# Patient Record
Sex: MALE | Race: OTHER | HISPANIC OR LATINO | Employment: UNEMPLOYED | URBAN - NONMETROPOLITAN AREA
[De-identification: names, ages, dates, MRNs, and addresses within clinical notes are randomized per-mention and may not be internally consistent; named-entity substitution may affect disease eponyms.]

---

## 2023-10-11 ENCOUNTER — HOSPITAL ENCOUNTER (EMERGENCY)
Facility: HOSPITAL | Age: 38
Discharge: HOME | End: 2023-10-11
Attending: EMERGENCY MEDICINE
Payer: MEDICARE

## 2023-10-11 VITALS
HEART RATE: 75 BPM | HEIGHT: 72 IN | WEIGHT: 185 LBS | RESPIRATION RATE: 16 BRPM | DIASTOLIC BLOOD PRESSURE: 83 MMHG | SYSTOLIC BLOOD PRESSURE: 124 MMHG | TEMPERATURE: 97 F | BODY MASS INDEX: 25.06 KG/M2 | OXYGEN SATURATION: 97 %

## 2023-10-11 DIAGNOSIS — K04.7 DENTAL INFECTION: Primary | ICD-10-CM

## 2023-10-11 PROCEDURE — 99283 EMERGENCY DEPT VISIT LOW MDM: CPT | Performed by: EMERGENCY MEDICINE

## 2023-10-11 RX ORDER — IBUPROFEN 600 MG/1
600 TABLET ORAL EVERY 8 HOURS PRN
Qty: 30 TABLET | Refills: 0 | Status: SHIPPED | OUTPATIENT
Start: 2023-10-11 | End: 2023-11-22 | Stop reason: HOSPADM

## 2023-10-11 RX ORDER — AMOXICILLIN 875 MG/1
875 TABLET, FILM COATED ORAL 2 TIMES DAILY
Qty: 20 TABLET | Refills: 0 | Status: SHIPPED | OUTPATIENT
Start: 2023-10-11 | End: 2023-10-21

## 2023-10-11 ASSESSMENT — PAIN SCALES - GENERAL: PAINLEVEL_OUTOF10: 3

## 2023-10-11 ASSESSMENT — PAIN DESCRIPTION - FREQUENCY: FREQUENCY: CONSTANT/CONTINUOUS

## 2023-10-11 ASSESSMENT — COLUMBIA-SUICIDE SEVERITY RATING SCALE - C-SSRS
2. HAVE YOU ACTUALLY HAD ANY THOUGHTS OF KILLING YOURSELF?: NO
6. HAVE YOU EVER DONE ANYTHING, STARTED TO DO ANYTHING, OR PREPARED TO DO ANYTHING TO END YOUR LIFE?: NO
1. IN THE PAST MONTH, HAVE YOU WISHED YOU WERE DEAD OR WISHED YOU COULD GO TO SLEEP AND NOT WAKE UP?: NO

## 2023-10-11 ASSESSMENT — PAIN - FUNCTIONAL ASSESSMENT: PAIN_FUNCTIONAL_ASSESSMENT: 0-10

## 2023-10-11 ASSESSMENT — PAIN DESCRIPTION - LOCATION: LOCATION: FACE

## 2023-10-11 ASSESSMENT — PAIN DESCRIPTION - PAIN TYPE: TYPE: ACUTE PAIN

## 2023-10-11 NOTE — ED TRIAGE NOTES
Pt to ED via POV c/o right sided facial swelling. Pt states he has had a bad tooth on that side and dental pain previously, but only mild pain at this time. Pt states he noticed the increased swelling upon waking this morning. Pt has large swollen area on right cheek/jaw.

## 2023-10-11 NOTE — ED PROVIDER NOTES
Department of Emergency Medicine   ED  Provider Note  Admit Date/RoomTime: 10/11/2023 11:50 AM  ED Room: [facility]/[address]              History of Present Illness:   Alexander Zavala is a 38 y.o. male presenting to the ED for right facial swelling, beginning 2 days ago.  The complaint has been persistent, mild in severity, and worsened by nothing.  Patient reports some dental pain on the upper right side with subsequent swelling starting 2 days ago on the upper right side.  No difficulty swallowing.  No acute voice change.  No fever or chills.  No sore throat.  No neck pain.  No chest pain or shortness of breath or abdominal pain.      Review of Systems:   Pertinent positives and review of systems as noted above.  Remaining 10 review of systems is negative or noncontributory to today's episode of care.        --------------------------------------------- PAST HISTORY ---------------------------------------------  Past Medical History:  has no past medical history on file.  Patient reports psychiatric disorder.  He is on medications for this.    Past Surgical History:  has no past surgical history on file.  No recent past surgical history.    Social History:  reports that he has been smoking cigarettes. He has never used smokeless tobacco.    Family History: family history is not on file. Unless otherwise noted, family history is non contributory    Patient's Medications    No medications on file      The patient’s home medications have been reviewed.    Allergies: Patient has no known allergies.    -------------------------------------------------- RESULTS -------------------------------------------------  All laboratory and radiology results have been personally reviewed by myself   LABS:  Labs Reviewed - No data to display      RADIOLOGY:  Interpreted by Radiologist.  No orders to display       No results found for this or any previous visit (from the past 4464 hour(s)).  -------------------------  NURSING NOTES AND VITALS REVIEWED ---------------------------   The nursing notes within the ED encounter and vital signs as below have been reviewed.   /83   Pulse 75   Temp 36.1 °C (97 °F) (Temporal)   Resp 16   Ht 1.829 m (6')   Wt 83.9 kg (185 lb)   SpO2 97%   BMI 25.09 kg/m²   Oxygen Saturation Interpretation: Normal      ---------------------------------------------------PHYSICAL EXAM--------------------------------------  Physical Exam   Constitutional/General: Alert and oriented x3, well appearing, non toxic in NAD  Head: Normocephalic and atraumatic  Eyes: PERRL, EOMI, conjunctiva normal, sclera non icteric  Mouth: Oropharynx clear, handling secretions, no trismus, no asymmetry of the posterior oropharynx or uvular edema.  No drooling or trismus.  No hot potato voice.  Trachea midline.  No significant cervical adenopathy.  There was some mild tenderness on palpation of the right upper gum.  Neck: Supple, full ROM, non tender to palpation in the midline, no stridor, no crepitus, no meningeal signs  Respiratory: Lungs clear to auscultation bilaterally, no wheezes, rales, or rhonchi. Not in respiratory distress  Cardiovascular:  Regular rate. Regular rhythm. No murmurs, gallops, or rubs. 2+ distal pulses  Chest: No chest wall tenderness  GI:  Abdomen Soft, Non tender, Non distended.  +BS. No organomegaly, no palpable masses,  No rebound, guarding, or rigidity.   Musculoskeletal: Moves all extremities x 4. Warm and well perfused, no clubbing, cyanosis, or edema. Capillary refill <3 seconds  Integument: skin warm and dry. No rashes.   Lymphatic: no lymphadenopathy noted  Neurologic: GCS 15, no focal deficits, symmetric strength 5/5 in the upper and lower extremities bilaterally  Psychiatric: Normal Affect    Procedures  None  ------------------------------ ED COURSE/MEDICAL DECISION MAKING----------------------    Medical Decision Making:   Patient was seen and examined by me.  History and exam is  consistent with dental infection.  Patient's airway is intact.  Patient is nontoxic.  Patient be started on amoxicillin 875 mg twice daily x10 days and ibuprofen and/or Tylenol as needed for pain.  Dental provider list given to patient for follow-up.      ED Course as of 10/11/23 1204   Wed Oct 11, 2023   1200 The patient was seen and examined by me.  Patient appears to have a dental infection on the upper right side.  Patient was started on amoxicillin 875 mg twice daily x10 days and ibuprofen 600 mg 3 times daily as needed  Patient is given referral to dental providers and encouraged to follow-up there. [EC]      ED Course User Index  [EC] Alexey Hsu DO         Diagnoses as of 10/11/23 1204   Dental infection      Counseling:   The emergency provider has spoken with the patient and discussed today’s results, in addition to providing specific details for the plan of care and counseling regarding the diagnosis and prognosis.  Questions are answered at this time and they are agreeable with the plan.      --------------------------------- IMPRESSION AND DISPOSITION ---------------------------------        IMPRESSION  No diagnosis found.    DISPOSITION  Disposition: Discharge to home  Patient condition is good      Billing Provider Critical Care Time: zero  minutes     Alexey Hsu DO  10/11/23 1204

## 2023-11-01 ENCOUNTER — HOSPITAL ENCOUNTER (EMERGENCY)
Facility: HOSPITAL | Age: 38
Discharge: OTHER INSTITUTION | End: 2023-11-02
Attending: EMERGENCY MEDICINE
Payer: MEDICARE

## 2023-11-01 DIAGNOSIS — F25.0 SCHIZOAFFECTIVE DISORDER, BIPOLAR TYPE (MULTI): Primary | ICD-10-CM

## 2023-11-01 PROBLEM — F22 PSYCHOSIS, PARANOID (MULTI): Status: ACTIVE | Noted: 2023-11-01

## 2023-11-01 LAB
ALBUMIN SERPL BCP-MCNC: 4 G/DL (ref 3.4–5)
ALP SERPL-CCNC: 75 U/L (ref 33–120)
ALT SERPL W P-5'-P-CCNC: 15 U/L (ref 10–52)
AMPHETAMINES UR QL SCN: NORMAL
ANION GAP SERPL CALC-SCNC: 11 MMOL/L (ref 10–20)
APPEARANCE UR: CLEAR
AST SERPL W P-5'-P-CCNC: 16 U/L (ref 9–39)
BARBITURATES UR QL SCN: NORMAL
BASOPHILS # BLD AUTO: 0.05 X10*3/UL (ref 0–0.1)
BASOPHILS NFR BLD AUTO: 1 %
BENZODIAZ UR QL SCN: NORMAL
BILIRUB SERPL-MCNC: 0.7 MG/DL (ref 0–1.2)
BILIRUB UR STRIP.AUTO-MCNC: NEGATIVE MG/DL
BUN SERPL-MCNC: 20 MG/DL (ref 6–23)
BZE UR QL SCN: NORMAL
CALCIUM SERPL-MCNC: 9.1 MG/DL (ref 8.6–10.3)
CANNABINOIDS UR QL SCN: NORMAL
CARDIAC TROPONIN I PNL SERPL HS: <3 NG/L (ref 0–20)
CHLORIDE SERPL-SCNC: 104 MMOL/L (ref 98–107)
CO2 SERPL-SCNC: 26 MMOL/L (ref 21–32)
COLOR UR: YELLOW
CREAT SERPL-MCNC: 0.72 MG/DL (ref 0.5–1.3)
EOSINOPHIL # BLD AUTO: 0.21 X10*3/UL (ref 0–0.7)
EOSINOPHIL NFR BLD AUTO: 4.1 %
ERYTHROCYTE [DISTWIDTH] IN BLOOD BY AUTOMATED COUNT: 12.8 % (ref 11.5–14.5)
ETHANOL SERPL-MCNC: <10 MG/DL
FENTANYL+NORFENTANYL UR QL SCN: NORMAL
FLUAV RNA RESP QL NAA+PROBE: NOT DETECTED
FLUBV RNA RESP QL NAA+PROBE: NOT DETECTED
GFR SERPL CREATININE-BSD FRML MDRD: >90 ML/MIN/1.73M*2
GLUCOSE SERPL-MCNC: 87 MG/DL (ref 74–99)
GLUCOSE UR STRIP.AUTO-MCNC: NEGATIVE MG/DL
HCT VFR BLD AUTO: 46.5 % (ref 41–52)
HGB BLD-MCNC: 15.6 G/DL (ref 13.5–17.5)
IMM GRANULOCYTES # BLD AUTO: 0.01 X10*3/UL (ref 0–0.7)
IMM GRANULOCYTES NFR BLD AUTO: 0.2 % (ref 0–0.9)
INR PPP: 1.2 (ref 0.9–1.1)
KETONES UR STRIP.AUTO-MCNC: NEGATIVE MG/DL
LACTATE SERPL-SCNC: 0.7 MMOL/L (ref 0.4–2)
LEUKOCYTE ESTERASE UR QL STRIP.AUTO: NEGATIVE
LYMPHOCYTES # BLD AUTO: 1.27 X10*3/UL (ref 1.2–4.8)
LYMPHOCYTES NFR BLD AUTO: 24.9 %
MCH RBC QN AUTO: 30.2 PG (ref 26–34)
MCHC RBC AUTO-ENTMCNC: 33.5 G/DL (ref 32–36)
MCV RBC AUTO: 90 FL (ref 80–100)
MONOCYTES # BLD AUTO: 0.31 X10*3/UL (ref 0.1–1)
MONOCYTES NFR BLD AUTO: 6.1 %
NEUTROPHILS # BLD AUTO: 3.26 X10*3/UL (ref 1.2–7.7)
NEUTROPHILS NFR BLD AUTO: 63.7 %
NITRITE UR QL STRIP.AUTO: NEGATIVE
NRBC BLD-RTO: 0 /100 WBCS (ref 0–0)
OPIATES UR QL SCN: NORMAL
OXYCODONE+OXYMORPHONE UR QL SCN: NORMAL
PCP UR QL SCN: NORMAL
PH UR STRIP.AUTO: 5 [PH]
PLATELET # BLD AUTO: 236 X10*3/UL (ref 150–450)
PMV BLD AUTO: 10.3 FL (ref 7.5–11.5)
POTASSIUM SERPL-SCNC: 3.8 MMOL/L (ref 3.5–5.3)
PROT SERPL-MCNC: 6.6 G/DL (ref 6.4–8.2)
PROT UR STRIP.AUTO-MCNC: NEGATIVE MG/DL
PROTHROMBIN TIME: 13.3 SECONDS (ref 9.8–12.8)
RBC # BLD AUTO: 5.16 X10*6/UL (ref 4.5–5.9)
RBC # UR STRIP.AUTO: NEGATIVE /UL
SARS-COV-2 RNA RESP QL NAA+PROBE: NOT DETECTED
SODIUM SERPL-SCNC: 137 MMOL/L (ref 136–145)
SP GR UR STRIP.AUTO: 1.02
UROBILINOGEN UR STRIP.AUTO-MCNC: 2 MG/DL
WBC # BLD AUTO: 5.1 X10*3/UL (ref 4.4–11.3)

## 2023-11-01 PROCEDURE — 85025 COMPLETE CBC W/AUTO DIFF WBC: CPT | Performed by: EMERGENCY MEDICINE

## 2023-11-01 PROCEDURE — 99285 EMERGENCY DEPT VISIT HI MDM: CPT | Mod: 25

## 2023-11-01 PROCEDURE — 2500000004 HC RX 250 GENERAL PHARMACY W/ HCPCS (ALT 636 FOR OP/ED): Mod: SE

## 2023-11-01 PROCEDURE — 87636 SARSCOV2 & INF A&B AMP PRB: CPT | Performed by: EMERGENCY MEDICINE

## 2023-11-01 PROCEDURE — 99284 EMERGENCY DEPT VISIT MOD MDM: CPT | Performed by: EMERGENCY MEDICINE

## 2023-11-01 PROCEDURE — 80053 COMPREHEN METABOLIC PANEL: CPT | Performed by: EMERGENCY MEDICINE

## 2023-11-01 PROCEDURE — 96372 THER/PROPH/DIAG INJ SC/IM: CPT

## 2023-11-01 PROCEDURE — 83605 ASSAY OF LACTIC ACID: CPT | Performed by: EMERGENCY MEDICINE

## 2023-11-01 PROCEDURE — 82077 ASSAY SPEC XCP UR&BREATH IA: CPT | Performed by: EMERGENCY MEDICINE

## 2023-11-01 PROCEDURE — 85610 PROTHROMBIN TIME: CPT | Performed by: EMERGENCY MEDICINE

## 2023-11-01 PROCEDURE — 36415 COLL VENOUS BLD VENIPUNCTURE: CPT | Performed by: EMERGENCY MEDICINE

## 2023-11-01 PROCEDURE — 80307 DRUG TEST PRSMV CHEM ANLYZR: CPT | Performed by: EMERGENCY MEDICINE

## 2023-11-01 PROCEDURE — 84484 ASSAY OF TROPONIN QUANT: CPT | Performed by: EMERGENCY MEDICINE

## 2023-11-01 PROCEDURE — 81003 URINALYSIS AUTO W/O SCOPE: CPT | Performed by: EMERGENCY MEDICINE

## 2023-11-01 RX ORDER — ZIPRASIDONE MESYLATE 20 MG/ML
20 INJECTION, POWDER, LYOPHILIZED, FOR SOLUTION INTRAMUSCULAR ONCE
Status: COMPLETED | OUTPATIENT
Start: 2023-11-01 | End: 2023-11-01

## 2023-11-01 RX ORDER — DIPHENHYDRAMINE HYDROCHLORIDE 50 MG/ML
25 INJECTION INTRAMUSCULAR; INTRAVENOUS ONCE
Status: COMPLETED | OUTPATIENT
Start: 2023-11-01 | End: 2023-11-01

## 2023-11-01 RX ORDER — ZIPRASIDONE MESYLATE 20 MG/ML
INJECTION, POWDER, LYOPHILIZED, FOR SOLUTION INTRAMUSCULAR
Status: COMPLETED
Start: 2023-11-01 | End: 2023-11-01

## 2023-11-01 RX ORDER — DIPHENHYDRAMINE HYDROCHLORIDE 50 MG/ML
INJECTION INTRAMUSCULAR; INTRAVENOUS
Status: COMPLETED
Start: 2023-11-01 | End: 2023-11-01

## 2023-11-01 RX ADMIN — ZIPRASIDONE MESYLATE 20 MG: 20 INJECTION, POWDER, LYOPHILIZED, FOR SOLUTION INTRAMUSCULAR at 08:34

## 2023-11-01 RX ADMIN — DIPHENHYDRAMINE HYDROCHLORIDE 25 MG: 50 INJECTION INTRAMUSCULAR; INTRAVENOUS at 08:33

## 2023-11-01 SDOH — HEALTH STABILITY: MENTAL HEALTH: IN THE PAST WEEK, HAVE YOU BEEN HAVING THOUGHTS ABOUT KILLING YOURSELF?: YES

## 2023-11-01 SDOH — ECONOMIC STABILITY: GENERAL: FINANCIAL CONCERNS: OTHER (COMMENT)

## 2023-11-01 SDOH — HEALTH STABILITY: MENTAL HEALTH: NON-SPECIFIC ACTIVE SUICIDAL THOUGHTS (PAST 1 MONTH): YES

## 2023-11-01 SDOH — HEALTH STABILITY: MENTAL HEALTH: ARE YOU HAVING THOUGHTS OF KILLING YOURSELF RIGHT NOW?: YES

## 2023-11-01 SDOH — HEALTH STABILITY: MENTAL HEALTH: WISH TO BE DEAD (PAST 1 MONTH): YES

## 2023-11-01 SDOH — HEALTH STABILITY: MENTAL HEALTH: DESCRIBE YOUR THOUGHTS OF KILLING YOURSELF RIGHT NOW:: PATIENT DECLINED TO PROVIDE DETAILS.

## 2023-11-01 SDOH — HEALTH STABILITY: MENTAL HEALTH: WHEN DID YOU TRY TO KILL YOURSELF?: UNREPORTED

## 2023-11-01 SDOH — HEALTH STABILITY: MENTAL HEALTH: IN THE PAST FEW WEEKS, HAVE YOU FELT THAT YOU OR YOUR FAMILY WOULD BE BETTER OFF IF YOU WERE DEAD?: YES

## 2023-11-01 SDOH — HEALTH STABILITY: MENTAL HEALTH: HOW DID YOU TRY TO KILL YOURSELF?: JUMP OFF BRIDGE, LAYING ON RAILROAD TRACKS, AND PUTTING GUN IN MOUTH.

## 2023-11-01 SDOH — HEALTH STABILITY: MENTAL HEALTH: HAVE YOU EVER TRIED TO KILL YOURSELF?: YES

## 2023-11-01 SDOH — HEALTH STABILITY: MENTAL HEALTH: ACTIVE SUICIDAL IDEATION WITH SPECIFIC PLAN AND INTENT (PAST 1 MONTH): NO

## 2023-11-01 SDOH — HEALTH STABILITY: MENTAL HEALTH: DEPRESSION SYMPTOMS: IMPAIRED CONCENTRATION

## 2023-11-01 SDOH — HEALTH STABILITY: MENTAL HEALTH: ACTIVE SUICIDAL IDEATION WITH SOME INTENT TO ACT, WITHOUT SPECIFIC PLAN (PAST 1 MONTH): NO

## 2023-11-01 SDOH — HEALTH STABILITY: MENTAL HEALTH: SUICIDAL BEHAVIOR (3 MONTHS): NO

## 2023-11-01 SDOH — HEALTH STABILITY: MENTAL HEALTH: SUICIDAL BEHAVIOR (LIFETIME): YES

## 2023-11-01 SDOH — HEALTH STABILITY: MENTAL HEALTH: IN THE PAST FEW WEEKS, HAVE YOU WISHED YOU WERE DEAD?: YES

## 2023-11-01 SDOH — HEALTH STABILITY: MENTAL HEALTH: ANXIETY SYMPTOMS: GENERALIZED

## 2023-11-01 SDOH — HEALTH STABILITY: MENTAL HEALTH
SUICIDAL BEHAVIOR (DESCRIPTION): PATIENT REPORTEDLY HAS HISTORY OF JUMPING OFF BRIDGE, LAYING ON RAILROAD TRACKS, AND PUTTING A GUN INTO PATIENT'S MOUTH. UNCLEAR TIMELINE OF PAST SUICIDE ATTEMPTS.

## 2023-11-01 SDOH — ECONOMIC STABILITY: HOUSING INSECURITY: FEELS SAFE LIVING IN HOME: NO

## 2023-11-01 ASSESSMENT — COLUMBIA-SUICIDE SEVERITY RATING SCALE - C-SSRS
6. HAVE YOU EVER DONE ANYTHING, STARTED TO DO ANYTHING, OR PREPARED TO DO ANYTHING TO END YOUR LIFE?: NO
1. SINCE LAST CONTACT, HAVE YOU WISHED YOU WERE DEAD OR WISHED YOU COULD GO TO SLEEP AND NOT WAKE UP?: NO
1. IN THE PAST MONTH, HAVE YOU WISHED YOU WERE DEAD OR WISHED YOU COULD GO TO SLEEP AND NOT WAKE UP?: NO
6. HAVE YOU EVER DONE ANYTHING, STARTED TO DO ANYTHING, OR PREPARED TO DO ANYTHING TO END YOUR LIFE?: NO
2. HAVE YOU ACTUALLY HAD ANY THOUGHTS OF KILLING YOURSELF?: NO
2. HAVE YOU ACTUALLY HAD ANY THOUGHTS OF KILLING YOURSELF?: NO

## 2023-11-01 ASSESSMENT — PAIN SCALES - GENERAL
PAINLEVEL_OUTOF10: 0 - NO PAIN
PAINLEVEL_OUTOF10: 0 - NO PAIN

## 2023-11-01 ASSESSMENT — PAIN - FUNCTIONAL ASSESSMENT
PAIN_FUNCTIONAL_ASSESSMENT: 0-10
PAIN_FUNCTIONAL_ASSESSMENT: 0-10

## 2023-11-01 ASSESSMENT — PAIN DESCRIPTION - PROGRESSION: CLINICAL_PROGRESSION: NOT CHANGED

## 2023-11-01 NOTE — ED PROVIDER NOTES
Department of Emergency Medicine   ED  Provider Note  Admit Date/RoomTime: No admission date for patient encounter.  ED Room: Room/bed info not found                                                         EMERGENCY DEPARTMENT                                                     [facility]                History of Present Illness:   Alexander Zavala is a 38 y.o. male presenting to the ED for psychiatric evaluation, beginning today.  The patient is a difficult historian and only occasionally answering questions.  It appears he was dropped off by the police for evaluation.  He reports he is hearing voices and people are talking about him.  He is unclear as to his answers questions and repetitive.      Review of Systems:   Pertinent positives and review of systems as noted above.  Remaining 10 review of systems is negative or noncontributory to today's episode of care.  Review of Systems       --------------------------------------------- PAST HISTORY ---------------------------------------------  Past Medical History: Schizoaffective disorder, bipolar disorder, methamphetamine use, generalized anxiety    Past Surgical History:  has no past surgical history on file.    Social History:  reports that he has been smoking cigarettes. He has never used smokeless tobacco.  Patient has history of methamphetamine use    Family History: family history is not on file. Unless otherwise noted, family history is non contributory    Patient's Medications   New Prescriptions    No medications on file   Previous Medications    IBUPROFEN 600 MG TABLET    Take 1 tablet (600 mg) by mouth every 8 hours if needed for mild pain (1 - 3) or fever (temp greater than 38.0 C).   Modified Medications    No medications on file   Discontinued Medications    No medications on file      The patient’s home medications have been reviewed.    Allergies: Patient has no known allergies.    --------------------------------------------------  RESULTS -------------------------------------------------  All laboratory and radiology results have been personally reviewed by myself   LABS:  Labs Reviewed   PROTIME-INR - Abnormal       Result Value    Protime 13.3 (*)     INR 1.2 (*)    ALCOHOL - Normal    Alcohol <10     LACTATE - Normal    Lactate 0.7      Narrative:     Venipuncture immediately after or during the administration of Metamizole may lead to falsely low results. Testing should be performed immediately  prior to Metamizole dosing.   COMPREHENSIVE METABOLIC PANEL - Normal    Glucose 87      Sodium 137      Potassium 3.8      Chloride 104      Bicarbonate 26      Anion Gap 11      Urea Nitrogen 20      Creatinine 0.72      eGFR >90      Calcium 9.1      Albumin 4.0      Alkaline Phosphatase 75      Total Protein 6.6      AST 16      Bilirubin, Total 0.7      ALT 15     SARS-COV-2 AND INFLUENZA A/B PCR - Normal    Flu A Result Not Detected      Flu B Result Not Detected      Coronavirus 2019, PCR Not Detected      Narrative:     This assay has received FDA Emergency Use Authorization (EUA) and  is only authorized for the duration of time that circumstances exist to justify the authorization of the emergency use of in vitro diagnostic tests for the detection of SARS-CoV-2 virus and/or diagnosis of COVID-19 infection under section 564(b)(1) of the Act, 21 U.S.C. 360bbb-3(b)(1). Testing for SARS-CoV-2 is only recommended for patients who meet current clinical and/or epidemiological criteria as defined by federal, state, or local public health directives. This assay is an in vitro diagnostic nucleic acid amplification test for the qualitative detection of SARS-CoV-2, Influenza A, and Influenza B from nasopharyngeal specimens and has been validated for use at [facility]. Negative results do not preclude COVID-19 infections or Influenza A/B infections, and should not be used as the sole basis for diagnosis, treatment, or other  management decisions. If Influenza A/B and RSV PCR results are negative, testing for Parainfluenza virus, Adenovirus and Metapneumovirus is routinely performed for [facility] pediatric oncology and intensive care inpatients, and is available on other patients by placing an add-on request.    TROPONIN I, HIGH SENSITIVITY - Normal    Troponin I, High Sensitivity <3      Narrative:     Less than 99th percentile of normal range cutoff-  Female and children under 18 years old <14 ng/L; Male <21 ng/L: Negative  Repeat testing should be performed if clinically indicated.     Female and children under 18 years old 14-50 ng/L; Male 21-50 ng/L:  Consistent with possible cardiac damage and possible increased clinical   risk. Serial measurements may help to assess extent of myocardial damage.     >50 ng/L: Consistent with cardiac damage, increased clinical risk and  myocardial infarction. Serial measurements may help assess extent of   myocardial damage.      NOTE: Children less than 1 year old may have higher baseline troponin   levels and results should be interpreted in conjunction with the overall   clinical context.     NOTE: Troponin I testing is performed using a different   testing methodology at [facility] than at other   [facility]. Direct result comparisons should only   be made within the same method.   CBC WITH AUTO DIFFERENTIAL    WBC 5.1      nRBC 0.0      RBC 5.16      Hemoglobin 15.6      Hematocrit 46.5      MCV 90      MCH 30.2      MCHC 33.5      RDW 12.8      Platelets 236      MPV 10.3      Neutrophils % 63.7      Immature Granulocytes %, Automated 0.2      Lymphocytes % 24.9      Monocytes % 6.1      Eosinophils % 4.1      Basophils % 1.0      Neutrophils Absolute 3.26      Immature Granulocytes Absolute, Automated 0.01      Lymphocytes Absolute 1.27      Monocytes Absolute 0.31      Eosinophils Absolute 0.21      Basophils Absolute 0.05     DRUG SCREEN,URINE   URINALYSIS WITH REFLEX  MICROSCOPIC     EKG reveals a sinus rhythm at 60 bpm, there is a minor sinus arrhythmia, no acute ST elevations.  Interpreted by NICO Harris MD    RADIOLOGY:  Interpreted by Radiologist.  No orders to display       No results found for this or any previous visit (from the past 4464 hour(s)).  ------------------------- NURSING NOTES AND VITALS REVIEWED ---------------------------   The nursing notes within the ED encounter and vital signs as below have been reviewed.   There were no vitals taken for this visit.  Oxygen Saturation Interpretation: Normal      ---------------------------------------------------PHYSICAL EXAM--------------------------------------  Physical Exam       Vitals: Reviewed    Constitutional: Alert patient in no acute distress at this time, confused and withdrawn  HEENT; Atraumatic, no ocular injection, normal hearing for patient, no rhinitis, no pharyngeal exudate or mass.  Pulmonary: Lung sounds are clear and equal, no cyanosis  Cardiovascular: heart tones are normal regular, normal capillary refill  Abdomen is soft positive bowel sounds nontender, without guarding or rebound  Integumentary: Skin without rash or jaundice  Neurological:  No focal deficit  Endocrine:  No thyromegaly or tremor  Lymphatic: No abnormal adenopathy  Musculoskeletal:  atraumatic  Psychiatric: Patient is confused and withdrawn.  He is unclear his answer to questions.  He states he is hearing noises and seeing things.  He was dropped off by the police for psychiatric evaluation.    Procedures    ------------------------------ ED COURSE/MEDICAL DECISION MAKING----------------------    Medical Decision Making:   This patient presents to the emergency department with the above history and physical.  Patient has no evidence of acute life-threatening medical illness or injury that prohibit appropriate psychiatric evaluation.  Patient is medically clear for psychiatric evaluation and treatment as of 1123 and EPAT consultation  order aye Harris M.D.    5:22 PM  Patient has been evaluated by the EPAT team.  They feel the patient will benefit from inpatient psychiatric care.  Remains withdrawn and does not answer questions well.  [facility]    Diagnoses as of 11/01/23 1610   Schizoaffective disorder, bipolar type (CMS/HCC)      Counseling:   The emergency provider has spoken with the patient and discussed today’s results, in addition to providing specific details for the plan of care and counseling regarding the diagnosis and prognosis.  Questions are answered at this time and they are agreeable with the plan.      --------------------------------- IMPRESSION AND DISPOSITION ---------------------------------        IMPRESSION  1. Schizoaffective disorder, bipolar type (CMS/HCC)        DISPOSITION  Disposition: [facility]  Patient condition is poor      Billing Provider Critical Care Time: 0 minutes     Filiberto Harris MD  11/01/23 2000       Filiberto Harris MD  11/01/23 2007
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
## 2023-11-02 ENCOUNTER — HOSPITAL ENCOUNTER (INPATIENT)
Facility: HOSPITAL | Age: 38
LOS: 20 days | Discharge: HOME | DRG: 885 | End: 2023-11-22
Attending: PSYCHIATRY & NEUROLOGY | Admitting: PSYCHIATRY & NEUROLOGY
Payer: MEDICARE

## 2023-11-02 VITALS
HEART RATE: 54 BPM | BODY MASS INDEX: 23.77 KG/M2 | HEIGHT: 71 IN | WEIGHT: 169.75 LBS | TEMPERATURE: 97.5 F | SYSTOLIC BLOOD PRESSURE: 92 MMHG | OXYGEN SATURATION: 100 % | RESPIRATION RATE: 16 BRPM | DIASTOLIC BLOOD PRESSURE: 60 MMHG

## 2023-11-02 DIAGNOSIS — F43.10 PTSD (POST-TRAUMATIC STRESS DISORDER): ICD-10-CM

## 2023-11-02 DIAGNOSIS — F25.0 SCHIZOAFFECTIVE DISORDER, BIPOLAR TYPE (MULTI): ICD-10-CM

## 2023-11-02 DIAGNOSIS — G25.71 AKATHISIA: ICD-10-CM

## 2023-11-02 DIAGNOSIS — F17.210 CIGARETTE NICOTINE DEPENDENCE WITHOUT COMPLICATION: ICD-10-CM

## 2023-11-02 DIAGNOSIS — G47.00 INSOMNIA, UNSPECIFIED TYPE: ICD-10-CM

## 2023-11-02 DIAGNOSIS — F22 PSYCHOSIS, PARANOID (MULTI): Primary | ICD-10-CM

## 2023-11-02 DIAGNOSIS — F41.9 ANXIETY: ICD-10-CM

## 2023-11-02 DIAGNOSIS — E55.9 VITAMIN D DEFICIENCY: ICD-10-CM

## 2023-11-02 LAB
25(OH)D3 SERPL-MCNC: 23 NG/ML (ref 30–100)
EST. AVERAGE GLUCOSE BLD GHB EST-MCNC: 100 MG/DL
HBA1C MFR BLD: 5.1 %
HIV 1+2 AB+HIV1 P24 AG SERPL QL IA: NONREACTIVE
TSH SERPL-ACNC: 0.18 MIU/L (ref 0.44–3.98)

## 2023-11-02 PROCEDURE — 99223 1ST HOSP IP/OBS HIGH 75: CPT | Performed by: NURSE PRACTITIONER

## 2023-11-02 PROCEDURE — 80061 LIPID PANEL: CPT | Mod: GEALAB | Performed by: NURSE PRACTITIONER

## 2023-11-02 PROCEDURE — 2500000004 HC RX 250 GENERAL PHARMACY W/ HCPCS (ALT 636 FOR OP/ED): Performed by: NURSE PRACTITIONER

## 2023-11-02 PROCEDURE — 2500000001 HC RX 250 WO HCPCS SELF ADMINISTERED DRUGS (ALT 637 FOR MEDICARE OP): Performed by: PSYCHIATRY & NEUROLOGY

## 2023-11-02 PROCEDURE — 84436 ASSAY OF TOTAL THYROXINE: CPT | Mod: GEALAB | Performed by: NURSE PRACTITIONER

## 2023-11-02 PROCEDURE — 1240000001 HC SEMI-PRIVATE BH ROOM DAILY

## 2023-11-02 PROCEDURE — 82306 VITAMIN D 25 HYDROXY: CPT | Mod: GEALAB | Performed by: PSYCHIATRY & NEUROLOGY

## 2023-11-02 PROCEDURE — 84443 ASSAY THYROID STIM HORMONE: CPT | Mod: GEALAB | Performed by: NURSE PRACTITIONER

## 2023-11-02 PROCEDURE — 84439 ASSAY OF FREE THYROXINE: CPT | Mod: GEALAB | Performed by: NURSE PRACTITIONER

## 2023-11-02 PROCEDURE — 2500000001 HC RX 250 WO HCPCS SELF ADMINISTERED DRUGS (ALT 637 FOR MEDICARE OP): Performed by: NURSE PRACTITIONER

## 2023-11-02 PROCEDURE — 36415 COLL VENOUS BLD VENIPUNCTURE: CPT | Performed by: PSYCHIATRY & NEUROLOGY

## 2023-11-02 PROCEDURE — 36415 COLL VENOUS BLD VENIPUNCTURE: CPT | Mod: GEALAB | Performed by: PSYCHIATRY & NEUROLOGY

## 2023-11-02 PROCEDURE — 87389 HIV-1 AG W/HIV-1&-2 AB AG IA: CPT | Mod: GEALAB | Performed by: NURSE PRACTITIONER

## 2023-11-02 PROCEDURE — 83718 ASSAY OF LIPOPROTEIN: CPT | Mod: GEALAB | Performed by: NURSE PRACTITIONER

## 2023-11-02 PROCEDURE — 83036 HEMOGLOBIN GLYCOSYLATED A1C: CPT | Mod: GEALAB | Performed by: PSYCHIATRY & NEUROLOGY

## 2023-11-02 RX ORDER — OLANZAPINE 10 MG/1
10 TABLET ORAL NIGHTLY
Status: DISCONTINUED | OUTPATIENT
Start: 2023-11-02 | End: 2023-11-03

## 2023-11-02 RX ORDER — TALC
3 POWDER (GRAM) TOPICAL
Status: DISCONTINUED | OUTPATIENT
Start: 2023-11-02 | End: 2023-11-02

## 2023-11-02 RX ORDER — LORAZEPAM 1 MG/1
2 TABLET ORAL EVERY 6 HOURS PRN
Status: DISCONTINUED | OUTPATIENT
Start: 2023-11-02 | End: 2023-11-13

## 2023-11-02 RX ORDER — OLANZAPINE 15 MG/1
15 TABLET ORAL NIGHTLY
COMMUNITY
End: 2023-11-22 | Stop reason: HOSPADM

## 2023-11-02 RX ORDER — LORAZEPAM 1 MG/1
1 TABLET ORAL 3 TIMES DAILY
Status: DISCONTINUED | OUTPATIENT
Start: 2023-11-02 | End: 2023-11-08

## 2023-11-02 RX ORDER — HALOPERIDOL 5 MG/1
5 TABLET ORAL EVERY 6 HOURS PRN
Status: DISCONTINUED | OUTPATIENT
Start: 2023-11-02 | End: 2023-11-22 | Stop reason: HOSPADM

## 2023-11-02 RX ORDER — DIPHENHYDRAMINE HCL 50 MG
50 CAPSULE ORAL EVERY 6 HOURS PRN
Status: DISCONTINUED | OUTPATIENT
Start: 2023-11-02 | End: 2023-11-22 | Stop reason: HOSPADM

## 2023-11-02 RX ORDER — HYDROXYZINE PAMOATE 50 MG/1
50 CAPSULE ORAL EVERY 4 HOURS PRN
Status: DISCONTINUED | OUTPATIENT
Start: 2023-11-02 | End: 2023-11-22 | Stop reason: HOSPADM

## 2023-11-02 RX ORDER — HALOPERIDOL 5 MG/ML
5 INJECTION INTRAMUSCULAR EVERY 6 HOURS PRN
Status: DISCONTINUED | OUTPATIENT
Start: 2023-11-02 | End: 2023-11-22 | Stop reason: HOSPADM

## 2023-11-02 RX ORDER — OLANZAPINE 2.5 MG/1
TABLET ORAL NIGHTLY
COMMUNITY
End: 2023-11-22 | Stop reason: HOSPADM

## 2023-11-02 RX ORDER — ACETAMINOPHEN 500 MG
10 TABLET ORAL NIGHTLY
Status: DISCONTINUED | OUTPATIENT
Start: 2023-11-02 | End: 2023-11-22 | Stop reason: HOSPADM

## 2023-11-02 RX ORDER — LORAZEPAM 2 MG/ML
2 INJECTION INTRAMUSCULAR EVERY 6 HOURS PRN
Status: DISCONTINUED | OUTPATIENT
Start: 2023-11-02 | End: 2023-11-13

## 2023-11-02 RX ORDER — ALUMINUM HYDROXIDE, MAGNESIUM HYDROXIDE, AND SIMETHICONE 1200; 120; 1200 MG/30ML; MG/30ML; MG/30ML
30 SUSPENSION ORAL EVERY 6 HOURS PRN
Status: DISCONTINUED | OUTPATIENT
Start: 2023-11-02 | End: 2023-11-22 | Stop reason: HOSPADM

## 2023-11-02 RX ORDER — DIPHENHYDRAMINE HYDROCHLORIDE 50 MG/ML
50 INJECTION INTRAMUSCULAR; INTRAVENOUS ONCE AS NEEDED
Status: DISCONTINUED | OUTPATIENT
Start: 2023-11-02 | End: 2023-11-22 | Stop reason: HOSPADM

## 2023-11-02 RX ORDER — ACETAMINOPHEN 325 MG/1
650 TABLET ORAL EVERY 4 HOURS PRN
Status: DISCONTINUED | OUTPATIENT
Start: 2023-11-02 | End: 2023-11-22 | Stop reason: HOSPADM

## 2023-11-02 RX ORDER — MICONAZOLE NITRATE 2 %
2 CREAM (GRAM) TOPICAL EVERY 2 HOUR PRN
Status: DISCONTINUED | OUTPATIENT
Start: 2023-11-02 | End: 2023-11-12

## 2023-11-02 RX ADMIN — DIPHENHYDRAMINE HYDROCHLORIDE 50 MG: 50 CAPSULE ORAL at 02:07

## 2023-11-02 RX ADMIN — OLANZAPINE 10 MG: 10 TABLET, FILM COATED ORAL at 21:09

## 2023-11-02 RX ADMIN — LORAZEPAM 1 MG: 1 TABLET ORAL at 16:30

## 2023-11-02 RX ADMIN — Medication 10 MG: at 21:09

## 2023-11-02 RX ADMIN — LORAZEPAM 2 MG: 1 TABLET ORAL at 02:07

## 2023-11-02 RX ADMIN — LORAZEPAM 1 MG: 1 TABLET ORAL at 21:09

## 2023-11-02 RX ADMIN — HALOPERIDOL 5 MG: 5 TABLET ORAL at 02:07

## 2023-11-02 SDOH — HEALTH STABILITY: MENTAL HEALTH: HOW OFTEN DO YOU HAVE A DRINK CONTAINING ALCOHOL?: MONTHLY OR LESS

## 2023-11-02 SDOH — SOCIAL STABILITY: SOCIAL NETWORK: HOW OFTEN DO YOU ATTENT MEETINGS OF THE CLUB OR ORGANIZATION YOU BELONG TO?: NEVER

## 2023-11-02 SDOH — ECONOMIC STABILITY: FOOD INSECURITY: WITHIN THE PAST 12 MONTHS, THE FOOD YOU BOUGHT JUST DIDN'T LAST AND YOU DIDN'T HAVE MONEY TO GET MORE.: PATIENT DECLINED

## 2023-11-02 SDOH — HEALTH STABILITY: MENTAL HEALTH: HOW OFTEN DO YOU HAVE 6 OR MORE DRINKS ON ONE OCCASION?: NEVER

## 2023-11-02 SDOH — ECONOMIC STABILITY: HOUSING INSECURITY
IN THE LAST 12 MONTHS, WAS THERE A TIME WHEN YOU DID NOT HAVE A STEADY PLACE TO SLEEP OR SLEPT IN A SHELTER (INCLUDING NOW)?: YES

## 2023-11-02 SDOH — ECONOMIC STABILITY: INCOME INSECURITY: IN THE LAST 12 MONTHS, WAS THERE A TIME WHEN YOU WERE NOT ABLE TO PAY THE MORTGAGE OR RENT ON TIME?: NO

## 2023-11-02 SDOH — ECONOMIC STABILITY: INCOME INSECURITY: HOW HARD IS IT FOR YOU TO PAY FOR THE VERY BASICS LIKE FOOD, HOUSING, MEDICAL CARE, AND HEATING?: NOT VERY HARD

## 2023-11-02 SDOH — HEALTH STABILITY: MENTAL HEALTH

## 2023-11-02 SDOH — HEALTH STABILITY: MENTAL HEALTH: HAVE YOU USED ANY SUBSTANCES (CANABIS, COCAINE, HEROIN, HALLUCINOGENS, INHALANTS, ETC.) IN THE PAST 12 MONTHS?: YES

## 2023-11-02 SDOH — ECONOMIC STABILITY: HOUSING INSECURITY: IN THE LAST 12 MONTHS, HOW MANY PLACES HAVE YOU LIVED?: 2

## 2023-11-02 SDOH — SOCIAL STABILITY: SOCIAL NETWORK
DO YOU BELONG TO ANY CLUBS OR ORGANIZATIONS SUCH AS CHURCH GROUPS UNIONS, FRATERNAL OR ATHLETIC GROUPS, OR SCHOOL GROUPS?: NO

## 2023-11-02 SDOH — HEALTH STABILITY: PHYSICAL HEALTH: ON AVERAGE, HOW MANY DAYS PER WEEK DO YOU ENGAGE IN MODERATE TO STRENUOUS EXERCISE (LIKE A BRISK WALK)?: 0 DAYS

## 2023-11-02 SDOH — HEALTH STABILITY: MENTAL HEALTH: HOW MANY STANDARD DRINKS CONTAINING ALCOHOL DO YOU HAVE ON A TYPICAL DAY?: PATIENT DOES NOT DRINK

## 2023-11-02 SDOH — SOCIAL STABILITY: SOCIAL NETWORK: IN A TYPICAL WEEK, HOW MANY TIMES DO YOU TALK ON THE PHONE WITH FAMILY, FRIENDS, OR NEIGHBORS?: PATIENT DECLINED

## 2023-11-02 SDOH — SOCIAL STABILITY: SOCIAL NETWORK: HOW OFTEN DO YOU ATTEND CHURCH OR RELIGIOUS SERVICES?: PATIENT DECLINED

## 2023-11-02 SDOH — HEALTH STABILITY: PHYSICAL HEALTH: ON AVERAGE, HOW MANY MINUTES DO YOU ENGAGE IN EXERCISE AT THIS LEVEL?: 0 MIN

## 2023-11-02 SDOH — HEALTH STABILITY: MENTAL HEALTH: HAVE YOU USED ANY PRESCRIPTION DRUGS OTHER THAN PRESCRIBED IN THE PAST 12 MONTHS?: NO

## 2023-11-02 SDOH — ECONOMIC STABILITY: FOOD INSECURITY: WITHIN THE PAST 12 MONTHS, YOU WORRIED THAT YOUR FOOD WOULD RUN OUT BEFORE YOU GOT MONEY TO BUY MORE.: PATIENT DECLINED

## 2023-11-02 SDOH — HEALTH STABILITY: MENTAL HEALTH
STRESS IS WHEN SOMEONE FEELS TENSE, NERVOUS, ANXIOUS, OR CAN'T SLEEP AT NIGHT BECAUSE THEIR MIND IS TROUBLED. HOW STRESSED ARE YOU?: VERY MUCH

## 2023-11-02 SDOH — SOCIAL STABILITY: SOCIAL NETWORK: ARE YOU MARRIED, WIDOWED, DIVORCED, SEPARATED, NEVER MARRIED, OR LIVING WITH A PARTNER?: PATIENT DECLINED

## 2023-11-02 SDOH — ECONOMIC STABILITY: TRANSPORTATION INSECURITY
IN THE PAST 12 MONTHS, HAS LACK OF TRANSPORTATION KEPT YOU FROM MEETINGS, WORK, OR FROM GETTING THINGS NEEDED FOR DAILY LIVING?: YES

## 2023-11-02 SDOH — ECONOMIC STABILITY: TRANSPORTATION INSECURITY
IN THE PAST 12 MONTHS, HAS THE LACK OF TRANSPORTATION KEPT YOU FROM MEDICAL APPOINTMENTS OR FROM GETTING MEDICATIONS?: YES

## 2023-11-02 SDOH — SOCIAL STABILITY: SOCIAL NETWORK: HOW OFTEN DO YOU GET TOGETHER WITH FRIENDS OR RELATIVES?: PATIENT DECLINED

## 2023-11-02 ASSESSMENT — COLUMBIA-SUICIDE SEVERITY RATING SCALE - C-SSRS
1. SINCE LAST CONTACT, HAVE YOU WISHED YOU WERE DEAD OR WISHED YOU COULD GO TO SLEEP AND NOT WAKE UP?: YES
6. HAVE YOU EVER DONE ANYTHING, STARTED TO DO ANYTHING, OR PREPARED TO DO ANYTHING TO END YOUR LIFE?: NO
5. HAVE YOU STARTED TO WORK OUT OR WORKED OUT THE DETAILS OF HOW TO KILL YOURSELF? DO YOU INTEND TO CARRY OUT THIS PLAN?: NO
5. HAVE YOU STARTED TO WORK OUT OR WORKED OUT THE DETAILS OF HOW TO KILL YOURSELF? DO YOU INTEND TO CARRY OUT THIS PLAN?: NO
4. HAVE YOU HAD THESE THOUGHTS AND HAD SOME INTENTION OF ACTING ON THEM?: NO
6. HAVE YOU EVER DONE ANYTHING, STARTED TO DO ANYTHING, OR PREPARED TO DO ANYTHING TO END YOUR LIFE?: NO
2. HAVE YOU ACTUALLY HAD ANY THOUGHTS OF KILLING YOURSELF?: NO
2. HAVE YOU ACTUALLY HAD ANY THOUGHTS OF KILLING YOURSELF?: YES
2. HAVE YOU ACTUALLY HAD ANY THOUGHTS OF KILLING YOURSELF?: YES
5. HAVE YOU STARTED TO WORK OUT OR WORKED OUT THE DETAILS OF HOW TO KILL YOURSELF? DO YOU INTEND TO CARRY OUT THIS PLAN?: NO
6. HAVE YOU EVER DONE ANYTHING, STARTED TO DO ANYTHING, OR PREPARED TO DO ANYTHING TO END YOUR LIFE?: NO
6. HAVE YOU EVER DONE ANYTHING, STARTED TO DO ANYTHING, OR PREPARED TO DO ANYTHING TO END YOUR LIFE?: NO
1. SINCE LAST CONTACT, HAVE YOU WISHED YOU WERE DEAD OR WISHED YOU COULD GO TO SLEEP AND NOT WAKE UP?: NO
1. IN THE PAST MONTH, HAVE YOU WISHED YOU WERE DEAD OR WISHED YOU COULD GO TO SLEEP AND NOT WAKE UP?: YES

## 2023-11-02 ASSESSMENT — ACTIVITIES OF DAILY LIVING (ADL)
HEARING - LEFT EAR: FUNCTIONAL
ADEQUATE_TO_COMPLETE_ADL: YES
BATHING: INDEPENDENT
WALKS IN HOME: INDEPENDENT
LACK_OF_TRANSPORTATION: YES
HEARING - RIGHT EAR: FUNCTIONAL
FEEDING YOURSELF: INDEPENDENT
JUDGMENT_ADEQUATE_SAFELY_COMPLETE_DAILY_ACTIVITIES: NO
TOILETING: INDEPENDENT
DRESSING YOURSELF: INDEPENDENT
PATIENT'S MEMORY ADEQUATE TO SAFELY COMPLETE DAILY ACTIVITIES?: UNABLE TO ASSESS
GROOMING: INDEPENDENT

## 2023-11-02 ASSESSMENT — PAIN SCALES - GENERAL
PAINLEVEL_OUTOF10: 0 - NO PAIN

## 2023-11-02 ASSESSMENT — LIFESTYLE VARIABLES
SKIP TO QUESTIONS 9-10: 1
AUDIT-C TOTAL SCORE: 1

## 2023-11-02 ASSESSMENT — PAIN - FUNCTIONAL ASSESSMENT
PAIN_FUNCTIONAL_ASSESSMENT: 0-10

## 2023-11-02 ASSESSMENT — ENCOUNTER SYMPTOMS
HALLUCINATIONS: 1
AGITATION: 1
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
## 2023-11-02 NOTE — NURSING NOTE
Patient admitted from [facility] with psychosis.  Patient states he is having a difficult time getting his words out correctly and exhibits a delay in his responses.  He states his anxiety and depression are both 10/10.  States he is at times suicidal due to the voices that he is always hearing.  States he can CFS while on the unit.  Denies any alcohol use but uses Meth regularly.  Patient is cooperative but it is difficult for him to remain on task.  Due to the severity of the voices, he was given an oral B52 which was effective.  No further PRN medications were administered at this time.  Will continue to monitor.

## 2023-11-02 NOTE — CARE PLAN
(Provisional history as patient was somnolent and unable to be engaged to complete the assessment): This is a 38 year old single white male, who was admitted for auditory hallucinations and suicidal statements; he has a history of anxiety, schizoaffective disorder (bipolar type) and stimulant use disorder (methamphetamine);  He has been inpatient psychiatrically 15 times since 2017, most recently at [facility] for 4-5 months ([facility]);  he has attempted suicide 3 x's (by jumping from a bridge, laying on train tracks, and threatening self with a gun);  He has immediate family support from his biological brother, with whom he was living prior to admit;  he has community support from his outpatient community mental health provider ([facility]);  Plan A: to stabilize, recommend resuming follow up outpatient care at current community mental health provider, [facility] and recommend either: inpatient chemical dependency  treatment or dual IOP, whichever is more appropriate, depending on this current progress of treatment;  Plan B: to stabilize, and add to Plan A, possible group home placement, if outpatient [facility] mental health can assist with funding and group home facility.  Social Work to follow.

## 2023-11-02 NOTE — PROGRESS NOTES
CTRS attempted to complete recreation therapy assessment.  Patient was laying in bed with head over covers and did not respond to verbal cues or prompts when attempted to wake.  Patient sleeping soundly.  CTRS will re-attempt at later time.

## 2023-11-02 NOTE — CARE PLAN
"Patient sleeping in bed most of the day, when awake pt is withdrawn and seclusive to self on the unit.  Pt is quiet and slow to respond with minimal eye contact. Rated anxiety 8/10 and depression \"pretty high, I think I'm going to die soon\". Endorses auditory hallucinations of voices saying pt is \"worthless\". Pt expressed feeling like body is \"melting\" when laying in bed. No complaints of pain or discomfort. Medication compliant. Pt unable to state coping skills, strengths and a goal. Q15 minute checks to be maintained throughout shift for safety.   "

## 2023-11-02 NOTE — CARE PLAN
The patient's goals for the shift include None    The clinical goals for the shift include Get sleep and reduce hallucinations    Patient was able to sleep well after receiving an oral B52.  No further PRN medications were administered.  No changes from earlier assessment.

## 2023-11-02 NOTE — CARE PLAN
Attempted to see patient this afternoon to do medicine consult. Was told by staff that he is still sleeping and to wait till more awake and alert.
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
## 2023-11-02 NOTE — SIGNIFICANT EVENT
Application for Emergency Admission      Ready for Transfer?  Is the patient medically cleared for transfer to inpatient psychiatry: Yes  Has the patient been accepted to an inpatient psychiatric hospital: Yes    Application for Emergency Admission  IN ACCORDANCE WITH [place]  The Chief Clinical Officer of: ETHEL Valladares 11/1/2023 .8:23 PM    Reason for Hospitalization  The undersigned has reason to believe that: Alexander Zavala Is a mentally ill person subject to hospitalization by court order under division B Section 5122.01 of the Revised Code, i.e., this person:    1.No  Represents a substantial risk of physical harm to self as manifested by evidence of threats of, or attempts at, suicide or serious self-inflicted bodily harm    2.No Represents a substantial risk of physical harm to others as manifested by evidence of recent homicidal or other violent behavior, evidence of recent threats that place another in reasonable fear of violent behavior and serious physical harm, or other evidence of present dangerousness    3.Yes Represents a substantial and immediate risk of serious physical impairment or injury to self as manifested by  evidence that the person is unable to provide for and is not providing for the person's basic physical needs because of the person's mental illness and that appropriate provision for those needs cannot be made  immediately available in the community    4.Yes Would benefit from treatment in a hospital for his mental illness and is in need of such treatment as manifested by evidence of behavior that creates a grave and imminent risk to substantial rights of others or  himself.    5.Yes Would benefit from treatment as manifested by evidence of behavior that indicates all of the following:       (a) The person is unlikely to survive safely in the community without supervision, based on a clinical determination.       (b) The person has a history of lack of compliance with  treatment for mental illness and one of the following applies:      (i) At least twice within the thirty-six months prior to the filing of an affidavit seeking court-ordered treatment of the person under section 5122.111 of the Revised Code, the lack of compliance has been a significant factor in necessitating hospitalization in a hospital or receipt of services in a forensic or other mental health unit of a correctional facility, provided that the thirty-six-month period shall be extended by the length of any hospitalization or incarceration of the person that occurred within the thirty-six-month period.      (ii) Within the forty-eight months prior to the filing of an affidavit seeking court-ordered treatment of the person under section 5122.111 of the Revised Code, the lack of compliance resulted in one or more acts of serious violent behavior toward self or others or threats of, or attempts at, serious physical harm to self or others, provided that the forty-eight-month period shall be extended by the length of any hospitalization or incarceration of the person that occurred within the forty-eight-month period.      (c) The person, as a result of mental illness, is unlikely to voluntarily participate in necessary treatment.       (d) In view of the person's treatment history and current behavior, the person is in need of treatment in order to prevent a relapse or deterioration that would be likely to result in substantial risk of serious harm to the person or others.    (e) Represents a substantial risk of physical harm to self or others if allowed to remain at liberty pending examination.    Therefore, it is requested that said person be admitted to the above named facility.    STATEMENT OF BELIEF    Must be filled out by one of the following: a psychiatrist, licensed physician, licensed clinical psychologist, health or ,  or .  (Statement shall include the circumstances under  which the individual was taken into custody and the reason for the person's belief that hospitalization is necessary. The statement shall also include a reference to efforts made to secure the individual's property at his residence if he was taken into custody there. Every reasonable and appropriate effort should be made to take this person into custody in the least conspicuous manner possible.)    Patient is withdrawn and hallucinating.  He is unable to answer questions or communicate effectively.  He was dropped off by the police for wandering about the city.    Filiberto Harris MD 11/1/2023     _____________________________________________________________   Place of Employment: [employer] [facility] [employer]    STATEMENT OF OBSERVATION BY PSYCHIATRIST, LICENSED PHYSICIAN, OR LICENSED CLINICAL PSYCHOLOGIST, IF APPLICABLE    Place of Observation (e.g., [facility] center, general hospital, office, emergency facility)  (If applicable, please complete)    Filiberto Harris MD 11/1/2023    _____________________________________________________________

## 2023-11-03 LAB
CHOLEST SERPL-MCNC: 229 MG/DL (ref 0–199)
CHOLESTEROL/HDL RATIO: 5.7
HDLC SERPL-MCNC: 40.3 MG/DL
LDLC SERPL CALC-MCNC: 164 MG/DL
NON HDL CHOLESTEROL: 189 MG/DL (ref 0–149)
T4 FREE SERPL-MCNC: 0.98 NG/DL (ref 0.78–1.48)
T4 SERPL-MCNC: 7.6 UG/DL (ref 4.5–11.1)
TRIGL SERPL-MCNC: 122 MG/DL (ref 0–149)
VLDL: 24 MG/DL (ref 0–40)

## 2023-11-03 PROCEDURE — 1240000001 HC SEMI-PRIVATE BH ROOM DAILY

## 2023-11-03 PROCEDURE — 99233 SBSQ HOSP IP/OBS HIGH 50: CPT | Performed by: NURSE PRACTITIONER

## 2023-11-03 PROCEDURE — 99221 1ST HOSP IP/OBS SF/LOW 40: CPT | Performed by: NURSE PRACTITIONER

## 2023-11-03 PROCEDURE — 2500000001 HC RX 250 WO HCPCS SELF ADMINISTERED DRUGS (ALT 637 FOR MEDICARE OP): Performed by: NURSE PRACTITIONER

## 2023-11-03 PROCEDURE — 2500000004 HC RX 250 GENERAL PHARMACY W/ HCPCS (ALT 636 FOR OP/ED): Performed by: NURSE PRACTITIONER

## 2023-11-03 RX ORDER — ERGOCALCIFEROL 1.25 MG/1
1250 CAPSULE ORAL
Status: DISCONTINUED | OUTPATIENT
Start: 2023-11-03 | End: 2023-11-22 | Stop reason: HOSPADM

## 2023-11-03 RX ORDER — OLANZAPINE 10 MG/1
20 TABLET ORAL NIGHTLY
Status: DISCONTINUED | OUTPATIENT
Start: 2023-11-04 | End: 2023-11-06

## 2023-11-03 RX ORDER — CHOLECALCIFEROL (VITAMIN D3) 25 MCG
1000 TABLET ORAL DAILY
Status: DISCONTINUED | OUTPATIENT
Start: 2023-11-03 | End: 2023-11-03

## 2023-11-03 RX ADMIN — LORAZEPAM 1 MG: 1 TABLET ORAL at 20:42

## 2023-11-03 RX ADMIN — Medication 10 MG: at 20:42

## 2023-11-03 RX ADMIN — LORAZEPAM 1 MG: 1 TABLET ORAL at 16:14

## 2023-11-03 RX ADMIN — OLANZAPINE 15 MG: 10 TABLET, FILM COATED ORAL at 20:43

## 2023-11-03 RX ADMIN — ERGOCALCIFEROL 1250 MCG: 1.25 CAPSULE ORAL at 20:43

## 2023-11-03 ASSESSMENT — PAIN SCALES - GENERAL
PAINLEVEL_OUTOF10: 0 - NO PAIN
PAINLEVEL_OUTOF10: 0 - NO PAIN

## 2023-11-03 ASSESSMENT — COLUMBIA-SUICIDE SEVERITY RATING SCALE - C-SSRS
6. HAVE YOU EVER DONE ANYTHING, STARTED TO DO ANYTHING, OR PREPARED TO DO ANYTHING TO END YOUR LIFE?: NO
1. SINCE LAST CONTACT, HAVE YOU WISHED YOU WERE DEAD OR WISHED YOU COULD GO TO SLEEP AND NOT WAKE UP?: NO

## 2023-11-03 ASSESSMENT — PAIN - FUNCTIONAL ASSESSMENT
PAIN_FUNCTIONAL_ASSESSMENT: 0-10
PAIN_FUNCTIONAL_ASSESSMENT: 0-10
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
## 2023-11-03 NOTE — PROGRESS NOTES
Occupational Therapy                 Therapy Communication Note    Patient Name: Alexander Zavala  MRN: [id number]  Today's Date: 11/3/2023     Discipline: Occupational Therapy    Missed Visit Reason: Missed Visit Reason: Other (Comment) (Per RN, pt is currently not appropriate for OT eval. Pt has been in bed and nursing has not been able to do an assessment on him yet.)    Missed Time: Attempt

## 2023-11-03 NOTE — CARE PLAN
Attempted to assess and interview and pt is mumbling nonsense and would not participate with interview and assessment. Will need to attempt again tomorrow.

## 2023-11-03 NOTE — CARE PLAN
"The patient's goals for the shift include \"I don't know\"    The clinical goals for the shift include medication compliance    Over the shift, the patient did not make progress toward the following goals. Barriers to progression include psychosis. Recommendations to address these barriers include medication compliance. Pt rested quietly through the night. No PRNs needed, no changes.    "

## 2023-11-03 NOTE — PROGRESS NOTES
Physical Therapy                 Therapy Communication Note    Patient Name: Alexander Zavala  MRN: [id number]  Today's Date: 11/3/2023     Discipline: Physical Therapy    Missed Visit Reason: Missed Visit Reason:  (No Skilled Needs). REYNOLDS reports pt is ambulating independently on unit. No acute PT needs at this time; will discontinue order. Notified Dr. Talbert.     Missed Time: Attempt

## 2023-11-03 NOTE — CARE PLAN
Still very difficult to engage; consistently falling asleep mid question;  He did, however, agree to sign the Application for Voluntary Admission and the [facility] Medicare Form.  Sw to continue to follow.

## 2023-11-03 NOTE — CONSULTS
Inpatient consult to Medicine  Consult performed by: JAMIE Briones-CNP  Consult ordered by: Clinton Talbert MD  Reason for consult: Medical Management          Reason For Consult  Medical Management    History Of Present Illness  Alexander Zavala is a 38 y.o. male with a PMH of Schizoaffective Disorder, Bipolar Type, anxiety, and methamphetamine use who was brought by police to local ED for evaluation. Reports auditory hallucinations. In the ED, VSS. Labs, including chemistry, troponin, lactate and UA were all WNL. Urine tox negative. He was cleared by EPAT for psychiatric admission. Consulted for medical management. Multiple attempts by myself and another provider on our service to evaluate patient but was found to be sedated or not cooperative. This afternoon was lying in bed and minimal effort to participate in evaluation. Speech is very garbled and soft and difficult to interpret. Staff report slow verbal response and minimal eye contact. Has been compliant with medications. Continues to have auditory hallucinations. History obtained via EMR and staff.      Past Medical History  Schizoaffective Disorder  Bipolar Disorder  Anxiety    Surgical History  He has no past surgical history on file.     Social History  He reports that he has been smoking cigarettes. He has never used smokeless tobacco. He reports current drug use. No history on file for alcohol use.    Family History  No family history on file.     Allergies  Patient has no known allergies.    Review of Systems  Patient unable to provide     Physical Exam  Constitutional: Responds to name; minimal effort to participate or engage in conversation; appears withdrawn.   Eyes: EOM's intact  HEENT: Normocephalic, Atraumatic. Oral mucosa moist.   Neck: Unable to assess.   Lungs: Unable to assess. Respirations even and unlabored on room air.   Heart: Unable to assess.  Abdomen: Unable to assess.  MS/Extremities: BARNES x 4.   Neuro: No focal deficits; gross  motor and sensation intact.   Skin: Unable to assess.  Psych: Flat.        Last Recorded Vitals  /69 (BP Location: Right arm, Patient Position: Sitting)   Pulse 77   Temp 36.7 °C (98.1 °F) (Temporal)   Resp 18   Wt 76 kg (167 lb 8.8 oz)   SpO2 100%     Relevant Results  Scheduled medications  ergocalciferol, 1,250 mcg, oral, Weekly  LORazepam, 1 mg, oral, TID  melatonin, 10 mg, oral, Nightly  OLANZapine, 15 mg, oral, Once  [START ON 11/4/2023] OLANZapine, 20 mg, oral, Nightly      Continuous medications     PRN medications  PRN medications: acetaminophen, alum-mag hydroxide-simeth, diphenhydrAMINE **OR** diphenhydrAMINE, haloperidol **OR** haloperidol lactate, hydrOXYzine pamoate, LORazepam **OR** LORazepam, nicotine polacrilex       Assessment/Plan   38 y.o. male with a PMH of Schizoaffective Disorder, Bipolar Type, anxiety, and methamphetamine use who was brought by police to local ED for evaluation. Reports auditory hallucinations. In the ED, VSS. Labs, including chemistry, troponin, lactate and UA were all WNL. Urine tox negative. He was cleared by [facility] for psychiatric admission. Consulted for medical management.     Low TSH  -TSH 0.18, Thyroxine 7.6  -Will add Free T4 to assess for possible hyperthyroidism  -Unfortunately difficult to make diagnosis at this time as not able to do exam or obtain subjective data  -Check lipids  -If Free T4 abnormal, would consider endocrine consult    Auditory Hallucinations  -H/O Schizoaffective disorder, bipolar type, anxiety  -Under the care of psychiatry for management.     Vitamin D Insufficiency  -Level 23  -Add daily supplementation    Disposition  -Plan of care discussed with attending.     Thank you for the consult. Please call/message via secure chat with medical questions.     Kindra uPckett, JAMIE-CNP

## 2023-11-03 NOTE — CARE PLAN
Patient in bed this shift, only came out for meals. Pt unwilling to participate in assessment this shift. No complaints of pain or discomfort. Medication compliant. Q15 minute checks to be maintained throughout shift for safety.

## 2023-11-03 NOTE — NURSING NOTE
"Upon assessment pt is lying in bed, withdrawn but engaged with assessment. Pt denied anxiety, reports \"pretty high\" depression. Pt denied pain. When asked about SI and HI pt stated \"I think someone is after me, I think people can hear my thoughts.\". Pt reported audio and visual hallucinations of \"ghosts, really weird ghosts.\" Pt was incoherent at times, disorganized, paranoid. Mumbling to self. Pt reported that he used to have strengths such as music and art but he doesn't anymore. Pt could not give me any goals or coping skills. Pt took all PM medications.  "

## 2023-11-04 PROCEDURE — 2500000004 HC RX 250 GENERAL PHARMACY W/ HCPCS (ALT 636 FOR OP/ED): Performed by: NURSE PRACTITIONER

## 2023-11-04 PROCEDURE — 2500000001 HC RX 250 WO HCPCS SELF ADMINISTERED DRUGS (ALT 637 FOR MEDICARE OP): Performed by: PSYCHIATRY & NEUROLOGY

## 2023-11-04 PROCEDURE — 2500000001 HC RX 250 WO HCPCS SELF ADMINISTERED DRUGS (ALT 637 FOR MEDICARE OP): Performed by: NURSE PRACTITIONER

## 2023-11-04 PROCEDURE — 1240000001 HC SEMI-PRIVATE BH ROOM DAILY

## 2023-11-04 PROCEDURE — 99232 SBSQ HOSP IP/OBS MODERATE 35: CPT | Performed by: PSYCHIATRY & NEUROLOGY

## 2023-11-04 RX ADMIN — LORAZEPAM 1 MG: 1 TABLET ORAL at 14:58

## 2023-11-04 RX ADMIN — Medication 10 MG: at 20:21

## 2023-11-04 RX ADMIN — HALOPERIDOL 5 MG: 5 TABLET ORAL at 18:13

## 2023-11-04 RX ADMIN — OLANZAPINE 20 MG: 10 TABLET, FILM COATED ORAL at 20:21

## 2023-11-04 RX ADMIN — LORAZEPAM 1 MG: 1 TABLET ORAL at 20:21

## 2023-11-04 RX ADMIN — LORAZEPAM 1 MG: 1 TABLET ORAL at 08:45

## 2023-11-04 ASSESSMENT — PAIN SCALES - GENERAL
PAINLEVEL_OUTOF10: 0 - NO PAIN
PAINLEVEL_OUTOF10: 0 - NO PAIN

## 2023-11-04 ASSESSMENT — PAIN - FUNCTIONAL ASSESSMENT
PAIN_FUNCTIONAL_ASSESSMENT: 0-10
PAIN_FUNCTIONAL_ASSESSMENT: 0-10

## 2023-11-04 NOTE — NURSING NOTE
"Pt was interviewed in his bed after he was out on the unit having a snack. Pt laid in bed during assessment and did not make any eye contact or lift his head up. Pt is very withdrawn and seclusive, only coming out for snack and a shower after prompted several times by staff. Pt rated anxiety 8/10 and depression 9/10, he described it is \"feels like I'm trapped inside something.\"  When asked about SI/HI, pt stated not right now, and stated he would make staff aware if these thoughts start to arise throughout the shift. Pt did endorse both auditory and visual hallucinations. Auditory, pt described as \"negative taunts, like people booing me,\" and visual pt stated \"objects move and people's faces change.\" Pt explained when describing hallucinations, \"I'm skipping through time.\" Pt stated his goal is \"to kill myself,\" pt stated he was unable to do anything here and did not have any active thoughts but stated \"I can't believe I'm still alive after what I've put my body through.\" Pt stated \"I can't do anything\" when asked about coping skills, pt asked if he likes to read, pt stated \"I can't read,\" when asked if he likes to watch TV, pt stated \"I interact with the TV and I can't deal with that.\" When asked about strengths, pt responded with \"zero.\" Pt took all scheduled nighttime medications without a problem, no PRNs needed. Pt is currently sleeping in bed without any obvious signs or symptoms. No new orders to carry out at this time.  "

## 2023-11-04 NOTE — NURSING NOTE
"Pt rates anxiety 6/10 and depression 10/10 with no SI/HI reported. He does endorse + auditory hallucinations of \"voices \" telling him \" bad things\", and +visual hallucinations of \" shadows\".  Pt presents as quiet, withdrawn, soft spoken with poor eye contact. Pt been seclusive  to his room today, he has been medication compliant.  An exception was made today for patient to eat meals in his room to reduce stimuli. Will continue to monitor for safety, and encourage PO fluids and meals.   "

## 2023-11-04 NOTE — NURSING NOTE
"Pt came out to the dining area to eat dinner and the quickly went back to his room . This nurse asked pt is he felt like he was ok. Pt states \" I don't know\". This nurse asked pt if the voices were bothering him, his reply was \" yes\". This nurse offered pt Haldol and he accepted without incident and then states \" I'm going to bed\".   "

## 2023-11-04 NOTE — PROGRESS NOTES
"Alexander Zavala is a 38 y.o. year old male patient who is on [facility] admission day 2.      Subjective   Alexander Zavala is a 38 y.o. year old male patient who was personally seen and interviewed, and discussed in morning team rounds. The patient was interviewed alone in his room (interviewed laying in bed, awake and alert), and was easily engaged and relatively cooperative. This late morning, Alexander reports feeling \"not good\" and currently rates his depression at a 10 out of 10. No suicidal ideation or plans were elicited. He also rates her anxiety at a 6 out of 10. Alexander reports experiencing ongoing auditory hallucinations (saying \"bad things\") and visual hallucinations of \"shadows.\" No tactile, olfactory, or gustatory hallucinations were endorsed. He also endorsed experiencing ideas of reference from the [employer]. Alexander continues with paranoia.    Alexander slept 9.25 hours last night (broken).           Objective   Mental Status Exam:   General: Mildly disheveled and dressed in hospital attire, laying in bed, awake and alert.   Appearance: Appears stated age.   Attitude: Calm, and relatively cooperative.   Behavior: No eye contact.   Motor Activity: No agitation or overt psychomotor retardation. No EPS/TD.  Normal gait and station. Normal muscle tone and bulk.   Speech: Regular rate, rhythm, volume and tone, minimally spontaneous,  fluent. Non-pressured. +Paucity of Speech. Mild response lag.   Mood: \"Not good\"   Affect: Withdrawn.   Thought Process: Mild disorganization but is goal directed.   Thought Content: Does not endorse suicidal ideation or any suicide plans.   Does not endorse homicidal ideation.  + paranoia (see HPI).    Thought Perception: Does endorse auditory and visual hallucinations (see HPI). No tactile, olfactory, or gustatory hallucinations elicited.    Cognition: Alert, oriented x 3. Adequate fund of knowledge. No deficit in recent and remote memory. Fair attention and  concentration. Intact language. "   Insight: Fair, as patient recognizes symptoms of  illness and need for recommended treatments.    Judgment: Poor as patient can make some reasonable decisions about ordinary activities of daily living and necessary medical care recommendations.       LABS:  No results found for this or any previous visit (from the past 24 hour(s)).     Last Recorded Vitals  Visit Vitals  /78 (Patient Position: Standing)   Pulse 94   Temp 37.5 °C (99.5 °F)   Resp 18        Intake/Output last 3 Shifts:  No intake/output data recorded.    Relevant Results  Scheduled medications  ergocalciferol, 1,250 mcg, oral, Weekly  LORazepam, 1 mg, oral, TID  melatonin, 10 mg, oral, Nightly  OLANZapine, 20 mg, oral, Nightly      Continuous medications     PRN medications  PRN medications: acetaminophen, alum-mag hydroxide-simeth, diphenhydrAMINE **OR** diphenhydrAMINE, haloperidol **OR** haloperidol lactate, hydrOXYzine pamoate, LORazepam **OR** LORazepam, nicotine polacrilex               Assessment/Plan   1) Schizoaffective Disorder, bipolar type, currently depressed with psychosis (AH, VH, paranoia, IoR)       Plan: 1) trial Zyprexa 15 mg QHS    2) Generalized Anxiety Disorder       Plan: 1) see above    3) Methamphetamine Use Diosrder, severe, dependence       Plan: 1) Refer to outpatient treatment program.    4) Nicotine Dependence       Plan: 1) Nicotine Gum - PRN          Clinton Talbert MD

## 2023-11-04 NOTE — CARE PLAN
Problem: Fall/Injury  Goal: Be free from injury by end of the shift  Outcome: Progressing     Problem: Risk for Suicide  Goal: Identifies supports this shift  Outcome: Progressing     Problem: Sensory Perceptual Alteration as Evidenced by  Goal: Able to discuss content of hallucinations/delusions  Outcome: Progressing       Over the shift, the patient did  make progress toward the following goals.     Pt had an uneventful night, pt staying in bed and slept throughout. No PRNs needed. Pt is currently sleeping in bed without any signs or symptoms of distress. No new orders to carry out at this time.

## 2023-11-05 PROCEDURE — 2500000004 HC RX 250 GENERAL PHARMACY W/ HCPCS (ALT 636 FOR OP/ED): Performed by: NURSE PRACTITIONER

## 2023-11-05 PROCEDURE — 1240000001 HC SEMI-PRIVATE BH ROOM DAILY

## 2023-11-05 PROCEDURE — 2500000001 HC RX 250 WO HCPCS SELF ADMINISTERED DRUGS (ALT 637 FOR MEDICARE OP): Performed by: NURSE PRACTITIONER

## 2023-11-05 PROCEDURE — 99232 SBSQ HOSP IP/OBS MODERATE 35: CPT | Performed by: PSYCHIATRY & NEUROLOGY

## 2023-11-05 RX ADMIN — Medication 10 MG: at 21:08

## 2023-11-05 RX ADMIN — LORAZEPAM 1 MG: 1 TABLET ORAL at 08:54

## 2023-11-05 RX ADMIN — OLANZAPINE 20 MG: 10 TABLET, FILM COATED ORAL at 21:08

## 2023-11-05 RX ADMIN — LORAZEPAM 1 MG: 1 TABLET ORAL at 21:08

## 2023-11-05 RX ADMIN — LORAZEPAM 1 MG: 1 TABLET ORAL at 16:13

## 2023-11-05 ASSESSMENT — COLUMBIA-SUICIDE SEVERITY RATING SCALE - C-SSRS
2. HAVE YOU ACTUALLY HAD ANY THOUGHTS OF KILLING YOURSELF?: NO
6. HAVE YOU EVER DONE ANYTHING, STARTED TO DO ANYTHING, OR PREPARED TO DO ANYTHING TO END YOUR LIFE?: NO
1. SINCE LAST CONTACT, HAVE YOU WISHED YOU WERE DEAD OR WISHED YOU COULD GO TO SLEEP AND NOT WAKE UP?: NO
5. HAVE YOU STARTED TO WORK OUT OR WORKED OUT THE DETAILS OF HOW TO KILL YOURSELF? DO YOU INTEND TO CARRY OUT THIS PLAN?: NO

## 2023-11-05 ASSESSMENT — PAIN - FUNCTIONAL ASSESSMENT
PAIN_FUNCTIONAL_ASSESSMENT: 0-10
PAIN_FUNCTIONAL_ASSESSMENT: 0-10

## 2023-11-05 ASSESSMENT — PAIN SCALES - GENERAL
PAINLEVEL_OUTOF10: 0 - NO PAIN
PAINLEVEL_OUTOF10: 0 - NO PAIN

## 2023-11-05 NOTE — CARE PLAN
"Patient is compliant with medication. He has spent shift in room, he is seclusive to self. He does not come out for meals, he is soft/ spoken, mumbles words, with short responses. He denied anxiety, Depression \"13\", denied SI/HI, and endorses AH/VH. During weekly weights earlier approx. 1613 Pt. Told PCNA \"I want it to end\" On license of [facility] then notified this nurse. This nurse went in with afternoon medication scheduled and sat with patient to decide if a 1:1 sitter is necessary. Patient states, \"I can't change the future, I just want it to end, people are doing this to me.\" Patient educated in [place] for safety. Patient is unwilling/unable to contract for safety at this time. This nurse called Dr. Talbert with the change of condition and an order was placed for a 1:1 sitter. Patient made aware by this nurse that if he can't CFS he will need to have a staff member sit with him. Currently RONNIE Preciado is sitting with patient.   "

## 2023-11-05 NOTE — CARE PLAN
"The patient's goals for the shift include \"to have snack and go to sleep\"    The clinical goals for the shift include medication compliance    Over the shift, the patient did not make progress toward the following goals. Barriers to progression include agitation. Recommendations to address these barriers include encouraging medication compliance.    Earlier in the shift pt was complaining of pain in her left leg, 1+ edema present. Pt seen by NP, given motrin and tylenol, and encouraged elevation. CARLOS A gordon will be encouraged this morning. Pt rested quietly through the night after taking the tylenol and motrin.  "

## 2023-11-05 NOTE — NURSING NOTE
"Upon assessment pt is in his room lying down. Flat affect, withdrawn. Pt reported feeling nervous, and depression 13/10. Denied pain, SI/HI. Pt endorsed auditory hallucinations of voices, but would not elaborate further. Pt endorsed visual hallucinations of \"traces of people\". Pt reported goal as to have snack and go to sleep. Pt came out for snack, but is not social with peers. Pt took all PM medications.  "

## 2023-11-05 NOTE — PROGRESS NOTES
Alexander Zavala is a 38 y.o. year old male patient who is on U admission day 3.      Subjective   Alexander Zavala is a 38 y.o. year old male patient who was personally seen and interviewed, and discussed in morning team rounds. The patient was interviewed alone in his room (interviewed laying in bed, mostly awake and alert), and was able to be engaged and somewhat cooperative. This morning, Alexander did deny having any  suicidal ideation or suicide plans. Alexander does report experiencing ongoing auditory hallucinations and visual hallucinations, but would not elaborate further. He has also endorsed experiencing ideas of reference from the [employer]. Alexander continues with paranoia.    Alexander slept 10 hours last night (unbroken).  Alexander did take his Zyprexa last evening.          Objective   Mental Status Exam:   General: Mildly disheveled and dressed in hospital attire, laying in bed, awake and alert.   Appearance: Appears stated age.   Attitude: Calm, and relatively cooperative.   Behavior: Almost no eye contact.   Motor Activity: No agitation or overt psychomotor retardation. No EPS/TD.  Normal gait and station. Normal muscle tone and bulk.   Speech: Regular rate, rhythm, volume and tone, minimally spontaneous,  fluent. Non-pressured. +Paucity of Speech. Mild response lag.   Mood: (would not report).   Affect: Withdrawn.   Thought Process: Mild disorganization but can be goal directed.   Thought Content: Does not endorse suicidal ideation or any suicide plans.   Does not endorse homicidal ideation.  + paranoia (see HPI).    Thought Perception: Does endorse auditory and visual hallucinations (see HPI).   Cognition: Alert, oriented x 3. Adequate fund of knowledge. No deficit in recent and remote memory. Fair attention and  concentration. Intact language.   Insight: Fair, as patient recognizes symptoms of  illness and need for recommended treatments.    Judgment: Poor as patient can make some reasonable decisions about ordinary  activities of daily living and necessary medical care recommendations.       LABS:  No results found for this or any previous visit (from the past 24 hour(s)).     Last Recorded Vitals  Visit Vitals  /75 (Patient Position: Sitting)   Pulse 104   Temp 36.6 °C (97.9 °F)   Resp 18        Intake/Output last 3 Shifts:  No intake/output data recorded.    Relevant Results  Scheduled medications  ergocalciferol, 1,250 mcg, oral, Weekly  LORazepam, 1 mg, oral, TID  melatonin, 10 mg, oral, Nightly  OLANZapine, 20 mg, oral, Nightly      Continuous medications     PRN medications  PRN medications: acetaminophen, alum-mag hydroxide-simeth, diphenhydrAMINE **OR** diphenhydrAMINE, haloperidol **OR** haloperidol lactate, hydrOXYzine pamoate, LORazepam **OR** LORazepam, nicotine polacrilex               Assessment/Plan   1) Schizoaffective Disorder, bipolar type, currently depressed with psychosis (AH, VH, paranoia, IoR)       Plan: 1) trial Zyprexa 15 -> 20 mg QHS    2) Generalized Anxiety Disorder       Plan: 1) see above    3) Methamphetamine Use Diosrder, severe, dependence       Plan: 1) Refer to outpatient treatment program.    4) Nicotine Dependence       Plan: 1) Nicotine Gum - PRN          Clinton Talbert MD

## 2023-11-06 PROCEDURE — 2500000001 HC RX 250 WO HCPCS SELF ADMINISTERED DRUGS (ALT 637 FOR MEDICARE OP): Performed by: PSYCHIATRY & NEUROLOGY

## 2023-11-06 PROCEDURE — 1240000001 HC SEMI-PRIVATE BH ROOM DAILY

## 2023-11-06 PROCEDURE — 2500000004 HC RX 250 GENERAL PHARMACY W/ HCPCS (ALT 636 FOR OP/ED): Performed by: PSYCHIATRY & NEUROLOGY

## 2023-11-06 PROCEDURE — 2500000001 HC RX 250 WO HCPCS SELF ADMINISTERED DRUGS (ALT 637 FOR MEDICARE OP): Performed by: NURSE PRACTITIONER

## 2023-11-06 PROCEDURE — 2500000004 HC RX 250 GENERAL PHARMACY W/ HCPCS (ALT 636 FOR OP/ED): Performed by: NURSE PRACTITIONER

## 2023-11-06 PROCEDURE — 99233 SBSQ HOSP IP/OBS HIGH 50: CPT | Performed by: NURSE PRACTITIONER

## 2023-11-06 RX ORDER — OLANZAPINE 5 MG/1
30 TABLET ORAL NIGHTLY
Status: DISCONTINUED | OUTPATIENT
Start: 2023-11-06 | End: 2023-11-22 | Stop reason: HOSPADM

## 2023-11-06 RX ORDER — FLUOXETINE 10 MG/1
10 CAPSULE ORAL ONCE
Status: COMPLETED | OUTPATIENT
Start: 2023-11-06 | End: 2023-11-06

## 2023-11-06 RX ORDER — FLUOXETINE HYDROCHLORIDE 20 MG/1
20 CAPSULE ORAL DAILY
Status: DISCONTINUED | OUTPATIENT
Start: 2023-11-07 | End: 2023-11-09

## 2023-11-06 RX ADMIN — Medication 10 MG: at 20:18

## 2023-11-06 RX ADMIN — LORAZEPAM 1 MG: 1 TABLET ORAL at 20:17

## 2023-11-06 RX ADMIN — NICOTINE POLACRILEX 2 MG: 2 GUM, CHEWING ORAL at 18:22

## 2023-11-06 RX ADMIN — FLUOXETINE 10 MG: 10 CAPSULE ORAL at 13:02

## 2023-11-06 RX ADMIN — NICOTINE POLACRILEX 2 MG: 2 GUM, CHEWING ORAL at 13:04

## 2023-11-06 RX ADMIN — HALOPERIDOL 5 MG: 5 TABLET ORAL at 13:30

## 2023-11-06 RX ADMIN — LORAZEPAM 1 MG: 1 TABLET ORAL at 08:45

## 2023-11-06 RX ADMIN — DIPHENHYDRAMINE HYDROCHLORIDE 50 MG: 50 CAPSULE ORAL at 13:30

## 2023-11-06 RX ADMIN — LORAZEPAM 2 MG: 1 TABLET ORAL at 13:30

## 2023-11-06 RX ADMIN — NICOTINE POLACRILEX 2 MG: 2 GUM, CHEWING ORAL at 20:18

## 2023-11-06 RX ADMIN — ACETAMINOPHEN 650 MG: 325 TABLET ORAL at 20:18

## 2023-11-06 RX ADMIN — OLANZAPINE 30 MG: 10 TABLET, FILM COATED ORAL at 20:17

## 2023-11-06 RX ADMIN — HYDROXYZINE PAMOATE 50 MG: 50 CAPSULE ORAL at 13:03

## 2023-11-06 ASSESSMENT — PAIN SCALES - GENERAL
PAINLEVEL_OUTOF10: 3
PAINLEVEL_OUTOF10: 0 - NO PAIN
PAINLEVEL_OUTOF10: 0 - NO PAIN

## 2023-11-06 ASSESSMENT — PAIN DESCRIPTION - LOCATION: LOCATION: BACK

## 2023-11-06 ASSESSMENT — COLUMBIA-SUICIDE SEVERITY RATING SCALE - C-SSRS
1. SINCE LAST CONTACT, HAVE YOU WISHED YOU WERE DEAD OR WISHED YOU COULD GO TO SLEEP AND NOT WAKE UP?: NO
2. HAVE YOU ACTUALLY HAD ANY THOUGHTS OF KILLING YOURSELF?: NO
5. HAVE YOU STARTED TO WORK OUT OR WORKED OUT THE DETAILS OF HOW TO KILL YOURSELF? DO YOU INTEND TO CARRY OUT THIS PLAN?: NO
6. HAVE YOU EVER DONE ANYTHING, STARTED TO DO ANYTHING, OR PREPARED TO DO ANYTHING TO END YOUR LIFE?: NO

## 2023-11-06 ASSESSMENT — PAIN - FUNCTIONAL ASSESSMENT
PAIN_FUNCTIONAL_ASSESSMENT: 0-10

## 2023-11-06 ASSESSMENT — PAIN DESCRIPTION - ORIENTATION: ORIENTATION: LOWER

## 2023-11-06 NOTE — CARE PLAN
Problem: Fall/Injury  Goal: Be free from injury by end of the shift  Outcome: Progressing     Problem: Risk for Suicide  Goal: No self harm this shift  Outcome: Progressing     Problem: Sensory Perceptual Alteration as Evidenced by  Goal: Verbalizes reduction in hallucinations/delusions  Outcome: Progressing       Over the shift, the patient did make progress toward the following goals.      Pt had an uneventful night. Pt slept throughout. No PRNs needed. Pt is currently sleeping in bed without any obvious signs or symptoms of distress. No new orders to carry out at this time. Sitter at bedside maintained.

## 2023-11-06 NOTE — NURSING NOTE
"Pt was interviewed in his room while he was lying in bed. Pt is withdrawn and very soft spoken. Pt did not come out of his room for snack. Pt rated his anxiety 4/10,depression 11/10, pt denies SI/HI. Pt did endorse both auditory and visual hallucinations but was unable to described, just stated \"they are imaginary flashbacks.\" Pt's goal is \"to have a better day tomorrow.\" Pt was unable to provide  a coping skill or any strengths. Pt is currently sleeping in bed without any obvious signs or symptoms of distress. No new orders to carry out at this time. 1:1 observer maintained at bedside (Lo TRUJILLO).  "

## 2023-11-06 NOTE — PROGRESS NOTES
Occupational Therapy     REHAB Therapy Assessment - attempt    Patient Name: Alexander Zavala  MRN: [id number]  Today's Date: 11/6/2023  Time: 09:09      Activity Assessment:  Initial Assessment  Additional Comments: OT evaluation attempted. Pt sleeping with sitter present, does not wake after multiple attempts. No evaluation completed at this time.
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
## 2023-11-06 NOTE — CARE PLAN
Patient still being stabilized; encouraged to shower today;  taking his medications willingly;  his antipsychotic is being increased as he is still endorsing auditory and visual hallucinations;  sw to follow.

## 2023-11-07 PROCEDURE — 1240000001 HC SEMI-PRIVATE BH ROOM DAILY

## 2023-11-07 PROCEDURE — 2500000001 HC RX 250 WO HCPCS SELF ADMINISTERED DRUGS (ALT 637 FOR MEDICARE OP): Performed by: NURSE PRACTITIONER

## 2023-11-07 PROCEDURE — 2500000004 HC RX 250 GENERAL PHARMACY W/ HCPCS (ALT 636 FOR OP/ED): Performed by: NURSE PRACTITIONER

## 2023-11-07 PROCEDURE — 99233 SBSQ HOSP IP/OBS HIGH 50: CPT | Performed by: NURSE PRACTITIONER

## 2023-11-07 RX ORDER — LITHIUM CARBONATE 150 MG/1
150 CAPSULE ORAL 2 TIMES DAILY
Status: DISCONTINUED | OUTPATIENT
Start: 2023-11-07 | End: 2023-11-08

## 2023-11-07 RX ORDER — HALOPERIDOL 5 MG/1
5 TABLET ORAL 2 TIMES DAILY
Status: DISCONTINUED | OUTPATIENT
Start: 2023-11-07 | End: 2023-11-22 | Stop reason: HOSPADM

## 2023-11-07 RX ADMIN — LORAZEPAM 1 MG: 1 TABLET ORAL at 20:17

## 2023-11-07 RX ADMIN — OLANZAPINE 30 MG: 10 TABLET, FILM COATED ORAL at 20:18

## 2023-11-07 RX ADMIN — FLUOXETINE 20 MG: 20 CAPSULE ORAL at 08:56

## 2023-11-07 RX ADMIN — HALOPERIDOL 5 MG: 5 TABLET ORAL at 20:18

## 2023-11-07 RX ADMIN — LORAZEPAM 1 MG: 1 TABLET ORAL at 15:28

## 2023-11-07 RX ADMIN — LITHIUM CARBONATE 150 MG: 150 CAPSULE, GELATIN COATED ORAL at 20:18

## 2023-11-07 RX ADMIN — Medication 10 MG: at 20:18

## 2023-11-07 RX ADMIN — HALOPERIDOL 5 MG: 5 TABLET ORAL at 13:16

## 2023-11-07 RX ADMIN — LORAZEPAM 1 MG: 1 TABLET ORAL at 08:56

## 2023-11-07 ASSESSMENT — PAIN - FUNCTIONAL ASSESSMENT
PAIN_FUNCTIONAL_ASSESSMENT: 0-10
PAIN_FUNCTIONAL_ASSESSMENT: 0-10

## 2023-11-07 ASSESSMENT — PAIN SCALES - GENERAL
PAINLEVEL_OUTOF10: 0 - NO PAIN
PAINLEVEL_OUTOF10: 0 - NO PAIN

## 2023-11-07 NOTE — PROGRESS NOTES
Occupational Therapy     REHAB Therapy Assessment & Treatment    Patient Name: Alexander Zavala  MRN: [id number]  Today's Date: 11/7/2023  Time: 08:58    Activity Assessment:  Initial Assessment  Additional Comments: OT evaluation attempted, and pt continues to decline.

## 2023-11-07 NOTE — GROUP NOTE
Group Topic: Other   Group Date: 11/7/2023  Start Time: 1500  End Time: 1550  Facilitators: MANDA Robins   Department: [facility] REHAB THERAPY VIRTUAL    Number of Participants: 3   Group Focus: leisure skills and social skills  Treatment Modality: Other: Recreation Therapy  Interventions utilized were mental fitness and other    Purpose: other: increase socialization, increase stimulation, increase mental fitness    Name: Alexander Zavala YOB: 1985   MR: [id number]      Facilitator: Recreational Therapist  Level of Participation: did not attend  Quality of Participation:  did not attend  Interactions with others:  did not attend  Mood/Affect:  did not attend  Triggers (if applicable): n/a  Cognition:  did not attend  Progress: None  Comments: pt sleeping in room at this time  Plan: continue with services

## 2023-11-07 NOTE — NURSING NOTE
"Pt has remained in bed earlier this shift with the exception of meals.  Pt has a good appetite and finishes all meals.  Pt does not come out for group therapy and does not socialize with peers.  Pt has been med compliant today and time was spent trying to help the pt open up and talk.  Pt did shower today and was continued on 1:1 with nurse assist while in shower.  Pt \"grunted\" loudly while showering, did wash up thoroughly, and had two outbursts yelling, \"Fuck...........\".  Pt's outburst sounded aggressive however his presentation was not aggressive as he continued to wash up.  Pt appeared to be internally stimulated and/or having an episode of PTSD where he became extremely distraught.  Pt was given PRN medications (Ativan 2mg, Haldol 5mg and Benadryl 50mg) orally and pt was receptive to this.  Pt slept until dinner time and after dinner pt requested nicotine gum and talked briefly about a previous hospitalization which is the most that he has talked to this nurse this shift.  Will continue 1:1 observation and will continue to monitor closely.  "

## 2023-11-07 NOTE — CARE PLAN
"The patient's goals for the shift include \"Have a better day tomorrow\"    The clinical goals for the shift include medicaton compliance    Over the shift, the patient did not make progress toward the following goals. Barriers to progression include lack of motivation. Recommendations to address these barriers include pushing self to get out of bed during the day.    "

## 2023-11-07 NOTE — CARE PLAN
Problem: Fall/Injury  Goal: Be free from injury by end of the shift  Outcome: Progressing     Problem: Risk for Suicide  Goal: Accepts medications as prescribed/needed this shift  Outcome: Progressing  Goal: Identifies supports this shift  Outcome: Progressing  Goal: No self harm this shift  Outcome: Progressing     Problem: Sensory Perceptual Alteration as Evidenced by  Goal: Verbalizes reduction in hallucinations/delusions  Outcome: Progressing    Over the shift, the patient did make progress toward the following goals.     Pt had an uneventful night, pt slept quite well. No PRNs needed. Pt is currently sleeping in bed without any signs or symptoms of distress. No new orders to carry out at this time. Sitter at bedside maintained.
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
## 2023-11-07 NOTE — NURSING NOTE
Pt was in bed almost all day this writer tried to talk to him and ask him questions numerous times Pt would just lye in bed or when he was up in lounge he would just sit there eating and would not answer that question This writer was Unable to assess patient today.

## 2023-11-07 NOTE — NURSING NOTE
"Pt was interviewed in his bed. Pt did not open eyes during assessment. Pt is hard to understand as he mumbles and seemed very tired. Pt rated anxiety 7/10, depression 8/10, denies SI/HI. Pt did endorse both auditory and visual hallucinations. Pt stated his auditory hallucinations were \"little kids voices,\" and stated visual were \"nothing too major.\" Pt was unable to elaborate on visual hallucinations. Pt stated he had pain 3/10 in his lower back, pt educated about getting out of bed more and moving around, PRN tylenol 650mg given at 2020 with relief. PRN angie gum given as well. Pt took all scheduled medications without a problem. Pt was unable to give a goal, coping skill or strengths. Pt is currently sleeping in bed without any obvious signs or symptoms of distress. No new orders to carry out at this time. Sitter maintained at bedside.   "

## 2023-11-08 PROCEDURE — 1240000001 HC SEMI-PRIVATE BH ROOM DAILY

## 2023-11-08 PROCEDURE — 2500000004 HC RX 250 GENERAL PHARMACY W/ HCPCS (ALT 636 FOR OP/ED): Performed by: NURSE PRACTITIONER

## 2023-11-08 PROCEDURE — 2500000001 HC RX 250 WO HCPCS SELF ADMINISTERED DRUGS (ALT 637 FOR MEDICARE OP): Performed by: NURSE PRACTITIONER

## 2023-11-08 PROCEDURE — 99233 SBSQ HOSP IP/OBS HIGH 50: CPT | Performed by: NURSE PRACTITIONER

## 2023-11-08 RX ORDER — LORAZEPAM 0.5 MG/1
0.75 TABLET ORAL
Status: DISCONTINUED | OUTPATIENT
Start: 2023-11-08 | End: 2023-11-09

## 2023-11-08 RX ORDER — LORAZEPAM 1 MG/1
1 TABLET ORAL NIGHTLY
Status: DISCONTINUED | OUTPATIENT
Start: 2023-11-08 | End: 2023-11-14

## 2023-11-08 RX ORDER — LITHIUM CARBONATE 300 MG/1
300 TABLET, FILM COATED, EXTENDED RELEASE ORAL NIGHTLY
Status: DISCONTINUED | OUTPATIENT
Start: 2023-11-08 | End: 2023-11-09

## 2023-11-08 RX ORDER — PRAZOSIN HYDROCHLORIDE 1 MG/1
1 CAPSULE ORAL NIGHTLY
Status: DISCONTINUED | OUTPATIENT
Start: 2023-11-08 | End: 2023-11-09

## 2023-11-08 RX ADMIN — LITHIUM CARBONATE 150 MG: 150 CAPSULE, GELATIN COATED ORAL at 09:00

## 2023-11-08 RX ADMIN — PRAZOSIN HYDROCHLORIDE 1 MG: 1 CAPSULE ORAL at 20:54

## 2023-11-08 RX ADMIN — OLANZAPINE 30 MG: 10 TABLET, FILM COATED ORAL at 20:53

## 2023-11-08 RX ADMIN — HALOPERIDOL 5 MG: 5 TABLET ORAL at 08:37

## 2023-11-08 RX ADMIN — LITHIUM CARBONATE 300 MG: 300 TABLET, EXTENDED RELEASE ORAL at 20:53

## 2023-11-08 RX ADMIN — HALOPERIDOL 5 MG: 5 TABLET ORAL at 20:53

## 2023-11-08 RX ADMIN — FLUOXETINE 20 MG: 20 CAPSULE ORAL at 08:37

## 2023-11-08 RX ADMIN — Medication 10 MG: at 20:53

## 2023-11-08 RX ADMIN — LORAZEPAM 1 MG: 1 TABLET ORAL at 08:37

## 2023-11-08 RX ADMIN — LORAZEPAM 0.75 MG: 0.5 TABLET ORAL at 15:42

## 2023-11-08 RX ADMIN — LORAZEPAM 1 MG: 1 TABLET ORAL at 20:53

## 2023-11-08 ASSESSMENT — PAIN - FUNCTIONAL ASSESSMENT
PAIN_FUNCTIONAL_ASSESSMENT: 0-10
PAIN_FUNCTIONAL_ASSESSMENT: 0-10

## 2023-11-08 ASSESSMENT — PAIN SCALES - GENERAL
PAINLEVEL_OUTOF10: 0 - NO PAIN
PAINLEVEL_OUTOF10: 0 - NO PAIN

## 2023-11-08 NOTE — NURSING NOTE
Pt has sitter at bedside  Pt took his night time medications and watched a little tv  Pt went to bed and slept all night  Pt had an eventful night

## 2023-11-08 NOTE — NURSING NOTE
"Pt interview in the front unge  Pt is eating his snack and watching tv  Pt stated his goal \" get sleep\" his strength \" I like art\" and his coping skill \" I dont Know\"  Pt rated his anxiety 5/10  depression 7/10  no thought of harming self or others  no audio hallucinations but is seeing visual things like shapes  Pt is appropriate with his answers to the questions at this time  "

## 2023-11-08 NOTE — CARE PLAN
"Met with patient today; more verbal, stated he wants \"to get the f* out of here. How do I get out of here? Can my guardian sign me out?\"  Asked him if he had a guardian and he said no.  Said he wants to go to his friend's in [place], named Daren; he does not know his phone number  or address; told him we need to verify this in order to send him there so he needs to find this out;  he denied knowing how to do this; also denied knowing who else to call;  stated he has not used Meth in \"a while so I don't have a problem\" denied want or need to go to inpatient rehab;  asked him if he has a CPST/ at [facility] who could help; stated he only sees \"Dr. Perez\";  emailed Nadja Ellis at [facility] who verified that only provider giving services to patient is Dr. Perez; emailed Caleb with request for him to be assigned a CPST to help him.  Patient seemed somnolent as he was lying in the bed with his eyes closed but was verbal and responsive when addressed; Encouraged him to go to groups: \"I don't need to do that, those are so stupid, man.\" Told him to get out of bed, sit in the milieu and watch TV, so as to help him show the Treatment team he is improving and moving toward stability for discharge.  Patient did not respond.  Sw to engage.   "

## 2023-11-08 NOTE — CARE PLAN
"The patient's goals for the shift include \"get some sleep\"    The clinical goals for the shift include take medications    Over the shift, the patient did not make progress toward the following goals. Barriers to progression include understanding medications . Recommendations to address these barriers include more education on medications .    "

## 2023-11-08 NOTE — CARE PLAN
"Patient in bed this shift, out for meals. Pt made no eye contact, response lag and soft spoken. Rated anxiety 3/10 and depression 10/10. Denied SI/HI. Pt endorsed auditory hallucinations of \"flashes from the past\" and endorses visual hallucinations, pt would not describe visual hallucinations. No complaints of pain or discomfort. Medication compliant. Pt would not state coping skill, strength or goal. 1:1 observation maintained, sitter within eyesight of pt.   "
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
## 2023-11-08 NOTE — PROGRESS NOTES
REHAB Therapy Assessment & Treatment    Patient Name: Alexander Zavala  MRN: [id number]  Today's Date: 11/8/2023    Recreation Therapy assessment attempted on this date: Assessment not completed  as pt is uncooperative and does not engage.   Activity Assessment:  Initial Assessment  Attention Span: 5 Minutes or less  Cognitive Behavior Status/Orientation: Other (Comment) (unable to assess)  Crisis Triggers: Other (Comment) (unable to assess)  Emotional Concerns/Mood/Affect:  (unable to assess)  Hearing:  (unable to assess)  Memory:  (unable to assess)  Motivation Level:  (unable to assess)  Negative Coping Skills:  (unable to assess)  Speech/Communication/Socialization:  (unable to assess)  Vision:  (unable to assess)    Leisure Survey:  [facility]  Activity Preference:  (unable to assess)  Activity Tolerance:  (unable to assess)  At Home ADL Deficits:  (unable to assess)  Barriers to Leisure Participation:  (unable to assess)  Community Resources:  (unable to assess)  Creative Activities:  (unable to assess)  Education/School: unable to assess  Following Directions:  (unable to assess)  Leisure Interests:  (unable to assess)  Living Arrangement:  (unable to assess)  Motivators for Recreation/Leisure Involvement:  (unable to assess)  Outdoor Activities:  (unable to assess)  Passive Games:  (unable to assess)  Patient/Family Education Needs: unable to assess  Patient Strengths: unable to assess  Patient Weakness: unable to assess  Physial Activity:  (unable to assess)  Social/Group Activities:  (unable to assess)  Solitary Activities:  (unable to assess)  Special Hobbies: unable to assess  Spectator Events:  (unable to assess)  Transportation:  (unable to assess)  Work/Volunteer: unable to assess  Additional Comments: This is third attempt to interview pt. Pt uncooperative and does not engage with CTRS, keeping blanket over head or turning to face opposite directioin. Assessment unable to be  completed.

## 2023-11-08 NOTE — PROGRESS NOTES
Occupational Therapy                 Therapy Communication Note    Patient Name: Alexander Zavala  MRN: [id number]  Today's Date: 11/8/2023     Discipline: Occupational Therapy    Missed Visit Reason: Missed Visit Reason: Cancel (Pt has been attempted three times for OT evalulation and continues to refuse. OT to cancel consult at this time. Re-consult when pt becomes more appropriate.)    Missed Time: Cancel

## 2023-11-09 PROCEDURE — 2500000001 HC RX 250 WO HCPCS SELF ADMINISTERED DRUGS (ALT 637 FOR MEDICARE OP): Performed by: PSYCHIATRY & NEUROLOGY

## 2023-11-09 PROCEDURE — 99233 SBSQ HOSP IP/OBS HIGH 50: CPT | Performed by: NURSE PRACTITIONER

## 2023-11-09 PROCEDURE — 1240000001 HC SEMI-PRIVATE BH ROOM DAILY

## 2023-11-09 PROCEDURE — 2500000004 HC RX 250 GENERAL PHARMACY W/ HCPCS (ALT 636 FOR OP/ED): Performed by: NURSE PRACTITIONER

## 2023-11-09 PROCEDURE — 2500000001 HC RX 250 WO HCPCS SELF ADMINISTERED DRUGS (ALT 637 FOR MEDICARE OP): Performed by: NURSE PRACTITIONER

## 2023-11-09 PROCEDURE — 2500000004 HC RX 250 GENERAL PHARMACY W/ HCPCS (ALT 636 FOR OP/ED): Performed by: PSYCHIATRY & NEUROLOGY

## 2023-11-09 RX ORDER — PRAZOSIN HYDROCHLORIDE 1 MG/1
1 CAPSULE ORAL 2 TIMES DAILY
Status: DISCONTINUED | OUTPATIENT
Start: 2023-11-09 | End: 2023-11-22 | Stop reason: HOSPADM

## 2023-11-09 RX ORDER — LITHIUM CARBONATE 450 MG/1
450 TABLET ORAL NIGHTLY
Status: DISCONTINUED | OUTPATIENT
Start: 2023-11-09 | End: 2023-11-22 | Stop reason: HOSPADM

## 2023-11-09 RX ORDER — LORAZEPAM 1 MG/1
1 TABLET ORAL
Status: DISCONTINUED | OUTPATIENT
Start: 2023-11-09 | End: 2023-11-13

## 2023-11-09 RX ORDER — FLUOXETINE HYDROCHLORIDE 20 MG/1
40 CAPSULE ORAL DAILY
Status: DISCONTINUED | OUTPATIENT
Start: 2023-11-10 | End: 2023-11-17

## 2023-11-09 RX ORDER — FLUOXETINE 10 MG/1
10 CAPSULE ORAL ONCE
Status: COMPLETED | OUTPATIENT
Start: 2023-11-09 | End: 2023-11-09

## 2023-11-09 RX ADMIN — FLUOXETINE 20 MG: 20 CAPSULE ORAL at 09:06

## 2023-11-09 RX ADMIN — HYDROXYZINE PAMOATE 50 MG: 50 CAPSULE ORAL at 09:17

## 2023-11-09 RX ADMIN — HALOPERIDOL 5 MG: 5 TABLET ORAL at 09:09

## 2023-11-09 RX ADMIN — OLANZAPINE 30 MG: 10 TABLET, FILM COATED ORAL at 20:17

## 2023-11-09 RX ADMIN — HALOPERIDOL 5 MG: 5 TABLET ORAL at 20:17

## 2023-11-09 RX ADMIN — LORAZEPAM 1 MG: 1 TABLET ORAL at 20:17

## 2023-11-09 RX ADMIN — LORAZEPAM 1 MG: 1 TABLET ORAL at 15:58

## 2023-11-09 RX ADMIN — NICOTINE POLACRILEX 2 MG: 2 GUM, CHEWING ORAL at 15:58

## 2023-11-09 RX ADMIN — FLUOXETINE 10 MG: 10 CAPSULE ORAL at 15:58

## 2023-11-09 RX ADMIN — LITHIUM CARBONATE 450 MG: 450 TABLET, EXTENDED RELEASE ORAL at 20:16

## 2023-11-09 RX ADMIN — LORAZEPAM 0.75 MG: 0.5 TABLET ORAL at 09:05

## 2023-11-09 RX ADMIN — PRAZOSIN HYDROCHLORIDE 1 MG: 1 CAPSULE ORAL at 20:17

## 2023-11-09 RX ADMIN — Medication 10 MG: at 20:16

## 2023-11-09 ASSESSMENT — COLUMBIA-SUICIDE SEVERITY RATING SCALE - C-SSRS
6. HAVE YOU EVER DONE ANYTHING, STARTED TO DO ANYTHING, OR PREPARED TO DO ANYTHING TO END YOUR LIFE?: NO
1. SINCE LAST CONTACT, HAVE YOU WISHED YOU WERE DEAD OR WISHED YOU COULD GO TO SLEEP AND NOT WAKE UP?: NO
5. HAVE YOU STARTED TO WORK OUT OR WORKED OUT THE DETAILS OF HOW TO KILL YOURSELF? DO YOU INTEND TO CARRY OUT THIS PLAN?: NO
2. HAVE YOU ACTUALLY HAD ANY THOUGHTS OF KILLING YOURSELF?: NO

## 2023-11-09 ASSESSMENT — PAIN - FUNCTIONAL ASSESSMENT
PAIN_FUNCTIONAL_ASSESSMENT: 0-10
PAIN_FUNCTIONAL_ASSESSMENT: 0-10

## 2023-11-09 ASSESSMENT — PAIN SCALES - GENERAL
PAINLEVEL_OUTOF10: 0 - NO PAIN
PAINLEVEL_OUTOF10: 0 - NO PAIN
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
## 2023-11-09 NOTE — NURSING NOTE
Pt was in bed at start of shift and remained in bed most of the day with the exception of meals however did come out 20-30 minutes prior to meal arriving for lunch and dinner and sat in the front dining area watching television.  Pt remains on 1:1 for suicide precautions.  Pt did not reply to AM assessment questions and kept his eyes closed.  Pt needed nudging to open his eyes in order to take his morning meds, however remained med compliant throughout the day.

## 2023-11-09 NOTE — NURSING NOTE
"Pt is in his room lying in his bed with a 1:1 sitter with arms reach for high [place]. Pt is minimally engaged in answering assessment questions and makes minimal eye contact. Pt rated anxiety  7 \"not that bad\", rated depression 9, denied si/hi, denied a/v hallucinations. Pt is medication compliant.  "

## 2023-11-09 NOTE — GROUP NOTE
Group Topic: Social Skills   Group Date: 11/9/2023  Start Time: 1000  End Time: 1020  Facilitators: JEOVANNY RobinsS   Department: [facility] REHAB THERAPY VIRTUAL    Number of Participants: 3   Group Focus: Leisure skills, social skills  Treatment Modality: Other:   Recreational Therapy  Interventions utilized were leisure development and mental fitness  Purpose: communication skills and other:   increase socialization, increase attention span, increase stimulation     Name: Alexander Zavala YOB: 1985   MR: [id number]      Facilitator: Recreational Therapist  Level of Participation: did not attend  Quality of Participation:  did not attend   Interactions with others:  did not attend   Mood/Affect:  did not attend   Triggers (if applicable): n/a  Cognition:  did not attend   Progress: None  Comments: pt continues to decline groups despite encouragement   Plan: continue with services

## 2023-11-09 NOTE — CARE PLAN
The patient's goals for the shift include pt would not state goal    The clinical goals for the shift include to be free from injury by the end of shift      Problem: Nutrition  Goal: Less than 5 days NPO/clear liquids  Outcome: Progressing  Goal: Oral intake greater than 50%  Outcome: Progressing  Goal: Oral intake greater 75%  Outcome: Progressing  Goal: Consume prescribed supplement  Outcome: Progressing  Goal: Adequate PO fluid intake  Outcome: Progressing  Goal: Nutrition support goals are met within 48 hrs  Outcome: Progressing  Goal: Nutrition support is meeting 75% of nutrient needs  Outcome: Progressing  Goal: Tube feed tolerance  Outcome: Progressing  Goal: BG  mg/dL  Outcome: Progressing  Goal: Lab values WNL  Outcome: Progressing  Goal: Electrolytes WNL  Outcome: Progressing  Goal: Promote healing  Outcome: Progressing  Goal: Maintain stable weight  Outcome: Progressing  Goal: Reduce weight from edema/fluid  Outcome: Progressing  Goal: Gradual weight gain  Outcome: Progressing  Goal: Improve ostomy output  Outcome: Progressing     Problem: Fall/Injury  Goal: Be free from injury by end of the shift  Outcome: Progressing     Problem: Risk for Suicide  Goal: Accepts medications as prescribed/needed this shift  Outcome: Progressing  Goal: Identifies supports this shift  Outcome: Progressing  Goal: Makes needs known through verbalization or behaviors this shift  Outcome: Progressing  Goal: No self harm this shift  Outcome: Progressing  Goal: Read Safety Guidelines this shift  Outcome: Progressing  Goal: Complete Mental Health Safety Plan (psychiatry only) this shift  Outcome: Progressing     Problem: Sensory Perceptual Alteration as Evidenced by  Goal: Able to discuss content of hallucinations/delusions  Outcome: Progressing  Goal: Verbalizes reduction in hallucinations/delusions  Outcome: Progressing  Goal: Will not act on psychotic perception  Outcome: Progressing

## 2023-11-09 NOTE — CARE PLAN
"The patient's goals for the shift include \"to get better and not die\"    The clinical goals for the shift include \"to get better and not die\"    Over the shift, the patient did not make progress toward the following goals. Barriers to progression include severity of depression/anxiety. Recommendations to address these barriers include encouragement and support.    "
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
## 2023-11-10 PROCEDURE — 2500000004 HC RX 250 GENERAL PHARMACY W/ HCPCS (ALT 636 FOR OP/ED): Performed by: NURSE PRACTITIONER

## 2023-11-10 PROCEDURE — 99233 SBSQ HOSP IP/OBS HIGH 50: CPT | Performed by: NURSE PRACTITIONER

## 2023-11-10 PROCEDURE — 2500000001 HC RX 250 WO HCPCS SELF ADMINISTERED DRUGS (ALT 637 FOR MEDICARE OP): Performed by: PSYCHIATRY & NEUROLOGY

## 2023-11-10 PROCEDURE — 2500000001 HC RX 250 WO HCPCS SELF ADMINISTERED DRUGS (ALT 637 FOR MEDICARE OP): Performed by: NURSE PRACTITIONER

## 2023-11-10 PROCEDURE — 1240000001 HC SEMI-PRIVATE BH ROOM DAILY

## 2023-11-10 RX ADMIN — Medication 10 MG: at 20:24

## 2023-11-10 RX ADMIN — OLANZAPINE 30 MG: 10 TABLET, FILM COATED ORAL at 20:25

## 2023-11-10 RX ADMIN — HALOPERIDOL 5 MG: 5 TABLET ORAL at 11:06

## 2023-11-10 RX ADMIN — LITHIUM CARBONATE 450 MG: 450 TABLET, EXTENDED RELEASE ORAL at 20:25

## 2023-11-10 RX ADMIN — NICOTINE POLACRILEX 2 MG: 2 GUM, CHEWING ORAL at 15:14

## 2023-11-10 RX ADMIN — NICOTINE POLACRILEX 2 MG: 2 GUM, CHEWING ORAL at 17:58

## 2023-11-10 RX ADMIN — LORAZEPAM 1 MG: 1 TABLET ORAL at 20:24

## 2023-11-10 RX ADMIN — ERGOCALCIFEROL 1250 MCG: 1.25 CAPSULE ORAL at 11:05

## 2023-11-10 RX ADMIN — LORAZEPAM 1 MG: 1 TABLET ORAL at 14:56

## 2023-11-10 RX ADMIN — LORAZEPAM 1 MG: 1 TABLET ORAL at 11:05

## 2023-11-10 RX ADMIN — FLUOXETINE 40 MG: 20 CAPSULE ORAL at 11:04

## 2023-11-10 RX ADMIN — HALOPERIDOL 5 MG: 5 TABLET ORAL at 20:25

## 2023-11-10 RX ADMIN — PRAZOSIN HYDROCHLORIDE 1 MG: 1 CAPSULE ORAL at 11:04

## 2023-11-10 RX ADMIN — PRAZOSIN HYDROCHLORIDE 1 MG: 1 CAPSULE ORAL at 20:24

## 2023-11-10 ASSESSMENT — PAIN SCALES - GENERAL
PAINLEVEL_OUTOF10: 0 - NO PAIN
PAINLEVEL_OUTOF10: 0 - NO PAIN

## 2023-11-10 ASSESSMENT — COLUMBIA-SUICIDE SEVERITY RATING SCALE - C-SSRS
6. HAVE YOU EVER DONE ANYTHING, STARTED TO DO ANYTHING, OR PREPARED TO DO ANYTHING TO END YOUR LIFE?: NO
2. HAVE YOU ACTUALLY HAD ANY THOUGHTS OF KILLING YOURSELF?: NO
1. SINCE LAST CONTACT, HAVE YOU WISHED YOU WERE DEAD OR WISHED YOU COULD GO TO SLEEP AND NOT WAKE UP?: NO
5. HAVE YOU STARTED TO WORK OUT OR WORKED OUT THE DETAILS OF HOW TO KILL YOURSELF? DO YOU INTEND TO CARRY OUT THIS PLAN?: NO

## 2023-11-10 ASSESSMENT — PAIN - FUNCTIONAL ASSESSMENT
PAIN_FUNCTIONAL_ASSESSMENT: 0-10
PAIN_FUNCTIONAL_ASSESSMENT: 0-10

## 2023-11-10 NOTE — CARE PLAN
"The patient's goals for the shift include \"get some sleep\"    The clinical goals for the shift include Take medications    Over the shift, the patient did not make progress toward the following goals. Barriers to progression include more information on medications. Recommendations to address these barriers include  more education on medications.    "
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
## 2023-11-10 NOTE — NURSING NOTE
"Pt was in bed most of the day he was up on unit for meals and snacks and he got into the shower and showered today. Pt was more responsive today then he has been , he answered certain questions and acknowledged my presence. Anxiety \"yes\" depression \"Yes\" Positive for SI \"but not as much\" Pt denied HI and A/V/H. Safety maintained. Med compliant. Pt contracted for safety with this staff at this time.      "

## 2023-11-10 NOTE — NURSING NOTE
"Pt interview in the patients room  Pt has a sitter at the bedside  Pt cannot contract for safety  Pt stated his goal get some sleep\"  his strength  \" I dont know  I like art\"  and his coping skill  \" I walk away\"  He rated his anxiety 5/10  depression 4/10  and he says he sees shadows  Pt is appropriate with his answers to the questions at this time   "

## 2023-11-10 NOTE — CARE PLAN
The patient's goals for the shift include pt did not answer    The clinical goals for the shift include treatment compliant    Over the shift, the patient did not make progress toward the following goals. Barriers to progression include acuteness of illness. Recommendations to address these barriers include Positive reinforcement .    Problem: Nutrition  Goal: Less than 5 days NPO/clear liquids  Outcome: Progressing  Goal: Oral intake greater than 50%  Outcome: Progressing  Goal: Oral intake greater 75%  Outcome: Progressing  Goal: Consume prescribed supplement  Outcome: Progressing  Goal: Adequate PO fluid intake  Outcome: Progressing  Goal: Nutrition support goals are met within 48 hrs  Outcome: Progressing  Goal: Nutrition support is meeting 75% of nutrient needs  Outcome: Progressing  Goal: Tube feed tolerance  Outcome: Progressing  Goal: BG  mg/dL  Outcome: Progressing  Goal: Lab values WNL  Outcome: Progressing  Goal: Electrolytes WNL  Outcome: Progressing  Goal: Promote healing  Outcome: Progressing  Goal: Maintain stable weight  Outcome: Progressing  Goal: Reduce weight from edema/fluid  Outcome: Progressing  Goal: Gradual weight gain  Outcome: Progressing  Goal: Improve ostomy output  Outcome: Progressing     Problem: Fall/Injury  Goal: Be free from injury by end of the shift  Outcome: Progressing     Problem: Risk for Suicide  Goal: Accepts medications as prescribed/needed this shift  Outcome: Progressing  Goal: Identifies supports this shift  Outcome: Progressing  Goal: Makes needs known through verbalization or behaviors this shift  Outcome: Progressing  Goal: No self harm this shift  Outcome: Progressing  Goal: Read Safety Guidelines this shift  Outcome: Progressing  Goal: Complete Mental Health Safety Plan (psychiatry only) this shift  Outcome: Progressing     Problem: Sensory Perceptual Alteration as Evidenced by  Goal: Able to discuss content of hallucinations/delusions  Outcome:  Progressing  Goal: Verbalizes reduction in hallucinations/delusions  Outcome: Progressing  Goal: Will not act on psychotic perception  Outcome: Progressing

## 2023-11-10 NOTE — NURSING NOTE
Pt had an uneventful night  Pt slept all night long  Pt has a sitter at the bedside since he cannot contract for safety while here in the [facility]

## 2023-11-11 PROCEDURE — 2500000001 HC RX 250 WO HCPCS SELF ADMINISTERED DRUGS (ALT 637 FOR MEDICARE OP): Performed by: PSYCHIATRY & NEUROLOGY

## 2023-11-11 PROCEDURE — 2500000004 HC RX 250 GENERAL PHARMACY W/ HCPCS (ALT 636 FOR OP/ED): Performed by: NURSE PRACTITIONER

## 2023-11-11 PROCEDURE — 99232 SBSQ HOSP IP/OBS MODERATE 35: CPT | Performed by: PSYCHIATRY & NEUROLOGY

## 2023-11-11 PROCEDURE — 1240000001 HC SEMI-PRIVATE BH ROOM DAILY

## 2023-11-11 PROCEDURE — 2500000001 HC RX 250 WO HCPCS SELF ADMINISTERED DRUGS (ALT 637 FOR MEDICARE OP): Performed by: NURSE PRACTITIONER

## 2023-11-11 RX ADMIN — HALOPERIDOL 5 MG: 5 TABLET ORAL at 09:00

## 2023-11-11 RX ADMIN — NICOTINE POLACRILEX 2 MG: 2 GUM, CHEWING ORAL at 17:44

## 2023-11-11 RX ADMIN — LITHIUM CARBONATE 450 MG: 450 TABLET, EXTENDED RELEASE ORAL at 20:24

## 2023-11-11 RX ADMIN — LORAZEPAM 1 MG: 1 TABLET ORAL at 20:23

## 2023-11-11 RX ADMIN — LORAZEPAM 1 MG: 1 TABLET ORAL at 15:01

## 2023-11-11 RX ADMIN — NICOTINE POLACRILEX 2 MG: 2 GUM, CHEWING ORAL at 20:23

## 2023-11-11 RX ADMIN — OLANZAPINE 30 MG: 10 TABLET, FILM COATED ORAL at 20:24

## 2023-11-11 RX ADMIN — FLUOXETINE 40 MG: 20 CAPSULE ORAL at 09:00

## 2023-11-11 RX ADMIN — PRAZOSIN HYDROCHLORIDE 1 MG: 1 CAPSULE ORAL at 20:24

## 2023-11-11 RX ADMIN — HALOPERIDOL 5 MG: 5 TABLET ORAL at 20:24

## 2023-11-11 RX ADMIN — LORAZEPAM 1 MG: 1 TABLET ORAL at 09:00

## 2023-11-11 RX ADMIN — NICOTINE POLACRILEX 2 MG: 2 GUM, CHEWING ORAL at 09:11

## 2023-11-11 RX ADMIN — NICOTINE POLACRILEX 2 MG: 2 GUM, CHEWING ORAL at 13:10

## 2023-11-11 RX ADMIN — Medication 10 MG: at 20:24

## 2023-11-11 RX ADMIN — PRAZOSIN HYDROCHLORIDE 1 MG: 1 CAPSULE ORAL at 09:00

## 2023-11-11 ASSESSMENT — PAIN - FUNCTIONAL ASSESSMENT
PAIN_FUNCTIONAL_ASSESSMENT: 0-10

## 2023-11-11 ASSESSMENT — PAIN SCALES - GENERAL
PAINLEVEL_OUTOF10: 0 - NO PAIN

## 2023-11-11 NOTE — NURSING NOTE
Pt has been in bed most of the day , he has been out for meals and some snacks. Pt had very poor eye contact. Asked pt if he could contract for safety pt did not say a word and stared off. Anxiety 6/10 depression 8/10. Pt denied SI/HI and A/V/H. Safety maintained. Med compliant. Pt contracted for safety with this staff at this time.
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
## 2023-11-11 NOTE — CARE PLAN
"The patient's goals for the shift include \"Get an apartment and a car\"    The clinical goals for the shift include Medication compliance      Barriers to progression include acuity of illness.     Recommendations to address these barriers include continue medication compliance.      The patient was able to sleep well through the night.  No PRN medications were administered.  No changes from previous assessment.    "

## 2023-11-11 NOTE — NURSING NOTE
Patient assessed while in his room walking.  Engages easily in conversation, states he is feeling better.  Denies any anxiety, states his depression is low, denies any suicidal ideations and hallucinations.  States a coping skill of playing instruments and listening to music, his strength is his wit, and his goal is to get an apartment and a car.  Patient showered earlier in the day, was medication compliant, and came out for snack.  No PRN medications were administered.  Will continue to monitor.

## 2023-11-11 NOTE — CARE PLAN
"The patient's goals for the shift include \"I dont have a goal today\"    The clinical goals for the shift include Treatment compliant    Over the shift, the patient did not make progress toward the following goals. Barriers to progression include acuteness of illness. Recommendations to address these barriers include positive reinforcement .    Problem: Nutrition  Goal: Oral intake greater than 50%  Outcome: Progressing  Goal: Oral intake greater 75%  Outcome: Progressing  Goal: Adequate PO fluid intake  Outcome: Progressing  Goal: Nutrition support goals are met within 48 hrs  Outcome: Progressing  Goal: Nutrition support is meeting 75% of nutrient needs  Outcome: Progressing  Goal: BG  mg/dL  Outcome: Progressing  Goal: Lab values WNL  Outcome: Progressing  Goal: Electrolytes WNL  Outcome: Progressing  Goal: Promote healing  Outcome: Progressing  Goal: Maintain stable weight  Outcome: Progressing  Goal: Gradual weight gain  Outcome: Progressing     Problem: Fall/Injury  Goal: Be free from injury by end of the shift  Outcome: Progressing     Problem: Risk for Suicide  Goal: Accepts medications as prescribed/needed this shift  Outcome: Progressing  Goal: Identifies supports this shift  Outcome: Progressing  Goal: Makes needs known through verbalization or behaviors this shift  Outcome: Progressing  Goal: No self harm this shift  Outcome: Progressing  Goal: Read Safety Guidelines this shift  Outcome: Progressing  Goal: Complete Mental Health Safety Plan (psychiatry only) this shift  Outcome: Progressing     Problem: Sensory Perceptual Alteration as Evidenced by  Goal: Able to discuss content of hallucinations/delusions  Outcome: Progressing  Goal: Verbalizes reduction in hallucinations/delusions  Outcome: Progressing  Goal: Will not act on psychotic perception  Outcome: Progressing       "

## 2023-11-12 PROCEDURE — 99232 SBSQ HOSP IP/OBS MODERATE 35: CPT | Performed by: PSYCHIATRY & NEUROLOGY

## 2023-11-12 PROCEDURE — 2500000001 HC RX 250 WO HCPCS SELF ADMINISTERED DRUGS (ALT 637 FOR MEDICARE OP): Performed by: NURSE PRACTITIONER

## 2023-11-12 PROCEDURE — 1240000001 HC SEMI-PRIVATE BH ROOM DAILY

## 2023-11-12 PROCEDURE — 2500000001 HC RX 250 WO HCPCS SELF ADMINISTERED DRUGS (ALT 637 FOR MEDICARE OP): Performed by: PSYCHIATRY & NEUROLOGY

## 2023-11-12 PROCEDURE — 2500000004 HC RX 250 GENERAL PHARMACY W/ HCPCS (ALT 636 FOR OP/ED): Performed by: NURSE PRACTITIONER

## 2023-11-12 RX ORDER — MICONAZOLE NITRATE 2 %
4 CREAM (GRAM) TOPICAL EVERY 6 HOURS PRN
Status: DISCONTINUED | OUTPATIENT
Start: 2023-11-12 | End: 2023-11-22 | Stop reason: HOSPADM

## 2023-11-12 RX ADMIN — LORAZEPAM 1 MG: 1 TABLET ORAL at 15:09

## 2023-11-12 RX ADMIN — OLANZAPINE 30 MG: 10 TABLET, FILM COATED ORAL at 20:10

## 2023-11-12 RX ADMIN — LORAZEPAM 1 MG: 1 TABLET ORAL at 20:10

## 2023-11-12 RX ADMIN — NICOTINE POLACRILEX 2 MG: 2 GUM, CHEWING ORAL at 08:37

## 2023-11-12 RX ADMIN — NICOTINE POLACRILEX 2 MG: 2 GUM, CHEWING ORAL at 16:05

## 2023-11-12 RX ADMIN — FLUOXETINE 40 MG: 20 CAPSULE ORAL at 08:38

## 2023-11-12 RX ADMIN — HALOPERIDOL 5 MG: 5 TABLET ORAL at 08:37

## 2023-11-12 RX ADMIN — HALOPERIDOL 5 MG: 5 TABLET ORAL at 20:10

## 2023-11-12 RX ADMIN — PRAZOSIN HYDROCHLORIDE 1 MG: 1 CAPSULE ORAL at 08:38

## 2023-11-12 RX ADMIN — LORAZEPAM 1 MG: 1 TABLET ORAL at 08:37

## 2023-11-12 RX ADMIN — Medication 10 MG: at 20:10

## 2023-11-12 RX ADMIN — PRAZOSIN HYDROCHLORIDE 1 MG: 1 CAPSULE ORAL at 20:10

## 2023-11-12 RX ADMIN — LITHIUM CARBONATE 450 MG: 450 TABLET, EXTENDED RELEASE ORAL at 20:10

## 2023-11-12 RX ADMIN — NICOTINE POLACRILEX 4 MG: 2 GUM, CHEWING ORAL at 20:11

## 2023-11-12 ASSESSMENT — PAIN - FUNCTIONAL ASSESSMENT
PAIN_FUNCTIONAL_ASSESSMENT: 0-10
PAIN_FUNCTIONAL_ASSESSMENT: 0-10

## 2023-11-12 ASSESSMENT — PAIN SCALES - GENERAL
PAINLEVEL_OUTOF10: 0 - NO PAIN
PAINLEVEL_OUTOF10: 0 - NO PAIN

## 2023-11-12 ASSESSMENT — COLUMBIA-SUICIDE SEVERITY RATING SCALE - C-SSRS
5. HAVE YOU STARTED TO WORK OUT OR WORKED OUT THE DETAILS OF HOW TO KILL YOURSELF? DO YOU INTEND TO CARRY OUT THIS PLAN?: NO
2. HAVE YOU ACTUALLY HAD ANY THOUGHTS OF KILLING YOURSELF?: NO
6. HAVE YOU EVER DONE ANYTHING, STARTED TO DO ANYTHING, OR PREPARED TO DO ANYTHING TO END YOUR LIFE?: NO
1. SINCE LAST CONTACT, HAVE YOU WISHED YOU WERE DEAD OR WISHED YOU COULD GO TO SLEEP AND NOT WAKE UP?: NO

## 2023-11-12 NOTE — NURSING NOTE
Pt was in bed again most of the day , he did state that he wanted to take a shower , pt appears to be  a little brighter , he is asking when he will be leaving , he is still avoidant at times. Anxiety 3/10 depression 9/10. Pt denied SI/HI and A/V/H. Safety maintained. Med compliant. Pt contracted for safety with this staff at this time.

## 2023-11-12 NOTE — CARE PLAN
Problem: Fall/Injury  Goal: Be free from injury by end of the shift  Outcome: Progressing     Problem: Risk for Suicide  Goal: Accepts medications as prescribed/needed this shift  Outcome: Progressing  Goal: No self harm this shift  Outcome: Progressing       Over the shift, the patient did make progress toward the following goals.     Pt slept all night. No PRNs needed. Pt currently sleeping in bed without any obvious signs or symptoms of distress. No new orders to carry out at this time. Sitter at bedside maintained.

## 2023-11-12 NOTE — NURSING NOTE
"Pt was interviewed in the dining area after the pt had his snack. Pt was out being social on the unit, watching the football game. Pt was calm cooperative and pleasant. Pt smiled appropriately, and seemed more bright over all. Pt rated anxiety 3/10, depression 6/10 and denied all SI/HI, AVH, and pain at this time. Pt took all nighttime medications without any problems, PRN angie gum given. Pt was unable to give a goal, when asked, pt stated \"I am at a point in my life where I don't know where it is going,\" pt also was unable to provide a coping skill, pt stated \"I don't really have one.\" Pt stated his two strengths were \"I make myself laugh, and I am able to ignore my stuff and move on.\" Pt is currently sleeping in bed without any obvious signs or symptoms of distress. No new orders to carry out at this time. Sitter maintained at bedside.  "

## 2023-11-12 NOTE — CARE PLAN
"The patient's goals for the shift include \"no goal right now\"    The clinical goals for the shift include Treatment compliant    Over the shift, the patient did not make progress toward the following goals. Barriers to progression include acuteness of illness. Recommendations to address these barriers include positive reinforcement .    Problem: Nutrition  Goal: Oral intake greater than 50%  Outcome: Progressing  Goal: Oral intake greater 75%  Outcome: Progressing  Goal: Adequate PO fluid intake  Outcome: Progressing  Goal: Nutrition support goals are met within 48 hrs  Outcome: Progressing  Goal: Nutrition support is meeting 75% of nutrient needs  Outcome: Progressing  Goal: BG  mg/dL  Outcome: Progressing  Goal: Lab values WNL  Outcome: Progressing  Goal: Electrolytes WNL  Outcome: Progressing  Goal: Promote healing  Outcome: Progressing  Goal: Maintain stable weight  Outcome: Progressing  Goal: Gradual weight gain  Outcome: Progressing     Problem: Fall/Injury  Goal: Be free from injury by end of the shift  Outcome: Progressing     Problem: Risk for Suicide  Goal: Accepts medications as prescribed/needed this shift  Outcome: Progressing  Goal: Identifies supports this shift  Outcome: Progressing  Goal: Makes needs known through verbalization or behaviors this shift  Outcome: Progressing  Goal: No self harm this shift  Outcome: Progressing  Goal: Read Safety Guidelines this shift  Outcome: Progressing  Goal: Complete Mental Health Safety Plan (psychiatry only) this shift  Outcome: Progressing     Problem: Sensory Perceptual Alteration as Evidenced by  Goal: Able to discuss content of hallucinations/delusions  Outcome: Progressing  Goal: Verbalizes reduction in hallucinations/delusions  Outcome: Progressing  Goal: Will not act on psychotic perception  Outcome: Progressing       " Island Pedicle Flap-Requiring Vessel Identification Text: The defect edges were debeveled with a #15 scalpel blade.  Given the location of the defect, shape of the defect and the proximity to free margins an island pedicle advancement flap was deemed most appropriate.  Using a sterile surgical marker, an appropriate advancement flap was drawn, based on the axial vessel mentioned above, incorporating the defect, outlining the appropriate donor tissue and placing the expected incisions within the relaxed skin tension lines where possible.    The area thus outlined was incised deep to adipose tissue with a #15 scalpel blade.  The skin margins were undermined to an appropriate distance in all directions around the primary defect and laterally outward around the island pedicle utilizing iris scissors.  There was minimal undermining beneath the pedicle flap.
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
## 2023-11-13 PROCEDURE — 2500000001 HC RX 250 WO HCPCS SELF ADMINISTERED DRUGS (ALT 637 FOR MEDICARE OP): Performed by: PSYCHIATRY & NEUROLOGY

## 2023-11-13 PROCEDURE — 1240000001 HC SEMI-PRIVATE BH ROOM DAILY

## 2023-11-13 PROCEDURE — 2500000001 HC RX 250 WO HCPCS SELF ADMINISTERED DRUGS (ALT 637 FOR MEDICARE OP): Performed by: NURSE PRACTITIONER

## 2023-11-13 PROCEDURE — 99233 SBSQ HOSP IP/OBS HIGH 50: CPT | Performed by: NURSE PRACTITIONER

## 2023-11-13 PROCEDURE — 2500000004 HC RX 250 GENERAL PHARMACY W/ HCPCS (ALT 636 FOR OP/ED): Performed by: NURSE PRACTITIONER

## 2023-11-13 PROCEDURE — 2500000004 HC RX 250 GENERAL PHARMACY W/ HCPCS (ALT 636 FOR OP/ED): Performed by: PSYCHIATRY & NEUROLOGY

## 2023-11-13 RX ORDER — LORAZEPAM 1 MG/1
1 TABLET ORAL EVERY 6 HOURS PRN
Status: DISCONTINUED | OUTPATIENT
Start: 2023-11-13 | End: 2023-11-22 | Stop reason: HOSPADM

## 2023-11-13 RX ORDER — VITAMIN E MIXED 400 UNIT
400 CAPSULE ORAL 2 TIMES DAILY
Status: DISCONTINUED | OUTPATIENT
Start: 2023-11-13 | End: 2023-11-22 | Stop reason: HOSPADM

## 2023-11-13 RX ORDER — LORAZEPAM 0.5 MG/1
0.75 TABLET ORAL
Status: DISCONTINUED | OUTPATIENT
Start: 2023-11-13 | End: 2023-11-14

## 2023-11-13 RX ORDER — LORAZEPAM 2 MG/ML
2 INJECTION INTRAMUSCULAR EVERY 6 HOURS PRN
Status: DISCONTINUED | OUTPATIENT
Start: 2023-11-13 | End: 2023-11-22 | Stop reason: HOSPADM

## 2023-11-13 RX ADMIN — HALOPERIDOL 5 MG: 5 TABLET ORAL at 10:03

## 2023-11-13 RX ADMIN — PRAZOSIN HYDROCHLORIDE 1 MG: 1 CAPSULE ORAL at 20:36

## 2023-11-13 RX ADMIN — Medication 400 UNITS: at 13:33

## 2023-11-13 RX ADMIN — OLANZAPINE 30 MG: 10 TABLET, FILM COATED ORAL at 22:20

## 2023-11-13 RX ADMIN — PRAZOSIN HYDROCHLORIDE 1 MG: 1 CAPSULE ORAL at 12:51

## 2023-11-13 RX ADMIN — HYDROXYZINE PAMOATE 50 MG: 50 CAPSULE ORAL at 18:01

## 2023-11-13 RX ADMIN — Medication 400 UNITS: at 20:35

## 2023-11-13 RX ADMIN — Medication 10 MG: at 20:35

## 2023-11-13 RX ADMIN — LORAZEPAM 1 MG: 1 TABLET ORAL at 14:09

## 2023-11-13 RX ADMIN — NICOTINE POLACRILEX 4 MG: 2 GUM, CHEWING ORAL at 13:31

## 2023-11-13 RX ADMIN — LORAZEPAM 1 MG: 1 TABLET ORAL at 20:36

## 2023-11-13 RX ADMIN — HALOPERIDOL 5 MG: 5 TABLET ORAL at 20:35

## 2023-11-13 RX ADMIN — NICOTINE POLACRILEX 4 MG: 2 GUM, CHEWING ORAL at 18:00

## 2023-11-13 RX ADMIN — LITHIUM CARBONATE 450 MG: 450 TABLET, EXTENDED RELEASE ORAL at 20:36

## 2023-11-13 RX ADMIN — FLUOXETINE 40 MG: 20 CAPSULE ORAL at 10:02

## 2023-11-13 ASSESSMENT — PAIN SCALES - GENERAL
PAINLEVEL_OUTOF10: 0 - NO PAIN
PAINLEVEL_OUTOF10: 0 - NO PAIN

## 2023-11-13 ASSESSMENT — PAIN - FUNCTIONAL ASSESSMENT
PAIN_FUNCTIONAL_ASSESSMENT: 0-10
PAIN_FUNCTIONAL_ASSESSMENT: 0-10

## 2023-11-13 NOTE — CARE PLAN
Problem: Fall/Injury  Goal: Be free from injury by end of the shift  Outcome: Progressing     Problem: Risk for Suicide  Goal: Accepts medications as prescribed/needed this shift  Outcome: Progressing  Goal: Identifies supports this shift  Outcome: Progressing     Problem: Sensory Perceptual Alteration as Evidenced by  Goal: Verbalizes reduction in hallucinations/delusions  Outcome: Progressing      Over the shift, the patient did  make progress toward the following goals.

## 2023-11-13 NOTE — GROUP NOTE
Group Topic: Open Recreation   Group Date: 11/13/2023  Start Time: 1100  End Time: 1200  Facilitators: MANDA Farooq   Department: [facility] REH[facility] THERAP[facility]    Number of Participants: 7   Group Focus: leisure skills and social skills  Treatment Modality: Other: Recreational Therapy  Interventions utilized were leisure development and mental fitness  Purpose: other: increase social stimulation, increase leisure awareness, enjoyment     Name: Alexander Zavala YOB: 1985   MR: [id number]      Facilitator: Recreational Therapist  Level of Participation: did not attend  Quality of Participation:  did not attend   Interactions with others:  did not attend  Mood/Affect:  did not attend   Triggers (if applicable): n/a  Cognition:  did not attend   Progress: None  Comments: PT declined group despite encouragement   Plan: continue with services

## 2023-11-13 NOTE — CARE PLAN
"The patient's goals for the shift include \"Try to keep my head up\"    The clinical goals for the shift include socialization with peers    Over the shift, the patient did not make progress toward the following goals. Barriers to progression include sleeping in after breakfast. Recommendations to address these barriers include encourage pt to stay up after breakfast and attend group therapy.    "

## 2023-11-13 NOTE — CARE PLAN
Stabilizing; Provider titrating down on his Ativan;  Checked [facility] and [facility] Probate court website: no legal guardian assigned to patient medication has not stabilized him yet, but improving;  history of meth abuse and psychosis, multiple past admits; homeless with no social supports;  Met with patient and he had no one he can call or contact in the community, except his brother, but current phone disconnected, patient has no knowledge of a new number. Attempting to get [facility] to coordinate housing while he is inpatient in order to avoid shelter placement; no ADOD yet.   Sw to follow.

## 2023-11-13 NOTE — GROUP NOTE
Group Topic: Social Skills   Group Date: 11/13/2023  Start Time: 1400  End Time: 1440  Facilitators: MANDA Farooq   Department: [facility] RE[facility] THERAPY [facility]IRT[facility]    Number of Participants: 3   Group Focus: concentration  Treatment Modality: Other: Recreation Therapy  Interventions utilized were mental fitness  Purpose: other: increase stimulation, increase socialization, increase focus enjoyment,    Name: Alexander Zavala YOB: 1985   MR: [id number]      Facilitator: Recreational Therapist  Level of Participation: did not attend  Quality of Participation:  did not attend   Interactions with others:  did not attend   Mood/Affect:  did not attend   Triggers (if applicable): n/a  Cognition:  did not attend   Progress: None  Comments: PT continues to decline groups   Plan: continue with services
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
## 2023-11-14 LAB
ANION GAP SERPL CALC-SCNC: 10 MMOL/L (ref 10–20)
BUN SERPL-MCNC: 15 MG/DL (ref 6–23)
CALCIUM SERPL-MCNC: 8.7 MG/DL (ref 8.6–10.3)
CHLORIDE SERPL-SCNC: 106 MMOL/L (ref 98–107)
CO2 SERPL-SCNC: 28 MMOL/L (ref 21–32)
CREAT SERPL-MCNC: 0.7 MG/DL (ref 0.5–1.3)
GFR SERPL CREATININE-BSD FRML MDRD: >90 ML/MIN/1.73M*2
GLUCOSE SERPL-MCNC: 81 MG/DL (ref 74–99)
LITHIUM SERPL-SCNC: 0.36 MMOL/L (ref 0.6–1.2)
POTASSIUM SERPL-SCNC: 4.2 MMOL/L (ref 3.5–5.3)
SODIUM SERPL-SCNC: 140 MMOL/L (ref 136–145)

## 2023-11-14 PROCEDURE — 36415 COLL VENOUS BLD VENIPUNCTURE: CPT | Performed by: NURSE PRACTITIONER

## 2023-11-14 PROCEDURE — 80178 ASSAY OF LITHIUM: CPT | Mod: GEALAB | Performed by: NURSE PRACTITIONER

## 2023-11-14 PROCEDURE — 1240000001 HC SEMI-PRIVATE BH ROOM DAILY

## 2023-11-14 PROCEDURE — 2500000004 HC RX 250 GENERAL PHARMACY W/ HCPCS (ALT 636 FOR OP/ED): Performed by: NURSE PRACTITIONER

## 2023-11-14 PROCEDURE — 36415 COLL VENOUS BLD VENIPUNCTURE: CPT | Mod: GEALAB | Performed by: NURSE PRACTITIONER

## 2023-11-14 PROCEDURE — 99233 SBSQ HOSP IP/OBS HIGH 50: CPT | Performed by: NURSE PRACTITIONER

## 2023-11-14 PROCEDURE — 2500000001 HC RX 250 WO HCPCS SELF ADMINISTERED DRUGS (ALT 637 FOR MEDICARE OP): Performed by: NURSE PRACTITIONER

## 2023-11-14 PROCEDURE — 2500000001 HC RX 250 WO HCPCS SELF ADMINISTERED DRUGS (ALT 637 FOR MEDICARE OP): Performed by: PSYCHIATRY & NEUROLOGY

## 2023-11-14 PROCEDURE — 84132 ASSAY OF SERUM POTASSIUM: CPT | Performed by: NURSE PRACTITIONER

## 2023-11-14 RX ORDER — LORAZEPAM 0.5 MG/1
0.5 TABLET ORAL
Status: DISCONTINUED | OUTPATIENT
Start: 2023-11-15 | End: 2023-11-20

## 2023-11-14 RX ORDER — LORAZEPAM 0.5 MG/1
0.75 TABLET ORAL ONCE
Status: COMPLETED | OUTPATIENT
Start: 2023-11-14 | End: 2023-11-14

## 2023-11-14 RX ORDER — LORAZEPAM 0.5 MG/1
0.75 TABLET ORAL NIGHTLY
Status: DISCONTINUED | OUTPATIENT
Start: 2023-11-14 | End: 2023-11-17

## 2023-11-14 RX ADMIN — FLUOXETINE 40 MG: 20 CAPSULE ORAL at 09:12

## 2023-11-14 RX ADMIN — LORAZEPAM 0.75 MG: 0.5 TABLET ORAL at 20:41

## 2023-11-14 RX ADMIN — NICOTINE POLACRILEX 4 MG: 2 GUM, CHEWING ORAL at 17:58

## 2023-11-14 RX ADMIN — HALOPERIDOL 5 MG: 5 TABLET ORAL at 09:18

## 2023-11-14 RX ADMIN — LORAZEPAM 0.75 MG: 0.5 TABLET ORAL at 17:24

## 2023-11-14 RX ADMIN — OLANZAPINE 30 MG: 10 TABLET, FILM COATED ORAL at 20:39

## 2023-11-14 RX ADMIN — PRAZOSIN HYDROCHLORIDE 1 MG: 1 CAPSULE ORAL at 09:18

## 2023-11-14 RX ADMIN — ACETAMINOPHEN 650 MG: 325 TABLET ORAL at 20:39

## 2023-11-14 RX ADMIN — HALOPERIDOL 5 MG: 5 TABLET ORAL at 20:38

## 2023-11-14 RX ADMIN — PRAZOSIN HYDROCHLORIDE 1 MG: 1 CAPSULE ORAL at 20:38

## 2023-11-14 RX ADMIN — LORAZEPAM 0.75 MG: 0.5 TABLET ORAL at 09:15

## 2023-11-14 RX ADMIN — NICOTINE POLACRILEX 4 MG: 2 GUM, CHEWING ORAL at 12:57

## 2023-11-14 RX ADMIN — Medication 400 UNITS: at 09:19

## 2023-11-14 RX ADMIN — Medication 10 MG: at 20:38

## 2023-11-14 RX ADMIN — LITHIUM CARBONATE 450 MG: 450 TABLET, EXTENDED RELEASE ORAL at 20:38

## 2023-11-14 RX ADMIN — Medication 400 UNITS: at 20:38

## 2023-11-14 ASSESSMENT — PAIN - FUNCTIONAL ASSESSMENT
PAIN_FUNCTIONAL_ASSESSMENT: 0-10

## 2023-11-14 ASSESSMENT — PAIN SCALES - GENERAL
PAINLEVEL_OUTOF10: 0 - NO PAIN
PAINLEVEL_OUTOF10: 0 - NO PAIN
PAINLEVEL_OUTOF10: 2

## 2023-11-14 ASSESSMENT — PAIN DESCRIPTION - ORIENTATION: ORIENTATION: RIGHT

## 2023-11-14 ASSESSMENT — PAIN DESCRIPTION - LOCATION: LOCATION: FOOT

## 2023-11-14 NOTE — NURSING NOTE
"Upon assessment pt is in room lying in bed. Pt rates anxiety 5/10 and depression 6/10. Pt denied pain, SI/HI and hallucinations. Pt reports strength as \"the ability to forget what's bothering me\". Pt reports goal as \"to find an apartment and a car\" and coping skills as music and art. Pt seems much more talkative and bright. In the past, pt was much more withdrawn. Pt took all nighttime medications. No concerns at this time.  "

## 2023-11-14 NOTE — GROUP NOTE
Group Topic: Music Therapy   Group Date: 11/14/2023  Start Time: 1100  End Time: 1150  Facilitators: Fortunato Lynn   Department: [facility]IRT[facility]    Number of Participants: 6   Group Focus: music therapy  Treatment Modality: Music Therapy  Interventions Utilized were: active music engagement, empathic listening/validating emotions, other [place] analysis, sharing/discussion, songwriting/composition, and support      Name: Alexander Zavala YOB: 1985   MR: [id number]      Level of Participation: did not attend  Quality of Participation:  NA  Interactions with others:  NA  Mood/Affect:  NA  Cognition, Pre Treatment:  NA  Cognition, Post Treatment:  NA  Progress: Other  Plan: continue with services

## 2023-11-14 NOTE — CARE PLAN
"The patient's goals for the shift include \"to get an apartment and car\"    The clinical goals for the shift include medication compliance    Over the shift, the patient did not make progress toward the following goals. Barriers to progression include motivation. Recommendations to address these barriers include encouragement.    "

## 2023-11-14 NOTE — GROUP NOTE
Group Topic: Coping Skills   Group Date: 11/14/2023  Start Time: 1420  End Time: 1500  Facilitators: MANDA Farooq   Department: [facility] THERAP[facility]IRT[facility]    Number of Participants: 5   Group Focus: coping skills, feeling awareness/expression, and self-esteem  Treatment Modality: Other: Recreation Therapy/Nursing Students   Interventions utilized were exploration and patient education  Purpose: coping skills, feelings, self-worth, and self-care    Name: Alexander Zavala YOB: 1985   MR: [id number]      Facilitator: Recreational Therapist  Level of Participation: did not attend  Quality of Participation:  did not attend   Interactions with others:  did not attend   Mood/Affect:  did not attend   Triggers (if applicable): n/a  Cognition:  did not attend   Progress: None  Comments: PT declined despite encouragement   Plan: continue with services
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
## 2023-11-14 NOTE — CARE PLAN
[facility] sent over application to apartment in [place] that will accept him on social security; gave him the application to complete and will send over and follow up for placement there, if possible; Googled brother's [facility] and [facility]; found phone number for [facility],  gave to patient and told him to call; no ADOD yet. Sw to follow.

## 2023-11-14 NOTE — CARE PLAN
"The patient's goals for the shift include \"to get an apartment and car\"    The clinical goals for the shift include medication compliance    Over the shift, the patient made progress towards care plan goals. Pt rested quietly through the night. No PRNs needed, no changes from previous assessment.    "

## 2023-11-15 PROCEDURE — 1240000001 HC SEMI-PRIVATE BH ROOM DAILY

## 2023-11-15 PROCEDURE — 99233 SBSQ HOSP IP/OBS HIGH 50: CPT | Performed by: NURSE PRACTITIONER

## 2023-11-15 PROCEDURE — 2500000001 HC RX 250 WO HCPCS SELF ADMINISTERED DRUGS (ALT 637 FOR MEDICARE OP): Performed by: PSYCHIATRY & NEUROLOGY

## 2023-11-15 PROCEDURE — 2500000001 HC RX 250 WO HCPCS SELF ADMINISTERED DRUGS (ALT 637 FOR MEDICARE OP): Performed by: NURSE PRACTITIONER

## 2023-11-15 PROCEDURE — 2500000004 HC RX 250 GENERAL PHARMACY W/ HCPCS (ALT 636 FOR OP/ED): Performed by: NURSE PRACTITIONER

## 2023-11-15 RX ORDER — LITHIUM CARBONATE 300 MG/1
300 TABLET, FILM COATED, EXTENDED RELEASE ORAL DAILY
Status: DISCONTINUED | OUTPATIENT
Start: 2023-11-15 | End: 2023-11-22 | Stop reason: HOSPADM

## 2023-11-15 RX ADMIN — NICOTINE POLACRILEX 4 MG: 2 GUM, CHEWING ORAL at 17:37

## 2023-11-15 RX ADMIN — HALOPERIDOL 5 MG: 5 TABLET ORAL at 20:36

## 2023-11-15 RX ADMIN — ACETAMINOPHEN 650 MG: 325 TABLET ORAL at 08:48

## 2023-11-15 RX ADMIN — NICOTINE POLACRILEX 4 MG: 2 GUM, CHEWING ORAL at 13:11

## 2023-11-15 RX ADMIN — LITHIUM CARBONATE 300 MG: 300 TABLET, EXTENDED RELEASE ORAL at 13:29

## 2023-11-15 RX ADMIN — HALOPERIDOL 5 MG: 5 TABLET ORAL at 08:47

## 2023-11-15 RX ADMIN — PRAZOSIN HYDROCHLORIDE 1 MG: 1 CAPSULE ORAL at 20:36

## 2023-11-15 RX ADMIN — PRAZOSIN HYDROCHLORIDE 1 MG: 1 CAPSULE ORAL at 08:47

## 2023-11-15 RX ADMIN — LORAZEPAM 0.5 MG: 0.5 TABLET ORAL at 15:31

## 2023-11-15 RX ADMIN — LITHIUM CARBONATE 450 MG: 450 TABLET, EXTENDED RELEASE ORAL at 20:36

## 2023-11-15 RX ADMIN — Medication 10 MG: at 20:37

## 2023-11-15 RX ADMIN — LORAZEPAM 0.75 MG: 0.5 TABLET ORAL at 20:36

## 2023-11-15 RX ADMIN — Medication 400 UNITS: at 20:37

## 2023-11-15 RX ADMIN — OLANZAPINE 30 MG: 10 TABLET, FILM COATED ORAL at 20:36

## 2023-11-15 RX ADMIN — Medication 400 UNITS: at 08:46

## 2023-11-15 RX ADMIN — LORAZEPAM 0.5 MG: 0.5 TABLET ORAL at 08:46

## 2023-11-15 RX ADMIN — FLUOXETINE 40 MG: 20 CAPSULE ORAL at 08:47

## 2023-11-15 ASSESSMENT — PAIN SCALES - GENERAL
PAINLEVEL_OUTOF10: 7
PAINLEVEL_OUTOF10: 0 - NO PAIN

## 2023-11-15 ASSESSMENT — PAIN - FUNCTIONAL ASSESSMENT
PAIN_FUNCTIONAL_ASSESSMENT: 0-10
PAIN_FUNCTIONAL_ASSESSMENT: 0-10

## 2023-11-15 ASSESSMENT — PAIN DESCRIPTION - ORIENTATION: ORIENTATION: RIGHT

## 2023-11-15 NOTE — NURSING NOTE
"Pt was assessed in his room this morning and was engaged during assessment. He rates anxiety 4/10 and depression 6/10 with no SI/HI or AVH reported. He presents as flat and withdrawn but much more engaged than previous days. Pt has been medication compliant and has been observed out on the unit for meals today. He received tylenol this morning for complaints of right foot \" cramping\". His goal for today was to \" contact my brother\", he identifies coping skills as \" music\" and then smiled to this nurse and states \" just not country music\". Pt's appetite has been good and he reports last BM on 11/14. Pt has a new order for Lithium 300mg every day.  Social Work spoke with patient today about housing possibilities. Will continue to monitor for safety and encourage group participation.   "

## 2023-11-15 NOTE — NURSING NOTE
"11/14/2023 @2145- Pt was out social on the unit having a snack with his peers. Pt was then interviewed while he was laying in his bed. Pt was easily engaged and friendly. Pt stated he had a mediocre day. Pt rated anxiety 3/10, depression 4/10 both of which he reported to be manageable for him. Pt denied SI/HI, and AVH at this time. Pt did state he had right foot pain 2/10, PRN tylenol given. Pt stated his goal is to \"try to stay in a positive realm and have good vibes.\" Pt stated his coping skill is \"listening to music.\"  Pt's two strengths are \"Witty and forgetful sometime.\" Pt took all nighttime medications without a problem. No additional PRNs needed. Pt is currently sleeping in bed without any obvious signs or symptoms of distress. No new orders to carry out at this time. Q15 minute safety checks maintained.    11/15/2023 @0512- Pt had an uneventful night. Pt slept in bed throughout. No PRNs needed. Pt is currently sleeping in bed without any obvious signs or symptoms of distress. No new orders to carry out at this time. Q15 minute safety checks maintained,   "

## 2023-11-15 NOTE — CARE PLAN
Problem: Fall/Injury  Goal: Be free from injury by end of the shift  Outcome: Progressing     Problem: Risk for Suicide  Goal: No self harm this shift  Outcome: Progressing       Over the shift, the patient did  make progress toward the following goals.
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
## 2023-11-15 NOTE — CARE PLAN
Called back [facility], faxed application;  Ket, in the office, stated they are accepting applications, that they currently have a waiting list of 8 ahead of patient but that could go fast and they will call this sw when they have an open apartment;  She also suggested to apply to [facility] in [place], as they also accept Social Security for rental payments.  Will talk to patient about that, and, if he agrees, will have him complete an application for [place] [facility] to try to get an apartment there, if they have availability and agreed to accept.  Sw to follow.     Addendum: 1:15pm.  Met with patient; he stated he did talk to his brother, Paxton, and stated he does not think he can go to live with him at discharge; He gave this sw verbal permission to call him and will have him sign an MANN;  Told him about [facility] having a waiting list; offered for him to apply to [facility] and he said he does not want to go there;  suggested [facility], which is usually full but if they have an opening, he could go there; he stated he did not want to go there either.  Sw to follow.

## 2023-11-16 PROCEDURE — 2500000004 HC RX 250 GENERAL PHARMACY W/ HCPCS (ALT 636 FOR OP/ED): Performed by: NURSE PRACTITIONER

## 2023-11-16 PROCEDURE — 2500000001 HC RX 250 WO HCPCS SELF ADMINISTERED DRUGS (ALT 637 FOR MEDICARE OP): Performed by: NURSE PRACTITIONER

## 2023-11-16 PROCEDURE — 2500000001 HC RX 250 WO HCPCS SELF ADMINISTERED DRUGS (ALT 637 FOR MEDICARE OP): Performed by: PSYCHIATRY & NEUROLOGY

## 2023-11-16 PROCEDURE — 1240000001 HC SEMI-PRIVATE BH ROOM DAILY

## 2023-11-16 PROCEDURE — 99233 SBSQ HOSP IP/OBS HIGH 50: CPT | Performed by: NURSE PRACTITIONER

## 2023-11-16 RX ADMIN — Medication 10 MG: at 20:45

## 2023-11-16 RX ADMIN — LORAZEPAM 0.5 MG: 0.5 TABLET ORAL at 15:25

## 2023-11-16 RX ADMIN — FLUOXETINE 40 MG: 20 CAPSULE ORAL at 08:49

## 2023-11-16 RX ADMIN — NICOTINE POLACRILEX 4 MG: 2 GUM, CHEWING ORAL at 17:33

## 2023-11-16 RX ADMIN — Medication 400 UNITS: at 08:49

## 2023-11-16 RX ADMIN — LORAZEPAM 0.75 MG: 0.5 TABLET ORAL at 20:46

## 2023-11-16 RX ADMIN — LITHIUM CARBONATE 450 MG: 450 TABLET, EXTENDED RELEASE ORAL at 20:45

## 2023-11-16 RX ADMIN — OLANZAPINE 30 MG: 10 TABLET, FILM COATED ORAL at 20:45

## 2023-11-16 RX ADMIN — HALOPERIDOL 5 MG: 5 TABLET ORAL at 20:45

## 2023-11-16 RX ADMIN — Medication 400 UNITS: at 20:45

## 2023-11-16 RX ADMIN — LITHIUM CARBONATE 300 MG: 300 TABLET, EXTENDED RELEASE ORAL at 08:51

## 2023-11-16 RX ADMIN — PRAZOSIN HYDROCHLORIDE 1 MG: 1 CAPSULE ORAL at 20:45

## 2023-11-16 RX ADMIN — PRAZOSIN HYDROCHLORIDE 1 MG: 1 CAPSULE ORAL at 08:50

## 2023-11-16 RX ADMIN — LORAZEPAM 0.5 MG: 0.5 TABLET ORAL at 08:50

## 2023-11-16 RX ADMIN — NICOTINE POLACRILEX 4 MG: 2 GUM, CHEWING ORAL at 12:55

## 2023-11-16 RX ADMIN — HALOPERIDOL 5 MG: 5 TABLET ORAL at 08:50

## 2023-11-16 ASSESSMENT — PAIN SCALES - GENERAL
PAINLEVEL_OUTOF10: 0 - NO PAIN

## 2023-11-16 ASSESSMENT — PAIN - FUNCTIONAL ASSESSMENT
PAIN_FUNCTIONAL_ASSESSMENT: 0-10
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
## 2023-11-16 NOTE — NURSING NOTE
"Pt was assessed this morning in his room, he was engaged and cooperative during assessment. Pt rates anxiety 3/10 and depression 6/10 with no SI/HI or AVH reported. He presents as flat , soft spoken and withdrawn, his eye contact is fair. Pt denies pain or discomfort and has been observed in the dining area for meals. Pt does not socialize  with peers and he sits alone at meal time. Pt was unable to give this nurse a goal or strengths today. Pt states he \" slept OK\" last night. He identified coping skills of \" music\". His appetite has been good and he reports last BM on 11/15. Will continue to monitor for safety and encourage group participation.   "

## 2023-11-16 NOTE — NURSING NOTE
Pt took his medications and watched some tv last night  Pt slept all night long and had an uneventful night

## 2023-11-16 NOTE — CARE PLAN
"The patient was unable to provide  goals for the shift   and when asked if he had goals for this shift, his reply was \"JOSI\".    The clinical goals for the shift include medication compliance    Over the shift, the patient did  make progress toward the following goals, he was medication compliant this shift.   "

## 2023-11-16 NOTE — CARE PLAN
"The patient's goals for the shift include Be at peace and get some sleep\"    The clinical goals for the shift include take my medications    Over the shift, the patient did not make progress toward the following goals. Barriers to progression include understanding his medications . Recommendations to address these barriers include more education on medications.    "

## 2023-11-16 NOTE — GROUP NOTE
Group Topic: Gross Motor/Balance Skills   Group Date: 11/16/2023  Start Time: 1400  End Time: 1515  Facilitators: JEOVANNY FarooqS   Department: [facility]IRT[facility]    Number of Participants: 6   Group Focus: leisure skills and social skills  Treatment Modality: Other: Recreational Therapy  Interventions utilized were leisure development  Purpose: other: social interaction, leisure awareness, increase stimulation    Name: Alexander Zavala YOB: 1985   MR: [id number]      Facilitator: Recreational Therapist  Level of Participation: active  Quality of Participation: cooperative and engaged  Interactions with others: appropriate  Mood/Affect: appropriate  Triggers (if applicable): n/a  Cognition: coherent/clear  Progress: Moderate  Comments: PT arrived late, but was calm, cooperative, and engaged in task. Talked with CTRS about playing on his Xbox at home and that he enjoys playing bass guitar.   Plan: continue with services

## 2023-11-16 NOTE — NURSING NOTE
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
## 2023-11-17 PROCEDURE — 2500000001 HC RX 250 WO HCPCS SELF ADMINISTERED DRUGS (ALT 637 FOR MEDICARE OP): Performed by: PSYCHIATRY & NEUROLOGY

## 2023-11-17 PROCEDURE — 99233 SBSQ HOSP IP/OBS HIGH 50: CPT | Performed by: NURSE PRACTITIONER

## 2023-11-17 PROCEDURE — 2500000001 HC RX 250 WO HCPCS SELF ADMINISTERED DRUGS (ALT 637 FOR MEDICARE OP): Performed by: NURSE PRACTITIONER

## 2023-11-17 PROCEDURE — 1240000001 HC SEMI-PRIVATE BH ROOM DAILY

## 2023-11-17 PROCEDURE — 2500000004 HC RX 250 GENERAL PHARMACY W/ HCPCS (ALT 636 FOR OP/ED): Performed by: NURSE PRACTITIONER

## 2023-11-17 RX ORDER — FLUOXETINE 10 MG/1
10 CAPSULE ORAL ONCE
Status: COMPLETED | OUTPATIENT
Start: 2023-11-17 | End: 2023-11-17

## 2023-11-17 RX ORDER — LORAZEPAM 0.5 MG/1
0.5 TABLET ORAL NIGHTLY
Status: DISCONTINUED | OUTPATIENT
Start: 2023-11-17 | End: 2023-11-21

## 2023-11-17 RX ORDER — FLUOXETINE HYDROCHLORIDE 20 MG/1
60 CAPSULE ORAL DAILY
Status: DISCONTINUED | OUTPATIENT
Start: 2023-11-18 | End: 2023-11-22 | Stop reason: HOSPADM

## 2023-11-17 RX ADMIN — HALOPERIDOL 5 MG: 5 TABLET ORAL at 08:32

## 2023-11-17 RX ADMIN — PRAZOSIN HYDROCHLORIDE 1 MG: 1 CAPSULE ORAL at 20:19

## 2023-11-17 RX ADMIN — HALOPERIDOL 5 MG: 5 TABLET ORAL at 20:20

## 2023-11-17 RX ADMIN — ERGOCALCIFEROL 1250 MCG: 1.25 CAPSULE ORAL at 08:33

## 2023-11-17 RX ADMIN — LORAZEPAM 0.5 MG: 0.5 TABLET ORAL at 08:32

## 2023-11-17 RX ADMIN — LORAZEPAM 0.5 MG: 0.5 TABLET ORAL at 15:02

## 2023-11-17 RX ADMIN — NICOTINE POLACRILEX 4 MG: 2 GUM, CHEWING ORAL at 12:32

## 2023-11-17 RX ADMIN — LITHIUM CARBONATE 300 MG: 300 TABLET, EXTENDED RELEASE ORAL at 08:32

## 2023-11-17 RX ADMIN — Medication 400 UNITS: at 20:20

## 2023-11-17 RX ADMIN — Medication 400 UNITS: at 08:32

## 2023-11-17 RX ADMIN — Medication 10 MG: at 20:19

## 2023-11-17 RX ADMIN — LITHIUM CARBONATE 450 MG: 450 TABLET, EXTENDED RELEASE ORAL at 20:19

## 2023-11-17 RX ADMIN — FLUOXETINE 40 MG: 20 CAPSULE ORAL at 08:32

## 2023-11-17 RX ADMIN — LORAZEPAM 0.5 MG: 0.5 TABLET ORAL at 20:20

## 2023-11-17 RX ADMIN — NICOTINE POLACRILEX 4 MG: 2 GUM, CHEWING ORAL at 17:37

## 2023-11-17 RX ADMIN — PRAZOSIN HYDROCHLORIDE 1 MG: 1 CAPSULE ORAL at 08:32

## 2023-11-17 RX ADMIN — FLUOXETINE 10 MG: 10 CAPSULE ORAL at 15:02

## 2023-11-17 RX ADMIN — OLANZAPINE 30 MG: 10 TABLET, FILM COATED ORAL at 20:19

## 2023-11-17 ASSESSMENT — PAIN SCALES - GENERAL
PAINLEVEL_OUTOF10: 0 - NO PAIN
PAINLEVEL_OUTOF10: 0 - NO PAIN

## 2023-11-17 ASSESSMENT — PAIN - FUNCTIONAL ASSESSMENT
PAIN_FUNCTIONAL_ASSESSMENT: 0-10
PAIN_FUNCTIONAL_ASSESSMENT: 0-10

## 2023-11-17 NOTE — NURSING NOTE
"Pt interview in the front [facility]  Pt is calm and cooperative  Pt is eating his snack and watching TV  Pt stated his goal \" get some sleep\"  his strength \" I compose music\"  and his coping skill \" I walk away\" Pt is more outgoing than in the past   Pt rated his anxiety 3/10  depression 4/10  and denies everything else at this time  Pt is appropriate with his answers to the questions at this time  "

## 2023-11-17 NOTE — NURSING NOTE
"Patient out on the unit for part of this shift, he was not observed interacting with peers. Rated anxiety 0/10 and depression 4/10. Denied SI/HI. Denied auditory/visual/other hallucinations. No complaints of pain or discomfort during this shift, patient is able to state his needs. Medication compliant. Patient was not observed attending any group therapies this shift. Able to state positive coping skills such as \"I have no idea\". Patient did not provide any strengths during this assessment. Q15 minute checks to be maintained throughout shift for safety.    "

## 2023-11-17 NOTE — CARE PLAN
"The patient's goals for the shift include \"get sleep\"    The clinical goals for the shift include medications compliance    Over the shift, the patient did not make progress toward the following goals. Barriers to progression include medication knowledge. Recommendations to address these barriers include more education on medications.    "

## 2023-11-17 NOTE — CARE PLAN
Made referral to [facility], completed releases and application for emergency housing; no call back or response yet despite repeated calls;  [facility] at [facility] has no other suggestions;  Patient has applied for an apartment, but there is a waiting list; [facility] ([facility] homeless [facility]) is full.  No group homes in [facility];  Will ask CPST at [facility] to apply for group home in other [facility], but the funding will delay placement as it will need approval of the mental health board;  Cannot go to [facility] as he is not a [facility] resident, but will call them and see if they have any openings in their [place]ounty beds.  Ready to go, just no safe place to send him today.     2:54 pm Addendum:  no call back from [facility], called them twice today; left 2 messages today and 1 last night; sw contacted his CPST at [facility] and she stated there is nothing else that she knows of to help him.  [place] has a couple of out of county beds.  Khanh phoned [facility], no available out-of-county beds.  To review with patient asap; khanh to follow.
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
## 2023-11-17 NOTE — CARE PLAN
"Problem: Nutrition  Goal: Adequate PO fluid intake  Outcome: Progressing     Problem: Fall/Injury  Goal: Be free from injury by end of the shift  Outcome: Progressing     Problem: Risk for Suicide  Goal: Makes needs known through verbalization or behaviors this shift  Outcome: Progressing     The patient's goals for the shift include \"none\"    The clinical goals for the shift include medication compliance    Over the shift, the patient did not make progress toward the following goals. Barriers to progression include acuteness of illness. Recommendations to address these barriers include reinforce patient.    "

## 2023-11-18 PROCEDURE — 2500000004 HC RX 250 GENERAL PHARMACY W/ HCPCS (ALT 636 FOR OP/ED): Performed by: NURSE PRACTITIONER

## 2023-11-18 PROCEDURE — 99233 SBSQ HOSP IP/OBS HIGH 50: CPT | Performed by: PSYCHIATRY & NEUROLOGY

## 2023-11-18 PROCEDURE — 1240000001 HC SEMI-PRIVATE BH ROOM DAILY

## 2023-11-18 PROCEDURE — 2500000001 HC RX 250 WO HCPCS SELF ADMINISTERED DRUGS (ALT 637 FOR MEDICARE OP): Performed by: PSYCHIATRY & NEUROLOGY

## 2023-11-18 PROCEDURE — 2500000001 HC RX 250 WO HCPCS SELF ADMINISTERED DRUGS (ALT 637 FOR MEDICARE OP): Performed by: NURSE PRACTITIONER

## 2023-11-18 RX ADMIN — Medication 10 MG: at 20:39

## 2023-11-18 RX ADMIN — FLUOXETINE 60 MG: 20 CAPSULE ORAL at 09:35

## 2023-11-18 RX ADMIN — PRAZOSIN HYDROCHLORIDE 1 MG: 1 CAPSULE ORAL at 09:36

## 2023-11-18 RX ADMIN — OLANZAPINE 30 MG: 10 TABLET, FILM COATED ORAL at 20:38

## 2023-11-18 RX ADMIN — Medication 400 UNITS: at 20:38

## 2023-11-18 RX ADMIN — LITHIUM CARBONATE 300 MG: 300 TABLET, EXTENDED RELEASE ORAL at 09:34

## 2023-11-18 RX ADMIN — HALOPERIDOL 5 MG: 5 TABLET ORAL at 09:36

## 2023-11-18 RX ADMIN — NICOTINE POLACRILEX 4 MG: 2 GUM, CHEWING ORAL at 12:37

## 2023-11-18 RX ADMIN — LITHIUM CARBONATE 450 MG: 450 TABLET, EXTENDED RELEASE ORAL at 20:39

## 2023-11-18 RX ADMIN — NICOTINE POLACRILEX 4 MG: 2 GUM, CHEWING ORAL at 18:17

## 2023-11-18 RX ADMIN — LORAZEPAM 0.5 MG: 0.5 TABLET ORAL at 20:39

## 2023-11-18 RX ADMIN — Medication 400 UNITS: at 09:35

## 2023-11-18 RX ADMIN — LORAZEPAM 0.5 MG: 0.5 TABLET ORAL at 09:35

## 2023-11-18 RX ADMIN — LORAZEPAM 0.5 MG: 0.5 TABLET ORAL at 15:25

## 2023-11-18 RX ADMIN — PRAZOSIN HYDROCHLORIDE 1 MG: 1 CAPSULE ORAL at 20:39

## 2023-11-18 RX ADMIN — HALOPERIDOL 5 MG: 5 TABLET ORAL at 20:39

## 2023-11-18 ASSESSMENT — PAIN SCALES - GENERAL
PAINLEVEL_OUTOF10: 0 - NO PAIN
PAINLEVEL_OUTOF10: 0 - NO PAIN

## 2023-11-18 ASSESSMENT — PAIN - FUNCTIONAL ASSESSMENT: PAIN_FUNCTIONAL_ASSESSMENT: 0-10

## 2023-11-18 NOTE — PROGRESS NOTES
"Alexander Zavala is a 38 y.o. male on day 16 of admission presenting with Psychosis, paranoid (CMS/HCC).      Subjective   The patient was seen and examined. I reviewed the chart and vital signs from overnight. I reviewed previous notes. I reviewed medications, administered overnight and their reported benefits or side effects. Patient reported by nursing to have slept 7.75 broken. Patient denied drug side effects. The patient's nurse this morning reported, overnight, no agitated behavioral disturbances.  The patient reported, he is doing ok, asking about disposition plan. Medication adherent, visible on the unit, more.          Objective     Last Recorded Vitals  Blood pressure 122/82, pulse 94, temperature 37 °C (98.6 °F), resp. rate 18, height 1.838 m (6' 0.36\"), weight 90 kg (198 lb 6.6 oz), SpO2 97 %.    Scheduled medications  ergocalciferol, 1,250 mcg, oral, Weekly  FLUoxetine, 60 mg, oral, Daily  haloperidol, 5 mg, oral, BID  lithium ER, 450 mg, oral, Nightly  lithium ER, 300 mg, oral, Daily  LORazepam, 0.5 mg, oral, BID  LORazepam, 0.5 mg, oral, Nightly  melatonin, 10 mg, oral, Nightly  OLANZapine, 30 mg, oral, Nightly  prazosin, 1 mg, oral, BID  vitamin E, 400 Units, oral, BID      Continuous medications     PRN medications  PRN medications: acetaminophen, alum-mag hydroxide-simeth, diphenhydrAMINE **OR** diphenhydrAMINE, haloperidol **OR** haloperidol lactate, hydrOXYzine pamoate, LORazepam **OR** LORazepam, nicotine polacrilex       Mental Status Exam  General: 39 yo male hx SAD, bipolar, meth abuse daily, polysubstance abuse hx admitted for mixed episode with disorganization, thought disturbance  Appearance: Appears  stated age, dressed in hospital attire, less than fair grooming and hygiene  Attitude: calm, cooperative  Behavior: good eye contact  Movement: no agitation, no retardation. No EPS/TD. Normal gait and station but with mild dyskinesia. Normal muscle tone/bulk..  Speech and language:  some " "organization. Rare spontaneous speech, lower volume, tone  Mood: \"ok\"  Affect:  mildy constricted, tired  Thought process: more organized, linear  Thought content: Denied suicidal ideation, denied homicidal ideation, very mild paranoia, noted to be out in day lounge playing cards.   Perception: denied +AH (multiple voices including nephew) and VH this am    Cognition: alert and oriented x3. Appropriate  Insight: limited, does not understand his illness and need for mental health care on discharge  Judgment: poor, medication non-compliance, suicide attempts, aggression.  Relevant Results      Assessment/Plan   - SCHIZOAFFECTIVE DISORDER, BIPOLAR TYPE  - DEPRESSED, SEVERE WITH PSYCHOSIS, PARANOIA, AH, DISORGANIZED THOUGHT  - 11/5 +SUICIDAL IDEATION, unable to contract for safety  - ISAMAR by hx  - PTSD  - STIMULANT (meth) USE DISORDER, SEVERE, DEP, last use unknown. Utox neg.  - POLYSUBSTANCE ABUSE HX (hx crack cocaine, etoh, thc)  - NICOTINE DEPENDENCE  - VITAMIN D INSUFFICIENCY    11-18-23: continue to monitor.        I spent 28 minutes in the professional and overall care of this patient.      Audelia Jacobsen MD      "

## 2023-11-18 NOTE — NURSING NOTE
"[facility] interview [facility]  Pt is eating his snack and watching TV  Pt stated he had an OK day  He said his goal \" get sleep\"  his strength \"  I compose music\" and his coping skill \" He rated his anxiety 3/10  depression 5/10  and denied everything else at this time  Pt is appropriate with her answers to the questions at this time   "

## 2023-11-18 NOTE — CARE PLAN
"The patient's goals for the shift include \"none\"    The clinical goals for the shift include medication compliance    Over the shift, the patient did not make progress toward the following goals. Barriers to progression include lack of knowledge of medications. Recommendations to address these barriers include more education on medications.    "

## 2023-11-18 NOTE — NURSING NOTE
Pt took his night time medications  He went to bed and has slept all night long  pt had an uneventful night

## 2023-11-19 PROCEDURE — 2500000001 HC RX 250 WO HCPCS SELF ADMINISTERED DRUGS (ALT 637 FOR MEDICARE OP): Performed by: PSYCHIATRY & NEUROLOGY

## 2023-11-19 PROCEDURE — 1240000001 HC SEMI-PRIVATE BH ROOM DAILY

## 2023-11-19 PROCEDURE — 2500000001 HC RX 250 WO HCPCS SELF ADMINISTERED DRUGS (ALT 637 FOR MEDICARE OP): Performed by: NURSE PRACTITIONER

## 2023-11-19 PROCEDURE — 99232 SBSQ HOSP IP/OBS MODERATE 35: CPT | Performed by: PSYCHIATRY & NEUROLOGY

## 2023-11-19 PROCEDURE — 2500000004 HC RX 250 GENERAL PHARMACY W/ HCPCS (ALT 636 FOR OP/ED): Performed by: NURSE PRACTITIONER

## 2023-11-19 RX ADMIN — NICOTINE POLACRILEX 4 MG: 2 GUM, CHEWING ORAL at 12:23

## 2023-11-19 RX ADMIN — LORAZEPAM 0.5 MG: 0.5 TABLET ORAL at 15:09

## 2023-11-19 RX ADMIN — LORAZEPAM 0.5 MG: 0.5 TABLET ORAL at 09:16

## 2023-11-19 RX ADMIN — OLANZAPINE 30 MG: 10 TABLET, FILM COATED ORAL at 20:39

## 2023-11-19 RX ADMIN — LORAZEPAM 0.5 MG: 0.5 TABLET ORAL at 20:40

## 2023-11-19 RX ADMIN — Medication 400 UNITS: at 09:16

## 2023-11-19 RX ADMIN — Medication 10 MG: at 20:40

## 2023-11-19 RX ADMIN — LITHIUM CARBONATE 450 MG: 450 TABLET, EXTENDED RELEASE ORAL at 20:39

## 2023-11-19 RX ADMIN — LITHIUM CARBONATE 300 MG: 300 TABLET, EXTENDED RELEASE ORAL at 09:14

## 2023-11-19 RX ADMIN — Medication 400 UNITS: at 20:39

## 2023-11-19 RX ADMIN — PRAZOSIN HYDROCHLORIDE 1 MG: 1 CAPSULE ORAL at 20:39

## 2023-11-19 RX ADMIN — HALOPERIDOL 5 MG: 5 TABLET ORAL at 20:40

## 2023-11-19 RX ADMIN — HALOPERIDOL 5 MG: 5 TABLET ORAL at 09:14

## 2023-11-19 RX ADMIN — PRAZOSIN HYDROCHLORIDE 1 MG: 1 CAPSULE ORAL at 09:16

## 2023-11-19 RX ADMIN — FLUOXETINE 60 MG: 20 CAPSULE ORAL at 09:14

## 2023-11-19 RX ADMIN — NICOTINE POLACRILEX 4 MG: 2 GUM, CHEWING ORAL at 18:02

## 2023-11-19 ASSESSMENT — PAIN SCALES - GENERAL
PAINLEVEL_OUTOF10: 0 - NO PAIN
PAINLEVEL_OUTOF10: 0 - NO PAIN

## 2023-11-19 ASSESSMENT — PAIN - FUNCTIONAL ASSESSMENT
PAIN_FUNCTIONAL_ASSESSMENT: 0-10
PAIN_FUNCTIONAL_ASSESSMENT: 0-10

## 2023-11-19 NOTE — CARE PLAN
"The patient's goals for this shift include: \"Have the best day\"    The clinical goals for this shift include: Treatment Compliance and Safety  -Patient will participate in ordered treatments and accept the need for medications.  -The patient will demonstrate effective coping when faced with unfavorable situations.  -The patient will verbalize an increased sense of self-worth.  -The patient will maintain reality orientation and communicate and interact with other people according to social norms.    On assessment, the patient is calm, cooperative, and answers questions appropriately. The patient is ambulating in room. Patient rates anxiety 2/10, depression 5/10. Otherwise denies pain, SI/HI, and AH/VH at this time. Patient lists \"rock and roll\" as a coping skill, \"wit\" as a strength, and \"have the best day\" as a goal. Compliant with nighttime medications. PRNs not given. No concerns at this time.     Update: Patient had an uneventful shift. Care provided as ordered without issue. Additional PRN's not given. Slept uninterrupted throughout the night. No concerns at this time. Q15 minute safety checks maintained.     "

## 2023-11-19 NOTE — CARE PLAN
"The patient's goals for the shift include \"none\"    The clinical goals for the shift include medication compliance    Over the shift, the patient did make progress toward goals.   " bs log    meds-  Metformin 624ME 5821NP qd  trulicity 6 40MW/ 0 5 q week

## 2023-11-19 NOTE — PROGRESS NOTES
Alexander Zavala is a 38 y.o. male on day 17 of admission presenting with Psychosis, paranoid (CMS/HCC).      Subjective   The patient was seen and examined. I reviewed the chart and vital signs from overnight. I reviewed previous notes. I reviewed medications, administered overnight and their reported benefits or side effects. Patient reported by nursing to have slept 9.75 broken. Observed playing Abdirashid yesteday with other patients. Appeared calm, relaxed. Stated goal today to call brother. Voiced no complaints, med adherent. No issues overnight.     Objective     Last Recorded Vitals      11/17/2023     6:16 AM 11/17/2023     5:38 PM 11/17/2023     5:39 PM 11/18/2023     6:03 PM 11/18/2023     6:05 PM 11/19/2023     5:36 AM 11/19/2023     5:37 AM   Vitals   Systolic 115 124 122 121 114 118 120   Diastolic 77 80 82 75 69 76 83   Heart Rate 86 88 94 83 90 79 88   Temp  37 °C (98.6 °F)  36.7 °C (98.1 °F)  37.3 °C (99.1 °F)    Resp  18  18           Scheduled medications  ergocalciferol, 1,250 mcg, oral, Weekly  FLUoxetine, 60 mg, oral, Daily  haloperidol, 5 mg, oral, BID  lithium ER, 450 mg, oral, Nightly  lithium ER, 300 mg, oral, Daily  LORazepam, 0.5 mg, oral, BID  LORazepam, 0.5 mg, oral, Nightly  melatonin, 10 mg, oral, Nightly  OLANZapine, 30 mg, oral, Nightly  prazosin, 1 mg, oral, BID  vitamin E, 400 Units, oral, BID      Continuous medications     PRN medications  PRN medications: acetaminophen, alum-mag hydroxide-simeth, diphenhydrAMINE **OR** diphenhydrAMINE, haloperidol **OR** haloperidol lactate, hydrOXYzine pamoate, LORazepam **OR** LORazepam, nicotine polacrilex       Mental Status Exam  General: 37 yo male hx SAD, bipolar, meth abuse daily, polysubstance abuse hx admitted for mixed episode with disorganization, thought disturbance  Appearance: Appears  stated age, dressed in hospital attire, less than fair grooming and hygiene  Attitude: calm, cooperative  Behavior: good eye contact  Movement: no  agitation, no retardation. No EPS/TD. Normal gait and station but with mild dyskinesia. Normal muscle tone/bulk..  Speech and language:  some organization. Rare spontaneous speech, lower volume, tone  Mood: depression 3 and anxiety 0  Affect:  mildy constricted, appears less tired  Thought process: more organized, linear  Thought content: Denied suicidal ideation, denied homicidal ideation, very mild paranoia, noted to be out in day lounge playing cards.   Perception: denied +AH (multiple voices including nephew) and VH this am    Cognition: alert and oriented x3. Appropriate  Insight: limited, does not understand his illness and need for mental health care on discharge  Judgment: poor, medication non-compliance, suicide attempts, aggression.  Relevant Results      Assessment/Plan   - SCHIZOAFFECTIVE DISORDER, BIPOLAR TYPE  - DEPRESSED, SEVERE WITH PSYCHOSIS, PARANOIA, AH, DISORGANIZED THOUGHT  - 11/5 +SUICIDAL IDEATION, unable to contract for safety  - ISAMAR by hx  - PTSD  - STIMULANT (meth) USE DISORDER, SEVERE, DEP, last use unknown. Utox neg.  - POLYSUBSTANCE ABUSE HX (hx crack cocaine, etoh, thc)  - NICOTINE DEPENDENCE  - VITAMIN D INSUFFICIENCY    11-18-23: continue to monitor.  11-19-23: dc planning       I spent 28 minutes in the professional and overall care of this patient.      Audelia Jacobsen MD

## 2023-11-19 NOTE — CARE PLAN
"The patient's goals for the shift include \"Contact my family today.\"    The clinical goals for the shift include treatment compliant    Over the shift, the patient did make progress toward the goals.   "

## 2023-11-20 PROCEDURE — 1240000001 HC SEMI-PRIVATE BH ROOM DAILY

## 2023-11-20 PROCEDURE — 2500000004 HC RX 250 GENERAL PHARMACY W/ HCPCS (ALT 636 FOR OP/ED): Performed by: PSYCHIATRY & NEUROLOGY

## 2023-11-20 PROCEDURE — 99233 SBSQ HOSP IP/OBS HIGH 50: CPT | Performed by: NURSE PRACTITIONER

## 2023-11-20 PROCEDURE — 2500000004 HC RX 250 GENERAL PHARMACY W/ HCPCS (ALT 636 FOR OP/ED): Performed by: NURSE PRACTITIONER

## 2023-11-20 PROCEDURE — 2500000001 HC RX 250 WO HCPCS SELF ADMINISTERED DRUGS (ALT 637 FOR MEDICARE OP): Performed by: PSYCHIATRY & NEUROLOGY

## 2023-11-20 PROCEDURE — 2500000001 HC RX 250 WO HCPCS SELF ADMINISTERED DRUGS (ALT 637 FOR MEDICARE OP): Performed by: NURSE PRACTITIONER

## 2023-11-20 RX ORDER — LORAZEPAM 0.5 MG/1
0.25 TABLET ORAL
Status: DISCONTINUED | OUTPATIENT
Start: 2023-11-20 | End: 2023-11-21

## 2023-11-20 RX ADMIN — NICOTINE POLACRILEX 4 MG: 2 GUM, CHEWING ORAL at 19:51

## 2023-11-20 RX ADMIN — Medication 400 UNITS: at 08:14

## 2023-11-20 RX ADMIN — LITHIUM CARBONATE 300 MG: 300 TABLET, EXTENDED RELEASE ORAL at 08:13

## 2023-11-20 RX ADMIN — LORAZEPAM 0.5 MG: 0.5 TABLET ORAL at 08:13

## 2023-11-20 RX ADMIN — Medication 10 MG: at 20:54

## 2023-11-20 RX ADMIN — LITHIUM CARBONATE 450 MG: 450 TABLET, EXTENDED RELEASE ORAL at 20:53

## 2023-11-20 RX ADMIN — LORAZEPAM 0.25 MG: 0.5 TABLET ORAL at 14:41

## 2023-11-20 RX ADMIN — LORAZEPAM 0.5 MG: 0.5 TABLET ORAL at 20:53

## 2023-11-20 RX ADMIN — Medication 400 UNITS: at 20:54

## 2023-11-20 RX ADMIN — OLANZAPINE 30 MG: 10 TABLET, FILM COATED ORAL at 20:54

## 2023-11-20 RX ADMIN — PRAZOSIN HYDROCHLORIDE 1 MG: 1 CAPSULE ORAL at 20:53

## 2023-11-20 RX ADMIN — HALOPERIDOL 5 MG: 5 TABLET ORAL at 20:54

## 2023-11-20 RX ADMIN — HYDROXYZINE PAMOATE 50 MG: 50 CAPSULE ORAL at 18:51

## 2023-11-20 RX ADMIN — NICOTINE POLACRILEX 4 MG: 2 GUM, CHEWING ORAL at 13:48

## 2023-11-20 RX ADMIN — HALOPERIDOL 5 MG: 5 TABLET ORAL at 08:12

## 2023-11-20 RX ADMIN — PRAZOSIN HYDROCHLORIDE 1 MG: 1 CAPSULE ORAL at 08:14

## 2023-11-20 RX ADMIN — FLUOXETINE 60 MG: 20 CAPSULE ORAL at 08:13

## 2023-11-20 ASSESSMENT — PAIN - FUNCTIONAL ASSESSMENT
PAIN_FUNCTIONAL_ASSESSMENT: 0-10
PAIN_FUNCTIONAL_ASSESSMENT: 0-10

## 2023-11-20 ASSESSMENT — PAIN SCALES - GENERAL
PAINLEVEL_OUTOF10: 0 - NO PAIN
PAINLEVEL_OUTOF10: 0 - NO PAIN

## 2023-11-20 NOTE — NURSING NOTE
"Upon assessment pt is sitting in front lounge by himself. Pt is friendly and engaged with assessment. Denied anxiety, rated depression 4/10. Denied pain, SI/HI and hallucinations. Pt reports strength as \"using comedy to solve problems\" and goal as to get some good sleep. Pt is much more talkative and focused compared to when I had him as a patient last week. Pt took all PM medications and went to bed. No concerns at this time.  "

## 2023-11-20 NOTE — NURSING NOTE
"RN met with patient in his room. Denies anxiety yet appears mildly anxious, with slight hand/finger tremors noted. Rates depression at 3 of 10. Denies thoughts of harm to self or others. Denies  thoughts of SI or HI. Agrees to contract for safety. Denies  experiencing AH, VH, or TH, \"No, I don't think so.\" Denies pain. Describes sleep as \"I slept okay.\" Describes mood as \"feel all right.\"  Coping skills: \"I don't know\" then spoke of playing musical instruments.  Strengths: \"I can be funny sometimes.\"  Goals: \"Contact my family today.\"  Cooperative, medication adherent.   Q15 minutes safety checks maintained. Will continue to monitor.    Update: Spent a few hours in the dining/lounge area today. Walked laps in the hallway around the unit. Asked for (and received) nicorette gum twice today. Will continue to monitor.  "

## 2023-11-20 NOTE — CARE PLAN
"  Problem: Fall/Injury  Goal: Be free from injury by end of the shift  Outcome: Progressing     Problem: Risk for Suicide  Goal: Accepts medications as prescribed/needed this shift  Outcome: Progressing  Goal: Identifies supports this shift  Outcome: Progressing  Goal: Makes needs known through verbalization or behaviors this shift  Outcome: Progressing  Goal: No self harm this shift  Outcome: Progressing  Goal: Read Safety Guidelines this shift  Outcome: Progressing  Goal: Complete Mental Health Safety Plan (psychiatry only) this shift  Outcome: Progressing     Problem: Sensory Perceptual Alteration as Evidenced by  Goal: Able to discuss content of hallucinations/delusions  Outcome: Progressing  Goal: Verbalizes reduction in hallucinations/delusions  Outcome: Progressing  Goal: Will not act on psychotic perception  Outcome: Progressing   The patient's goals for the shift include \"get some good sleep\"    The clinical goals for the shift include medication compliance  "

## 2023-11-20 NOTE — GROUP NOTE
Group Topic: Gross Motor/Balance Skills   Group Date: 11/20/2023  Start Time: 1115  End Time: 1150  Facilitators: MANDA Farooq   Department: [facility] REHAB THERAPY VIRTUAL    Number of Participants: 6   Group Focus: leisure skills and social skills  Treatment Modality: Other: Recreational Therapy  Interventions utilized were leisure development  Purpose: other: social stimulation, physical engagement, leisure awareness     Name: Alexander Zavala YOB: 1985   MR: [id number]      Facilitator: Recreational Therapist  Level of Participation: active  Quality of Participation: appropriate/pleasant, cooperative, and engaged  Interactions with others: appropriate  Mood/Affect: appropriate and brightens with interaction  Triggers (if applicable): n/a  Cognition: coherent/clear  Progress: Moderate  Comments: PT was actively engaged in task, brightens with interaction, pleasant with peers and staff. PT attending group programming on a regular basis now.   Plan: continue with services

## 2023-11-20 NOTE — CARE PLAN
Still nowhere to go;  Brother Adelso, called, sw called him back, [phone], left a message to call back asap;  told patient to call him back also as he is a possibility; Other brother, Paxton, is calling also and he may be able to stay with him;  5/6 group homes in [facility] are closed, only one open is full with a waiting list;  Paxton has been given sw's number to call asap, as Provider spoke to him; sw will try to call him again asap also to try to arrange temporary relative placement;  have been trying both brothers for days now, and this is the first time we have received call backs.  [facility] is calling [facility] to check when they will have a bed as they have no beds;  [facility] will not accept until he has become a [facility] resident;  Patient wants to go back to live in [place];  sw will continue to follow up with both brothers requesting 1 to take him, then, while he is at [facility]'s, will instruct [facility] to arrange for group home placement, or at least, get on the waiting list;  Discharge asap as soon as we have place. Provider aware.    15:10-Addendum:  Received call back from Mary Diana at [facility], p. [phone], who stated that [facility] MIGHT have a bed tomorrow and IF they do, he can go there; IF NOT [facility] will pay for a hotel until [facility] has a bed open up;  Provider just informed this sw that patient cannot be discharged before Wednesday as she is tapering down on his Ativan.  Phoned back Mary and informed her discharge date, at soonest, would be Wednesday; she stated she will ask [facility] to hold the bed, IF one is available tomorrow; Plan B: to go to a motel funded by [facility] on Wednesday; Plan C: to go to [facility] house, if sw can speak with him. Sw to follow.     15:52-Addendum:  Phoned Adelso Zaavla, patient's brother, p. [phone]; left a voice mail requesting call back.   Phoned Paxton Zavala, patient's brother, p. [phone], no answer, no voice mail. Phoned back again, left a voice mail requesting call back. Sw to follow.

## 2023-11-20 NOTE — CARE PLAN
"The patient's goals for the shift include \"get some good sleep\"    The clinical goals for the shift include medication compliance    Over the shift, the patient made progress towards care plan goals. No PRNs needed, no changes from previous assessment.  "
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
## 2023-11-20 NOTE — GROUP NOTE
"Group Topic: Art Creative   Group Date: 11/20/2023  Start Time: 1500  End Time: 1600  Facilitators: JEOVANNY FarooqS   Department: [facility] REHAB THERAPY VIRTUAL    Number of Participants: 6   Group Focus: leisure skills and social skills  Treatment Modality: Other: Recreational Therapy  Interventions utilized were leisure development  Purpose: other: social stimulation, creative expression, leisure awareness     Name: Alexander Zavala YOB: 1985   MR: [id number]      Facilitator: Recreational Therapist  Level of Participation: minimal  Quality of Participation: appropriate/pleasant, cooperative, passive, and quiet  Interactions with others: appropriate  Mood/Affect: appropriate and brightens with interaction  Triggers (if applicable): n/a  Cognition: coherent/clear  Progress: Minimal  Comments: PT did not engage in art task, but socialized pleasantly with CTRS for about 20 minutes and stated, \"I'm enjoying the music\". PT left early and did not return.   Plan: continue with services      "

## 2023-11-21 LAB
ANION GAP SERPL CALC-SCNC: 11 MMOL/L (ref 10–20)
BASOPHILS # BLD AUTO: 0.09 X10*3/UL (ref 0–0.1)
BASOPHILS NFR BLD AUTO: 1.3 %
BUN SERPL-MCNC: 18 MG/DL (ref 6–23)
CALCIUM SERPL-MCNC: 8.6 MG/DL (ref 8.6–10.3)
CHLORIDE SERPL-SCNC: 104 MMOL/L (ref 98–107)
CO2 SERPL-SCNC: 26 MMOL/L (ref 21–32)
CREAT SERPL-MCNC: 0.64 MG/DL (ref 0.5–1.3)
EOSINOPHIL # BLD AUTO: 0.9 X10*3/UL (ref 0–0.7)
EOSINOPHIL NFR BLD AUTO: 13.1 %
ERYTHROCYTE [DISTWIDTH] IN BLOOD BY AUTOMATED COUNT: 14.1 % (ref 11.5–14.5)
GFR SERPL CREATININE-BSD FRML MDRD: >90 ML/MIN/1.73M*2
GLUCOSE SERPL-MCNC: 78 MG/DL (ref 74–99)
HCT VFR BLD AUTO: 44.8 % (ref 41–52)
HGB BLD-MCNC: 14.5 G/DL (ref 13.5–17.5)
IMM GRANULOCYTES # BLD AUTO: 0.04 X10*3/UL (ref 0–0.7)
IMM GRANULOCYTES NFR BLD AUTO: 0.6 % (ref 0–0.9)
LITHIUM SERPL-SCNC: 0.51 MMOL/L (ref 0.6–1.2)
LYMPHOCYTES # BLD AUTO: 1.71 X10*3/UL (ref 1.2–4.8)
LYMPHOCYTES NFR BLD AUTO: 24.9 %
MCH RBC QN AUTO: 30.1 PG (ref 26–34)
MCHC RBC AUTO-ENTMCNC: 32.4 G/DL (ref 32–36)
MCV RBC AUTO: 93 FL (ref 80–100)
MONOCYTES # BLD AUTO: 0.56 X10*3/UL (ref 0.1–1)
MONOCYTES NFR BLD AUTO: 8.2 %
NEUTROPHILS # BLD AUTO: 3.57 X10*3/UL (ref 1.2–7.7)
NEUTROPHILS NFR BLD AUTO: 51.9 %
NRBC BLD-RTO: 0 /100 WBCS (ref 0–0)
PLATELET # BLD AUTO: 223 X10*3/UL (ref 150–450)
POTASSIUM SERPL-SCNC: 4.1 MMOL/L (ref 3.5–5.3)
RBC # BLD AUTO: 4.82 X10*6/UL (ref 4.5–5.9)
SODIUM SERPL-SCNC: 137 MMOL/L (ref 136–145)
T3FREE SERPL-MCNC: 3 PG/ML (ref 2.3–4.2)
T4 FREE SERPL-MCNC: 0.59 NG/DL (ref 0.61–1.12)
TSH SERPL-ACNC: 1.88 MIU/L (ref 0.44–3.98)
WBC # BLD AUTO: 6.9 X10*3/UL (ref 4.4–11.3)

## 2023-11-21 PROCEDURE — 80048 BASIC METABOLIC PNL TOTAL CA: CPT | Performed by: NURSE PRACTITIONER

## 2023-11-21 PROCEDURE — 2500000001 HC RX 250 WO HCPCS SELF ADMINISTERED DRUGS (ALT 637 FOR MEDICARE OP): Performed by: NURSE PRACTITIONER

## 2023-11-21 PROCEDURE — 84443 ASSAY THYROID STIM HORMONE: CPT | Performed by: NURSE PRACTITIONER

## 2023-11-21 PROCEDURE — 80178 ASSAY OF LITHIUM: CPT | Mod: GEALAB | Performed by: NURSE PRACTITIONER

## 2023-11-21 PROCEDURE — 1240000001 HC SEMI-PRIVATE BH ROOM DAILY

## 2023-11-21 PROCEDURE — 84481 FREE ASSAY (FT-3): CPT | Mod: GEALAB | Performed by: NURSE PRACTITIONER

## 2023-11-21 PROCEDURE — 84439 ASSAY OF FREE THYROXINE: CPT | Performed by: NURSE PRACTITIONER

## 2023-11-21 PROCEDURE — 85025 COMPLETE CBC W/AUTO DIFF WBC: CPT | Performed by: NURSE PRACTITIONER

## 2023-11-21 PROCEDURE — 2500000004 HC RX 250 GENERAL PHARMACY W/ HCPCS (ALT 636 FOR OP/ED): Performed by: NURSE PRACTITIONER

## 2023-11-21 PROCEDURE — 99233 SBSQ HOSP IP/OBS HIGH 50: CPT | Performed by: NURSE PRACTITIONER

## 2023-11-21 PROCEDURE — 2500000001 HC RX 250 WO HCPCS SELF ADMINISTERED DRUGS (ALT 637 FOR MEDICARE OP): Performed by: PSYCHIATRY & NEUROLOGY

## 2023-11-21 PROCEDURE — 36415 COLL VENOUS BLD VENIPUNCTURE: CPT | Performed by: NURSE PRACTITIONER

## 2023-11-21 RX ORDER — LORAZEPAM 0.5 MG/1
0.25 TABLET ORAL NIGHTLY
Status: DISCONTINUED | OUTPATIENT
Start: 2023-11-21 | End: 2023-11-22 | Stop reason: HOSPADM

## 2023-11-21 RX ADMIN — HALOPERIDOL 5 MG: 5 TABLET ORAL at 08:27

## 2023-11-21 RX ADMIN — PRAZOSIN HYDROCHLORIDE 1 MG: 1 CAPSULE ORAL at 08:26

## 2023-11-21 RX ADMIN — PRAZOSIN HYDROCHLORIDE 1 MG: 1 CAPSULE ORAL at 20:43

## 2023-11-21 RX ADMIN — Medication 400 UNITS: at 20:43

## 2023-11-21 RX ADMIN — NICOTINE POLACRILEX 4 MG: 2 GUM, CHEWING ORAL at 16:11

## 2023-11-21 RX ADMIN — Medication 400 UNITS: at 08:27

## 2023-11-21 RX ADMIN — Medication 10 MG: at 20:43

## 2023-11-21 RX ADMIN — LORAZEPAM 0.25 MG: 0.5 TABLET ORAL at 08:27

## 2023-11-21 RX ADMIN — LITHIUM CARBONATE 300 MG: 300 TABLET, EXTENDED RELEASE ORAL at 08:27

## 2023-11-21 RX ADMIN — HALOPERIDOL 5 MG: 5 TABLET ORAL at 20:43

## 2023-11-21 RX ADMIN — LORAZEPAM 0.25 MG: 0.5 TABLET ORAL at 20:44

## 2023-11-21 RX ADMIN — OLANZAPINE 30 MG: 10 TABLET, FILM COATED ORAL at 20:43

## 2023-11-21 RX ADMIN — LITHIUM CARBONATE 450 MG: 450 TABLET, EXTENDED RELEASE ORAL at 20:43

## 2023-11-21 RX ADMIN — FLUOXETINE 60 MG: 20 CAPSULE ORAL at 08:27

## 2023-11-21 ASSESSMENT — PAIN SCALES - GENERAL
PAINLEVEL_OUTOF10: 0 - NO PAIN
PAINLEVEL_OUTOF10: 0 - NO PAIN

## 2023-11-21 ASSESSMENT — PAIN - FUNCTIONAL ASSESSMENT
PAIN_FUNCTIONAL_ASSESSMENT: 0-10
PAIN_FUNCTIONAL_ASSESSMENT: 0-10

## 2023-11-21 ASSESSMENT — COLUMBIA-SUICIDE SEVERITY RATING SCALE - C-SSRS
2. HAVE YOU ACTUALLY HAD ANY THOUGHTS OF KILLING YOURSELF?: NO
5. HAVE YOU STARTED TO WORK OUT OR WORKED OUT THE DETAILS OF HOW TO KILL YOURSELF? DO YOU INTEND TO CARRY OUT THIS PLAN?: NO
1. SINCE LAST CONTACT, HAVE YOU WISHED YOU WERE DEAD OR WISHED YOU COULD GO TO SLEEP AND NOT WAKE UP?: NO

## 2023-11-21 NOTE — DISCHARGE INSTR - APPOINTMENTS
Psychiatry: Dr. Scarlet MD  Medication Management/ Dual Diagnosis Treatment.    DATE: 11/28/2023; TIME: 8:00 am, In Person,    [facility], [facility] location,  [address] [place] [address]  P. [phone];  F. [phone];    Case Management/CPST Services: Lulu Olvera  Community Support Person, To follow asap for permanent housing arrangements;  Call: [phone] and ask for Lulu Olvera, in 1 week to 10 days if she does not reach out;     Tobacco Cessation:  [facility],  11/29/2023, at 9:30am.  Phone appointment; call [phone] to verify this appointment.     [address], [place] [address] (Pharmacy where meds were sent)

## 2023-11-21 NOTE — CARE PLAN
"The patient's goals for the shift include \"try not to over think\"    The clinical goals for the shift include medication compliance    Over the shift, the patient did not make progress toward the following goals. Barriers to progression include acuteness of illness. Recommendations to address these barriers include positive reinforcement     "

## 2023-11-21 NOTE — NURSING NOTE
"Pt interview in the front [facility]  Pt is calm and cooperative  Pt stated his goal \" get sleep\" his strength \" I strong minded\" and his coping skill \" I figure it out\"  Pt rated his anxiety 4/10 depression 7/10  and denied everything else at this time  Pt is appropriate with his answers to the questions at this time   "

## 2023-11-21 NOTE — CARE PLAN
"The patient's goals for the shift include \"get sleep\"    The clinical goals for the shift include medication compliance    Over the shift, the patient did not make progress toward the following goals. Barriers to progression include education on medicfations. Recommendations to address these barriers include more education on medications.    "
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
## 2023-11-21 NOTE — NURSING NOTE
Pt was in bed most of the day came out for one group this afternoon . Pt will be social when he is out on unit , however still is spending a lot of time in his room in bed during the day. Pt is pleasant and cooperative during assessment. Anxiety 3/10 depression 4/10. Pt denied SI/HI and A/V/H. Safety maintained. Med compliant. Pt contracted for safety with this staff at this time.

## 2023-11-21 NOTE — CARE PLAN
[facility] patient has been accepted at [facility] at 4pm tomorrow;  Will set up Round Trip ride;  follow up will be at [facility];  [facility] to work on permanent living arrangement while at [facility];  [facility]  [facility];

## 2023-11-21 NOTE — GROUP NOTE
Group Topic: Cognitive Focus   Group Date: 11/21/2023  Start Time: 1400  End Time: 1430  Facilitators: MANDA Farooq   Department: [facility]ER[facility]    Number of Participants: 2   Group Focus: concentration and social skills  Treatment Modality: Other: Recreational Therapy  Interventions utilized were leisure development and mental fitness  Purpose: other: social stimulation, leisure awareness, enjoyment     Name: Alexander Zavala YOB: 1985   MR: [id number]      Facilitator: Recreational Therapist  Level of Participation: active  Quality of Participation: appropriate/pleasant, attentive, cooperative, and engaged  Interactions with others: appropriate  Mood/Affect: appropriate  Triggers (if applicable): n/a  Cognition: coherent/clear  Progress: Moderate  Comments: PT was engaged and focused on task. Pleasant with peers and staff throughout. PT was pleased to select the music we listened to throughout group.   Plan: continue with services

## 2023-11-22 VITALS
OXYGEN SATURATION: 98 % | WEIGHT: 198.41 LBS | RESPIRATION RATE: 18 BRPM | HEIGHT: 72 IN | HEART RATE: 86 BPM | SYSTOLIC BLOOD PRESSURE: 119 MMHG | BODY MASS INDEX: 26.87 KG/M2 | TEMPERATURE: 98.2 F | DIASTOLIC BLOOD PRESSURE: 77 MMHG

## 2023-11-22 PROCEDURE — 2500000001 HC RX 250 WO HCPCS SELF ADMINISTERED DRUGS (ALT 637 FOR MEDICARE OP): Performed by: NURSE PRACTITIONER

## 2023-11-22 PROCEDURE — 2500000004 HC RX 250 GENERAL PHARMACY W/ HCPCS (ALT 636 FOR OP/ED): Performed by: NURSE PRACTITIONER

## 2023-11-22 PROCEDURE — 2500000001 HC RX 250 WO HCPCS SELF ADMINISTERED DRUGS (ALT 637 FOR MEDICARE OP): Performed by: PSYCHIATRY & NEUROLOGY

## 2023-11-22 PROCEDURE — 99239 HOSP IP/OBS DSCHRG MGMT >30: CPT | Performed by: NURSE PRACTITIONER

## 2023-11-22 RX ORDER — OLANZAPINE 15 MG/1
30 TABLET ORAL NIGHTLY
Qty: 60 TABLET | Refills: 0 | Status: SHIPPED | OUTPATIENT
Start: 2023-11-22 | End: 2023-12-22

## 2023-11-22 RX ORDER — DIPHENHYDRAMINE HCL 25 MG
4 CAPSULE ORAL EVERY 6 HOURS PRN
Qty: 100 EACH | Refills: 0 | Status: SHIPPED | OUTPATIENT
Start: 2023-11-22

## 2023-11-22 RX ORDER — LITHIUM CARBONATE 300 MG/1
300 TABLET, FILM COATED, EXTENDED RELEASE ORAL DAILY
Qty: 30 TABLET | Refills: 0 | Status: SHIPPED | OUTPATIENT
Start: 2023-11-23 | End: 2023-12-23

## 2023-11-22 RX ORDER — FLUOXETINE HYDROCHLORIDE 20 MG/1
60 CAPSULE ORAL DAILY
Qty: 90 CAPSULE | Refills: 0 | Status: SHIPPED | OUTPATIENT
Start: 2023-11-23 | End: 2023-12-23

## 2023-11-22 RX ORDER — HALOPERIDOL 5 MG/1
5 TABLET ORAL 2 TIMES DAILY
Qty: 60 TABLET | Refills: 1 | Status: SHIPPED | OUTPATIENT
Start: 2023-11-22 | End: 2024-01-21

## 2023-11-22 RX ORDER — LITHIUM CARBONATE 300 MG/1
300 TABLET, FILM COATED, EXTENDED RELEASE ORAL EVERY MORNING
Qty: 30 TABLET | Refills: 0 | Status: SHIPPED | OUTPATIENT
Start: 2023-11-22 | End: 2023-11-22 | Stop reason: HOSPADM

## 2023-11-22 RX ORDER — HYDROXYZINE PAMOATE 50 MG/1
50 CAPSULE ORAL 3 TIMES DAILY PRN
Qty: 45 CAPSULE | Refills: 0 | Status: SHIPPED | OUTPATIENT
Start: 2023-11-22 | End: 2023-12-07

## 2023-11-22 RX ORDER — ERGOCALCIFEROL 1.25 MG/1
50000 CAPSULE ORAL
Qty: 8 CAPSULE | Refills: 0 | Status: SHIPPED | OUTPATIENT
Start: 2023-11-24 | End: 2024-01-19

## 2023-11-22 RX ORDER — PRAZOSIN HYDROCHLORIDE 1 MG/1
1 CAPSULE ORAL 2 TIMES DAILY
Qty: 60 CAPSULE | Refills: 0 | Status: SHIPPED | OUTPATIENT
Start: 2023-11-22 | End: 2023-12-22

## 2023-11-22 RX ORDER — VITAMIN E MIXED 400 UNIT
400 CAPSULE ORAL 2 TIMES DAILY
Qty: 60 CAPSULE | Refills: 0 | Status: SHIPPED | OUTPATIENT
Start: 2023-11-22 | End: 2023-12-22

## 2023-11-22 RX ORDER — LITHIUM CARBONATE 450 MG/1
450 TABLET ORAL NIGHTLY
Qty: 30 TABLET | Refills: 0 | Status: SHIPPED | OUTPATIENT
Start: 2023-11-22 | End: 2023-12-22

## 2023-11-22 RX ORDER — ACETAMINOPHEN, DIPHENHYDRAMINE HCL, PHENYLEPHRINE HCL 325; 25; 5 MG/1; MG/1; MG/1
10 TABLET ORAL NIGHTLY
Qty: 30 TABLET | Refills: 0 | Status: SHIPPED | OUTPATIENT
Start: 2023-11-22 | End: 2023-12-22

## 2023-11-22 RX ADMIN — Medication 400 UNITS: at 08:31

## 2023-11-22 RX ADMIN — HALOPERIDOL 5 MG: 5 TABLET ORAL at 08:31

## 2023-11-22 RX ADMIN — FLUOXETINE 60 MG: 20 CAPSULE ORAL at 08:31

## 2023-11-22 RX ADMIN — NICOTINE POLACRILEX 4 MG: 2 GUM, CHEWING ORAL at 10:51

## 2023-11-22 RX ADMIN — PRAZOSIN HYDROCHLORIDE 1 MG: 1 CAPSULE ORAL at 08:32

## 2023-11-22 RX ADMIN — LITHIUM CARBONATE 300 MG: 300 TABLET, EXTENDED RELEASE ORAL at 08:31

## 2023-11-22 ASSESSMENT — PAIN SCALES - GENERAL: PAINLEVEL_OUTOF10: 0 - NO PAIN

## 2023-11-22 ASSESSMENT — PAIN - FUNCTIONAL ASSESSMENT: PAIN_FUNCTIONAL_ASSESSMENT: 0-10
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
## 2023-11-22 NOTE — CARE PLAN
Problem: Fall/Injury  Goal: Be free from injury by end of the shift  Outcome: Progressing     Problem: Risk for Suicide  Goal: Accepts medications as prescribed/needed this shift  Outcome: Progressing  Goal: No self harm this shift  Outcome: Progressing       Over the shift, the patient did  make progress toward the following goals.

## 2023-11-22 NOTE — GROUP NOTE
Group Topic: Gross Motor/Balance Skills   Group Date: 11/22/2023  Start Time: 1115  End Time: 1200  Facilitators: JEOVANNY FarooqS   Department: [facility]ERAP[facility]    Number of Participants: 4   Group Focus: concentration, leisure skills, and social skills  Treatment Modality: Other: Recreational Therapy  Interventions utilized were leisure development  Purpose: other: social interaction, physical engagement, leisure awareness, enjoyment     Name: Alexander Zavala YOB: 1985   MR: [id number]      Facilitator: Recreational Therapist  Level of Participation: active  Quality of Participation: appropriate/pleasant, attentive, cooperative, and engaged  Interactions with others: appropriate  Mood/Affect: appropriate  Triggers (if applicable): n/a  Cognition: coherent/clear  Progress: Moderate  Comments: PT was focused and engaged in task. Calm, cooperative, and pleasant throughout. Looking forward to discharge later today.   Plan: continue with services

## 2023-11-22 NOTE — NURSING NOTE
"11/21/2023 @2045- Pt was interviewed while he was lying in bed. Pt was out on the unit previously for snack. Pt was calm and bright, and even making jokes. Pt rated anxiety 2/10 and depression 4/10. Pt denies SI/HI, AV hallucinations and pain at this time. Pt verbalized that he was excited for his possible discharge tomorrow. Pt stated his goal is \"to not think so much,\" and stated his coping skill to help with over thinking is \"music.\" Pt stated his two strengths are \"witty and smart.\" Pt took all scheduled nighttime medications. No PRNs needed. Pt is currently resting in bed without any obvious signs or symptoms of distress. No new orders to carry out at this time. Q15 minute safety checks maintained.    11/22/2023 @0500- Pt had an uneventful night. Pt slept throughout. No PRNs needed. Pt is currently sleeping in bed without any obvious signs or symptoms of distress. No new orders to carry out at this time. Q 15 minute safety checks maintained.     11/22/2023 @0545- Pt slept 8.5 hours unbroken.  "

## 2023-11-22 NOTE — CARE PLAN
"The patient's goals for the shift include \"to get out of here\"    The clinical goals for the shift include \"Treatment compliant \"    Over the shift, the patient did not make progress toward the following goals. Barriers to progression include acuteness of illness . Recommendations to address these barriers include positive reinforcement.    Problem: Nutrition  Goal: Oral intake greater than 50%  Outcome: Progressing  Goal: Oral intake greater 75%  Outcome: Progressing  Goal: Adequate PO fluid intake  Outcome: Progressing  Goal: Nutrition support goals are met within 48 hrs  Outcome: Progressing  Goal: Nutrition support is meeting 75% of nutrient needs  Outcome: Progressing  Goal: BG  mg/dL  Outcome: Progressing  Goal: Lab values WNL  Outcome: Progressing  Goal: Electrolytes WNL  Outcome: Progressing  Goal: Promote healing  Outcome: Progressing  Goal: Maintain stable weight  Outcome: Progressing  Goal: Gradual weight gain  Outcome: Progressing     Problem: Fall/Injury  Goal: Be free from injury by end of the shift  Outcome: Progressing     Problem: Risk for Suicide  Goal: Accepts medications as prescribed/needed this shift  Outcome: Progressing  Goal: Identifies supports this shift  Outcome: Progressing  Goal: Makes needs known through verbalization or behaviors this shift  Outcome: Progressing  Goal: No self harm this shift  Outcome: Progressing  Goal: Read Safety Guidelines this shift  Outcome: Progressing  Goal: Complete Mental Health Safety Plan (psychiatry only) this shift  Outcome: Progressing     Problem: Sensory Perceptual Alteration as Evidenced by  Goal: Able to discuss content of hallucinations/delusions  Outcome: Progressing  Goal: Verbalizes reduction in hallucinations/delusions  Outcome: Progressing  Goal: Will not act on psychotic perception  Outcome: Progressing       "

## 2023-11-22 NOTE — DISCHARGE INSTRUCTIONS
PLEASE TAKE YOUR MEDICATION AS PRESCRIBED AND ATTEND YOUR FOLLOW-UP APPOINTMENT(S) AS SCHEDULED.     PLEASE CONTINUE TO ABSTAIN FROM /DO NOT USE ALCOHOL AND DRUGS (PHARMACEUTICAL OR STREET DRUGS) NOT PRESCRIBED TO YOU.      IN CASE OF EMERGENCY.  Call your outpatient psychiatrist right away or travel to the nearest emergency department if you have new or worsening mental health symptoms; unusual changes in behavior, mood, thoughts or feelings; thoughts of wanting to harm yourself or other people; or if you are experiencing serious medication side effects.   CALL 911 IN THE CASE OF AN EMERGENCY.     WHAT SHOULD I KNOW ABOUT STORAGE AND DISPOSAL OF MY MEDICATION(S)?  --Keep each medication in the container it came in, tightly closed, and out of reach of children.  --Take any medication that is outdated or no longer needed to your local police station for proper disposal.    WHAT OTHER INFORMATION SHOULD I KNOW?  --Keep all appointments with your doctor.  --Do not let anyone else take your medication(s). Ask your pharmacist any questions you have about refilling your prescription.  --It is important for you TO KEEP A WRITTEN LIST OF ALL THE PRESCRIPTION & NON-PRESCRIPTION (over-the-counter) MEDICINES YOU ARE TAKING, INCLUDING products such as vitamins, minerals, or other dietary supplements. You should bring this list with you each time you visit a doctor or if you are admitted to a hospital. It is also important information to carry with you in case of emergencies.      PLEASE BE AWARE that your recent abstinence from drugs while in the hospital PLACES YOU AT INCREASED RISK for unintentional overdose, and possibly death, if you were to relapse and start using drugs again.     Project JACKELINE (Deaths Avoided With Naltrexone) is a community-based overdose education and naloxone distribution program. Project JACKELINE participants receive training on: Recognizing the signs and symptoms of overdose; Distinguishing between  different types of overdose; Performing rescue breathing; Calling emergency medical services; Administering intranasal Naloxone.  You will be given a list of [facility] JACKELINE sites in [place], in case you would like more information.

## 2023-11-22 NOTE — NURSING NOTE
"Pt was in bed most of the morning, he did get up before lunch and was out on the unit and attending groups. Pt stated \" I want to get out of here I am ready , I want to go home\" Anxiety 3/10 depression 4/10. Pt denied SI/HI and A/V/H. Safety maintained. Med compliant. Pt contracted for safety with this staff at this time. Discharge instructions and medications were reviewed with pt and they verbalized understanding and readiness for discharge. Pt's belongings returned and discharged from the unit per doctor's order.     "

## 2023-11-24 NOTE — CARE PLAN
Faxed Statement of Expert Evaluation to Mary Diana at [facility] to refer to their guardianship dept.

## 2023-11-27 ENCOUNTER — TELEPHONE (OUTPATIENT)
Dept: BEHAVIORAL HEALTH | Facility: HOSPITAL | Age: 38
End: 2023-11-27
Payer: MEDICARE

## 2023-11-30 ENCOUNTER — HOSPITAL ENCOUNTER (OUTPATIENT)
Dept: CARDIOLOGY | Facility: HOSPITAL | Age: 38
Discharge: HOME | End: 2023-11-30
Payer: MEDICARE

## 2023-11-30 PROCEDURE — 93005 ELECTROCARDIOGRAM TRACING: CPT

## 2023-12-14 ENCOUNTER — HOSPITAL ENCOUNTER (EMERGENCY)
Facility: HOSPITAL | Age: 38
Discharge: HOME | End: 2023-12-14
Attending: EMERGENCY MEDICINE
Payer: MEDICARE

## 2023-12-14 ENCOUNTER — APPOINTMENT (OUTPATIENT)
Dept: RADIOLOGY | Facility: HOSPITAL | Age: 38
End: 2023-12-14
Payer: MEDICARE

## 2023-12-14 VITALS
WEIGHT: 200 LBS | OXYGEN SATURATION: 95 % | HEIGHT: 71 IN | TEMPERATURE: 97.1 F | SYSTOLIC BLOOD PRESSURE: 112 MMHG | DIASTOLIC BLOOD PRESSURE: 67 MMHG | RESPIRATION RATE: 19 BRPM | HEART RATE: 111 BPM | BODY MASS INDEX: 28 KG/M2

## 2023-12-14 DIAGNOSIS — K59.00 CONSTIPATION, UNSPECIFIED CONSTIPATION TYPE: Primary | ICD-10-CM

## 2023-12-14 PROCEDURE — 74018 RADEX ABDOMEN 1 VIEW: CPT | Mod: FY

## 2023-12-14 PROCEDURE — 74018 RADEX ABDOMEN 1 VIEW: CPT | Mod: FOREIGN READ | Performed by: RADIOLOGY

## 2023-12-14 PROCEDURE — 99283 EMERGENCY DEPT VISIT LOW MDM: CPT | Performed by: EMERGENCY MEDICINE

## 2023-12-14 RX ORDER — SYRING-NEEDL,DISP,INSUL,0.3 ML 29 G X1/2"
296 SYRINGE, EMPTY DISPOSABLE MISCELLANEOUS ONCE
Status: DISCONTINUED | OUTPATIENT
Start: 2023-12-14 | End: 2023-12-14 | Stop reason: HOSPADM

## 2023-12-14 RX ORDER — SYRING-NEEDL,DISP,INSUL,0.3 ML 29 G X1/2"
296 SYRINGE, EMPTY DISPOSABLE MISCELLANEOUS ONCE
Qty: 296 ML | Refills: 0 | Status: SHIPPED | OUTPATIENT
Start: 2023-12-14 | End: 2023-12-14

## 2023-12-14 ASSESSMENT — PAIN - FUNCTIONAL ASSESSMENT: PAIN_FUNCTIONAL_ASSESSMENT: 0-10

## 2023-12-14 ASSESSMENT — COLUMBIA-SUICIDE SEVERITY RATING SCALE - C-SSRS
1. IN THE PAST MONTH, HAVE YOU WISHED YOU WERE DEAD OR WISHED YOU COULD GO TO SLEEP AND NOT WAKE UP?: YES
6. HAVE YOU EVER DONE ANYTHING, STARTED TO DO ANYTHING, OR PREPARED TO DO ANYTHING TO END YOUR LIFE?: NO
2. HAVE YOU ACTUALLY HAD ANY THOUGHTS OF KILLING YOURSELF?: NO

## 2023-12-14 ASSESSMENT — ENCOUNTER SYMPTOMS
ABDOMINAL PAIN: 1
DIFFICULTY URINATING: 1
CONSTIPATION: 1

## 2023-12-14 ASSESSMENT — PAIN SCALES - GENERAL: PAINLEVEL_OUTOF10: 0 - NO PAIN

## 2023-12-14 NOTE — ED NOTES
Recreational drug use, pt admits to history of SI, states all his life just blurs together, pt rocking and very fidgety in chair     Jennifer Medrano RN  12/14/23 [id number]

## 2023-12-15 NOTE — ED PROVIDER NOTES
History:  Chief Complaint   Patient presents with    incontinent     Pt having episodes of incontinence while asleep,      HPI    History of Present Illness:  HPI    History of Present Illness:    Patient information was obtained from: Patient  History/exam Limitations: Patient is a difficult historian, psychiatric illness history  Patient resented to the Emergency Department: Private vehicle    Chief Complaint: Constipation, urinary incontinence    HPI:  Alexander Zavala, 38 y.o. male presents today with: Patient presents to the ED today with 2 complaints.  First complaint is that he is constipated and he is having a difficult time having a bowel movement.  The second problem is that he states that he has been holding his urine all day and then having accidents.  When asked why he is holding his urine and not having bowel movements he just simply states that he just does not feel like going.  No real focal complaints of pain or discomfort.  Patient denies any chest pain or shortness of breath.  He does use methamphetamine occasionally.  Times are mild and constant.    Past Medical History:    Past Medical History:   Diagnosis Date    Drug use     Schizophrenia (CMS/[facility])        History reviewed. No pertinent surgical history.    No family history on file.    No Known Allergies    Social History     Tobacco Use    Smoking status: Every Day     Types: Cigarettes    Smokeless tobacco: Never   Vaping Use    Vaping Use: Some days   Substance Use Topics    Drug use: Yes     Types: Methamphetamines, Marijuana     Past medical, surgical, social, and family history that was noted in the nursing note was also reviewed.    ROS:  Review of Systems   Gastrointestinal:  Positive for abdominal pain and constipation.   Genitourinary:  Positive for difficulty urinating.   All other systems reviewed and are negative.      Physical Exam:  ED Triage Vitals   Temp Heart Rate Resp BP   12/14/23 1841 12/14/23 1841 12/14/23 1841 12/14/23 2119  "  36.2 °C (97.1 °F) (!) 130 20 112/67      SpO2 Temp src Heart Rate Source Patient Position   12/14/23 1841 -- -- --   98 %         BP Location FiO2 (%)     -- --             Vitals: Blood pressure 112/67, pulse (!) 111, temperature 36.2 °C (97.1 °F), resp. rate 19, height 1.803 m (5' 11\"), weight 90.7 kg (200 lb), SpO2 95 %.  Vitals:    12/14/23 1841 12/14/23 2119   BP:  112/67   Pulse: (!) 130 (!) 111   Resp: 20 19   Temp: 36.2 °C (97.1 °F)    SpO2: 98% 95%   Weight: 90.7 kg (200 lb)    Height: 1.803 m (5' 11\")        Physical Exam  Vitals and nursing note reviewed.   Constitutional:       General: He is not in acute distress.     Appearance: He is well-developed.   HENT:      Head: Normocephalic and atraumatic.   Eyes:      Conjunctiva/sclera: Conjunctivae normal.   Cardiovascular:      Rate and Rhythm: Normal rate and regular rhythm.      Heart sounds: No murmur heard.  Pulmonary:      Effort: Pulmonary effort is normal. No respiratory distress.      Breath sounds: Normal breath sounds.   Abdominal:      Palpations: Abdomen is soft.      Tenderness: There is no abdominal tenderness.   Musculoskeletal:         General: No swelling.      Cervical back: Neck supple.   Skin:     General: Skin is warm and dry.      Capillary Refill: Capillary refill takes less than 2 seconds.   Neurological:      Mental Status: He is alert.   Psychiatric:         Mood and Affect: Mood normal.         ED Course:  Diagnostic test reviewed:  Diagnoses as of 12/14/23 2316   Constipation, unspecified constipation type                 Administrated medications:  Medications   magnesium citrate solution 296 mL (296 mL oral Not Given 12/14/23 2115)   magnesium citrate solution 296 mL (296 mL oral Not Given 12/14/23 2120)       Discharge Medication List as of 12/14/2023  9:18 PM        START taking these medications    Details   magnesium citrate solution Take 296 mL by mouth 1 time for 1 dose., Starting Thu 12/14/2023, Normal       "       Test ordered and reviewed:  Labs Reviewed   URINALYSIS WITH REFLEX MICROSCOPIC AND CULTURE       XR abdomen 1 view   Final Result   Moderate stool burden compatible with history of constipation.   Remainder as above.   Signed by Kin Egan MD          ED Impression:  1. Constipation, unspecified constipation type  magnesium citrate solution          Critical care time: 0 (Zero) minutes.    Medical Decision Making  Patient refusing to provide a urine sample in the ED.  Does have a moderate amount of stool on the x-ray.  Suspect his discomfort is related to constipation.  Will give him some magnesium citrate and discharged home.  I will discharge the patient home.  Discussed the results of the exam and/or testing.  Written instructions provided.  Discussed return reasons, patient verbalizes understanding.  Stress follow-up with PCP.    Problems Addressed:  Constipation, unspecified constipation type: acute illness or injury    Amount and/or Complexity of Data Reviewed  External Data Reviewed: radiology and notes.     Details: 11/22/2023 Hospital discharge summary/psychiatry note reviewed.  Radiology: ordered and independent interpretation performed. Decision-making details documented in ED Course.     Details: ED physician interpretation: Moderate amount of stool burden, constipation.        Diagnosis Ruled In: See clinical impression above.  Diagnoses Ruled Out: Small bowel obstruction and Sepsis    History Obtained From: Patient    Test interpretations: See ED course if present.    Consideration for tests/treatments/admission not undertaken: None    Social Determinants of Health:  Psychiatric illness    MDM  Reviewed: vitals, nursing note and previous chart  Reviewed previous: labs  Interpretation: x-ray        No follow-ups on file.    Ricardo Shen DO  12/14/2023  Attending Emergency Physician    Clinician of Record Attestation: Dr. Ricardo MORALES DO, am the primary clinician of record.    Please  note this report has been produced using speech recognition software, and may contain errors related to that system - Including errors in grammar, punctuation, and spelling, as well as words and phrases that may be inappropriate. If there are any questions or concerns, please contact the dictating physician/physician assistant for clarification.                 Ricardo Pro, DO  12/14/23 6151

## 2023-12-25 LAB
ATRIAL RATE: 66 BPM
P AXIS: 80 DEGREES
P OFFSET: 202 MS
P ONSET: 146 MS
PR INTERVAL: 140 MS
Q ONSET: 216 MS
QRS COUNT: 11 BEATS
QRS DURATION: 94 MS
QT INTERVAL: 412 MS
QTC CALCULATION(BAZETT): 431 MS
QTC FREDERICIA: 425 MS
R AXIS: 84 DEGREES
T AXIS: 67 DEGREES
T OFFSET: 422 MS
VENTRICULAR RATE: 66 BPM

## 2024-01-01 ENCOUNTER — HOSPITAL ENCOUNTER (EMERGENCY)
Facility: HOSPITAL | Age: 39
Discharge: HOME | End: 2024-01-02
Attending: EMERGENCY MEDICINE
Payer: MEDICARE

## 2024-01-01 VITALS
SYSTOLIC BLOOD PRESSURE: 131 MMHG | WEIGHT: 200 LBS | DIASTOLIC BLOOD PRESSURE: 86 MMHG | RESPIRATION RATE: 18 BRPM | OXYGEN SATURATION: 96 % | BODY MASS INDEX: 32.14 KG/M2 | HEART RATE: 105 BPM | HEIGHT: 66 IN

## 2024-01-01 DIAGNOSIS — F20.0 PARANOID SCHIZOPHRENIA (MULTI): ICD-10-CM

## 2024-01-01 DIAGNOSIS — F19.951: Primary | ICD-10-CM

## 2024-01-01 DIAGNOSIS — F15.10 METHAMPHETAMINE USE (MULTI): ICD-10-CM

## 2024-01-01 LAB
ALBUMIN SERPL BCP-MCNC: 4.4 G/DL (ref 3.4–5)
ALP SERPL-CCNC: 83 U/L (ref 33–120)
ALT SERPL W P-5'-P-CCNC: 12 U/L (ref 10–52)
ANION GAP SERPL CALC-SCNC: 13 MMOL/L (ref 10–20)
AST SERPL W P-5'-P-CCNC: 17 U/L (ref 9–39)
BASOPHILS # BLD AUTO: 0.05 X10*3/UL (ref 0–0.1)
BASOPHILS NFR BLD AUTO: 0.6 %
BILIRUB SERPL-MCNC: 0.4 MG/DL (ref 0–1.2)
BUN SERPL-MCNC: 16 MG/DL (ref 6–23)
CALCIUM SERPL-MCNC: 9.6 MG/DL (ref 8.6–10.3)
CHLORIDE SERPL-SCNC: 102 MMOL/L (ref 98–107)
CO2 SERPL-SCNC: 27 MMOL/L (ref 21–32)
CREAT SERPL-MCNC: 0.71 MG/DL (ref 0.5–1.3)
EOSINOPHIL # BLD AUTO: 0.34 X10*3/UL (ref 0–0.7)
EOSINOPHIL NFR BLD AUTO: 4.2 %
ERYTHROCYTE [DISTWIDTH] IN BLOOD BY AUTOMATED COUNT: 13.1 % (ref 11.5–14.5)
ETHANOL SERPL-MCNC: <10 MG/DL
GFR SERPL CREATININE-BSD FRML MDRD: >90 ML/MIN/1.73M*2
GLUCOSE SERPL-MCNC: 118 MG/DL (ref 74–99)
HCT VFR BLD AUTO: 46.7 % (ref 41–52)
HGB BLD-MCNC: 16 G/DL (ref 13.5–17.5)
HOLD SPECIMEN: NORMAL
IMM GRANULOCYTES # BLD AUTO: 0.02 X10*3/UL (ref 0–0.7)
IMM GRANULOCYTES NFR BLD AUTO: 0.2 % (ref 0–0.9)
INR PPP: 1.2 (ref 0.9–1.1)
LACTATE SERPL-SCNC: 1.4 MMOL/L (ref 0.4–2)
LACTATE SERPL-SCNC: 2.2 MMOL/L (ref 0.4–2)
LYMPHOCYTES # BLD AUTO: 2.31 X10*3/UL (ref 1.2–4.8)
LYMPHOCYTES NFR BLD AUTO: 28.8 %
MCH RBC QN AUTO: 30.5 PG (ref 26–34)
MCHC RBC AUTO-ENTMCNC: 34.3 G/DL (ref 32–36)
MCV RBC AUTO: 89 FL (ref 80–100)
MONOCYTES # BLD AUTO: 0.32 X10*3/UL (ref 0.1–1)
MONOCYTES NFR BLD AUTO: 4 %
NEUTROPHILS # BLD AUTO: 4.97 X10*3/UL (ref 1.2–7.7)
NEUTROPHILS NFR BLD AUTO: 62.2 %
NRBC BLD-RTO: 0 /100 WBCS (ref 0–0)
PLATELET # BLD AUTO: 244 X10*3/UL (ref 150–450)
POTASSIUM SERPL-SCNC: 3.5 MMOL/L (ref 3.5–5.3)
PROT SERPL-MCNC: 7.5 G/DL (ref 6.4–8.2)
PROTHROMBIN TIME: 13.5 SECONDS (ref 9.8–12.8)
RBC # BLD AUTO: 5.25 X10*6/UL (ref 4.5–5.9)
SARS-COV-2 RNA RESP QL NAA+PROBE: NOT DETECTED
SODIUM SERPL-SCNC: 138 MMOL/L (ref 136–145)
WBC # BLD AUTO: 8 X10*3/UL (ref 4.4–11.3)

## 2024-01-01 PROCEDURE — 96372 THER/PROPH/DIAG INJ SC/IM: CPT

## 2024-01-01 PROCEDURE — 82077 ASSAY SPEC XCP UR&BREATH IA: CPT | Performed by: EMERGENCY MEDICINE

## 2024-01-01 PROCEDURE — 85610 PROTHROMBIN TIME: CPT | Performed by: EMERGENCY MEDICINE

## 2024-01-01 PROCEDURE — 99285 EMERGENCY DEPT VISIT HI MDM: CPT | Mod: 25 | Performed by: EMERGENCY MEDICINE

## 2024-01-01 PROCEDURE — 80053 COMPREHEN METABOLIC PANEL: CPT | Performed by: EMERGENCY MEDICINE

## 2024-01-01 PROCEDURE — 99283 EMERGENCY DEPT VISIT LOW MDM: CPT | Mod: 25

## 2024-01-01 PROCEDURE — 36415 COLL VENOUS BLD VENIPUNCTURE: CPT | Performed by: EMERGENCY MEDICINE

## 2024-01-01 PROCEDURE — 87635 SARS-COV-2 COVID-19 AMP PRB: CPT | Performed by: EMERGENCY MEDICINE

## 2024-01-01 PROCEDURE — 85025 COMPLETE CBC W/AUTO DIFF WBC: CPT | Performed by: EMERGENCY MEDICINE

## 2024-01-01 PROCEDURE — 83605 ASSAY OF LACTIC ACID: CPT | Performed by: EMERGENCY MEDICINE

## 2024-01-01 RX ORDER — OLANZAPINE 10 MG/2ML
10 INJECTION, POWDER, FOR SOLUTION INTRAMUSCULAR ONCE AS NEEDED
Status: COMPLETED | OUTPATIENT
Start: 2024-01-01 | End: 2024-01-02

## 2024-01-01 SDOH — HEALTH STABILITY: MENTAL HEALTH: SUICIDE ASSESSMENT: ADULT (C-SSRS)

## 2024-01-01 SDOH — HEALTH STABILITY: MENTAL HEALTH: HAVE YOU WISHED YOU WERE DEAD OR WISHED YOU COULD GO TO SLEEP AND NOT WAKE UP?: NO

## 2024-01-01 SDOH — HEALTH STABILITY: MENTAL HEALTH: SLEEP PATTERN: INSOMNIA

## 2024-01-01 SDOH — HEALTH STABILITY: MENTAL HEALTH: BEHAVIORS/MOOD: ANXIOUS;RESTLESS

## 2024-01-01 SDOH — HEALTH STABILITY: MENTAL HEALTH: HAVE YOU EVER DONE ANYTHING, STARTED TO DO ANYTHING, OR PREPARED TO DO ANYTHING TO END YOUR LIFE?: NO

## 2024-01-01 SDOH — HEALTH STABILITY: MENTAL HEALTH: HAVE YOU ACTUALLY HAD ANY THOUGHTS OF KILLING YOURSELF?: NO

## 2024-01-01 ASSESSMENT — COLUMBIA-SUICIDE SEVERITY RATING SCALE - C-SSRS
6. HAVE YOU EVER DONE ANYTHING, STARTED TO DO ANYTHING, OR PREPARED TO DO ANYTHING TO END YOUR LIFE?: NO
1. IN THE PAST MONTH, HAVE YOU WISHED YOU WERE DEAD OR WISHED YOU COULD GO TO SLEEP AND NOT WAKE UP?: NO
2. HAVE YOU ACTUALLY HAD ANY THOUGHTS OF KILLING YOURSELF?: NO

## 2024-01-01 ASSESSMENT — PAIN - FUNCTIONAL ASSESSMENT: PAIN_FUNCTIONAL_ASSESSMENT: UNABLE TO SELF-REPORT

## 2024-01-01 NOTE — ED TRIAGE NOTES
"Pt ambulatory to ED c/o \"not sleeping for a while\" and having auditory hallucinations.  Pt states he has hx of meth use, appears to be under the influence and is constantly moving and does not sit still.  Pt denies SI/HI at triage.   "

## 2024-01-02 ENCOUNTER — APPOINTMENT (OUTPATIENT)
Dept: CARDIOLOGY | Facility: HOSPITAL | Age: 39
End: 2024-01-02
Payer: MEDICARE

## 2024-01-02 PROCEDURE — 93005 ELECTROCARDIOGRAM TRACING: CPT

## 2024-01-02 PROCEDURE — S0166 INJ OLANZAPINE 2.5MG: HCPCS | Mod: SE | Performed by: EMERGENCY MEDICINE

## 2024-01-02 PROCEDURE — 2500000004 HC RX 250 GENERAL PHARMACY W/ HCPCS (ALT 636 FOR OP/ED): Mod: SE | Performed by: EMERGENCY MEDICINE

## 2024-01-02 RX ADMIN — OLANZAPINE 10 MG: 10 INJECTION, POWDER, FOR SOLUTION INTRAMUSCULAR at 03:47

## 2024-01-02 SDOH — HEALTH STABILITY: MENTAL HEALTH: HAVE YOU EVER TRIED TO KILL YOURSELF?: YES

## 2024-01-02 SDOH — HEALTH STABILITY: MENTAL HEALTH: SUICIDAL BEHAVIOR (3 MONTHS): NO

## 2024-01-02 SDOH — HEALTH STABILITY: MENTAL HEALTH: IN THE PAST FEW WEEKS, HAVE YOU FELT THAT YOU OR YOUR FAMILY WOULD BE BETTER OFF IF YOU WERE DEAD?: NO

## 2024-01-02 SDOH — HEALTH STABILITY: MENTAL HEALTH: IN THE PAST WEEK, HAVE YOU BEEN HAVING THOUGHTS ABOUT KILLING YOURSELF?: NO

## 2024-01-02 SDOH — ECONOMIC STABILITY: HOUSING INSECURITY: FEELS SAFE LIVING IN HOME: NO

## 2024-01-02 SDOH — HEALTH STABILITY: MENTAL HEALTH: ANXIETY SYMPTOMS: NO PROBLEMS REPORTED OR OBSERVED.

## 2024-01-02 SDOH — HEALTH STABILITY: MENTAL HEALTH: NON-SPECIFIC ACTIVE SUICIDAL THOUGHTS (PAST 1 MONTH): NO

## 2024-01-02 SDOH — HEALTH STABILITY: MENTAL HEALTH: DEPRESSION SYMPTOMS: FEELINGS OF HELPLESSNESS;FEELINGS OF HOPELESSESS;FEELINGS OF WORTHLESSNESS

## 2024-01-02 SDOH — ECONOMIC STABILITY: GENERAL

## 2024-01-02 SDOH — HEALTH STABILITY: MENTAL HEALTH: WHEN DID YOU TRY TO KILL YOURSELF?: UNK

## 2024-01-02 SDOH — HEALTH STABILITY: MENTAL HEALTH: ARE YOU HAVING THOUGHTS OF KILLING YOURSELF RIGHT NOW?: NO

## 2024-01-02 SDOH — HEALTH STABILITY: MENTAL HEALTH: WISH TO BE DEAD (PAST 1 MONTH): NO

## 2024-01-02 SDOH — HEALTH STABILITY: MENTAL HEALTH: HOW DID YOU TRY TO KILL YOURSELF?: STATED IN PSYCH HX

## 2024-01-02 SDOH — HEALTH STABILITY: MENTAL HEALTH: IN THE PAST FEW WEEKS, HAVE YOU WISHED YOU WERE DEAD?: NO

## 2024-01-02 SDOH — HEALTH STABILITY: MENTAL HEALTH: SUICIDAL BEHAVIOR (LIFETIME): YES

## 2024-01-02 ASSESSMENT — LIFESTYLE VARIABLES
SUBSTANCE_ABUSE_PAST_12_MONTHS: YES
PRESCIPTION_ABUSE_PAST_12_MONTHS: NO

## 2024-01-02 NOTE — ED NOTES
Dr. Rodríguez evaluated and determined patient appropriate to be discharged. Notified EPAT. Pt is not homicidal/suicidal. Pt states he does not want to hurt himself or anyone else. Refusing to provide urine sample which was critical to psychiatric placement. Pt belongings will be returned and patient will be discharged.     Yas Wei RN  01/02/24 0760
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
## 2024-01-02 NOTE — PROGRESS NOTES
"Alexander Zavala is a 38 y.o. male on day 0 of admission presenting with several weeks of sleep difficulty, confused and illogical thoughts.  Patient has history of schizophrenia and has been noncompliant on his medications since last hospitalization.  He was medically cleared by Dr. Harris and was signed out to me pending EPAT evaluation.      Subjective   Refusing to provide urine specimen.  Voicing no complaints.       Objective     Physical Exam  Vitals reviewed.   HENT:      Head: Normocephalic and atraumatic.   Skin:     General: Skin is warm.   Neurological:      General: No focal deficit present.      Mental Status: He is alert.      Cranial Nerves: No cranial nerve deficit.      Motor: No weakness.      Gait: Gait normal.   Psychiatric:      Comments: Restless, distracted         Last Recorded Vitals  Blood pressure 131/86, pulse 105, resp. rate 18, height 1.676 m (5' 6\"), weight 90.7 kg (200 lb), SpO2 96 %.  Intake/Output last 3 Shifts:  No intake/output data recorded.    Relevant Results              Labs Reviewed   PROTIME-INR - Abnormal       Result Value    Protime 13.5 (*)     INR 1.2 (*)    LACTATE - Abnormal    Lactate 2.2 (*)     Narrative:     Venipuncture immediately after or during the administration of Metamizole may lead to falsely low results. Testing should be performed immediately  prior to Metamizole dosing.   COMPREHENSIVE METABOLIC PANEL - Abnormal    Glucose 118 (*)     Sodium 138      Potassium 3.5      Chloride 102      Bicarbonate 27      Anion Gap 13      Urea Nitrogen 16      Creatinine 0.71      eGFR >90      Calcium 9.6      Albumin 4.4      Alkaline Phosphatase 83      Total Protein 7.5      AST 17      Bilirubin, Total 0.4      ALT 12     ALCOHOL - Normal    Alcohol <10     LACTATE - Normal    Lactate 1.4      Narrative:     Venipuncture immediately after or during the administration of Metamizole may lead to falsely low results. Testing should be performed immediately  prior " to Metamizole dosing.   SARS-COV-2 PCR, SCREEN ASYMPTOMATIC - Normal    Coronavirus 2019, PCR Not Detected      Narrative:     This assay has received FDA Emergency Use Authorization (EUA) and is only authorized for the duration of time that circumstances exist to justify the authorization of the emergency use of in vitro diagnostic tests for the detection of SARS-CoV-2 virus and/or diagnosis of COVID-19 infection under section 564(b)(1) of the Act, 21 U.S.C. 360bbb-3(b)(1). This assay is an in vitro diagnostic nucleic acid amplification test for the qualitative detection of SARS-CoV-2 from nasopharyngeal specimens and has been validated for use at [facility]. Negative results do not preclude COVID-19 infections and should not be used as the sole basis for diagnosis, treatment, or other management decisions.     CBC WITH AUTO DIFFERENTIAL    WBC 8.0      nRBC 0.0      RBC 5.25      Hemoglobin 16.0      Hematocrit 46.7      MCV 89      MCH 30.5      MCHC 34.3      RDW 13.1      Platelets 244      Neutrophils % 62.2      Immature Granulocytes %, Automated 0.2      Lymphocytes % 28.8      Monocytes % 4.0      Eosinophils % 4.2      Basophils % 0.6      Neutrophils Absolute 4.97      Immature Granulocytes Absolute, Automated 0.02      Lymphocytes Absolute 2.31      Monocytes Absolute 0.32      Eosinophils Absolute 0.34      Basophils Absolute 0.05     DRUG SCREEN,URINE   URINALYSIS WITH REFLEX MICROSCOPIC                    Assessment/Plan   Active Problems:    Paranoid schizophrenia (CMS/HCC)    Methamphetamine use (CMS/HCC)    Patient presented to the emergency department with the above history and physical.  He was medically cleared by Dr. Harris and EPAT consultation was obtained.  Following their evaluation it was determined the patient does not meet criteria for inpatient treatment.  They are reaching out to appropriate facilities to try to find placement.  Patient will be continued to  receive services in the emergency department pending bed availability and transportation.       I spent 0 minutes in the critical care of this patient.      Julia Toro MD

## 2024-01-05 ENCOUNTER — HOSPITAL ENCOUNTER (EMERGENCY)
Facility: HOSPITAL | Age: 39
Discharge: HOME | End: 2024-01-05
Attending: EMERGENCY MEDICINE
Payer: MEDICARE

## 2024-01-05 VITALS
RESPIRATION RATE: 18 BRPM | WEIGHT: 200 LBS | DIASTOLIC BLOOD PRESSURE: 87 MMHG | HEART RATE: 76 BPM | TEMPERATURE: 96.7 F | OXYGEN SATURATION: 100 % | HEIGHT: 72 IN | BODY MASS INDEX: 27.09 KG/M2 | SYSTOLIC BLOOD PRESSURE: 131 MMHG

## 2024-01-05 DIAGNOSIS — F19.10 DRUG ABUSE (MULTI): Primary | ICD-10-CM

## 2024-01-05 PROCEDURE — 99283 EMERGENCY DEPT VISIT LOW MDM: CPT | Mod: 25

## 2024-01-05 PROCEDURE — 96372 THER/PROPH/DIAG INJ SC/IM: CPT | Performed by: EMERGENCY MEDICINE

## 2024-01-05 PROCEDURE — 2500000004 HC RX 250 GENERAL PHARMACY W/ HCPCS (ALT 636 FOR OP/ED): Mod: SE

## 2024-01-05 PROCEDURE — 96372 THER/PROPH/DIAG INJ SC/IM: CPT

## 2024-01-05 PROCEDURE — 99284 EMERGENCY DEPT VISIT MOD MDM: CPT | Performed by: EMERGENCY MEDICINE

## 2024-01-05 PROCEDURE — 2500000004 HC RX 250 GENERAL PHARMACY W/ HCPCS (ALT 636 FOR OP/ED): Mod: SE | Performed by: EMERGENCY MEDICINE

## 2024-01-05 RX ORDER — OLANZAPINE 10 MG/2ML
10 INJECTION, POWDER, FOR SOLUTION INTRAMUSCULAR ONCE
Status: DISCONTINUED | OUTPATIENT
Start: 2024-01-05 | End: 2024-01-05 | Stop reason: HOSPADM

## 2024-01-05 RX ORDER — NALOXONE HYDROCHLORIDE 1 MG/ML
INJECTION INTRAMUSCULAR; INTRAVENOUS; SUBCUTANEOUS
Status: COMPLETED
Start: 2024-01-05 | End: 2024-01-05

## 2024-01-05 RX ORDER — NALOXONE HYDROCHLORIDE 0.4 MG/ML
INJECTION, SOLUTION INTRAMUSCULAR; INTRAVENOUS; SUBCUTANEOUS
Status: DISCONTINUED
Start: 2024-01-05 | End: 2024-01-05 | Stop reason: WASHOUT

## 2024-01-05 RX ORDER — NALOXONE HYDROCHLORIDE 1 MG/ML
2 INJECTION INTRAMUSCULAR; INTRAVENOUS; SUBCUTANEOUS ONCE
Status: COMPLETED | OUTPATIENT
Start: 2024-01-05 | End: 2024-01-05

## 2024-01-05 RX ADMIN — NALOXONE HYDROCHLORIDE 2 MG: 1 INJECTION INTRAMUSCULAR; INTRAVENOUS; SUBCUTANEOUS at 17:01

## 2024-01-05 RX ADMIN — NALOXONE HYDROCHLORIDE 2 MG: 1 INJECTION PARENTERAL at 17:01

## 2024-01-05 ASSESSMENT — PAIN SCALES - WONG BAKER: WONGBAKER_NUMERICALRESPONSE: NO HURT

## 2024-01-05 ASSESSMENT — PAIN - FUNCTIONAL ASSESSMENT: PAIN_FUNCTIONAL_ASSESSMENT: 0-10

## 2024-01-05 ASSESSMENT — PAIN SCALES - GENERAL
PAINLEVEL_OUTOF10: 0 - NO PAIN

## 2024-01-05 ASSESSMENT — PAIN DESCRIPTION - PROGRESSION: CLINICAL_PROGRESSION: NOT CHANGED

## 2024-01-05 NOTE — ED PROVIDER NOTES
HPI   No chief complaint on file.      Patient presents via EMS after being found on the side of the road completely confused.  Apparently the patient does visit the ER due to drug abuse and history of psychiatric issues.    Review of systems are unobtainable due to patient's mental status    PHYSICAL EXAM:    GENERAL: Patient is just moaning and not answering questions    EYES: EOMI. Anicteric    HEENT: Moist mucous membranes. No scleral icterus. No cervical lymphadenopathy    LUNGS: Clear to auscultation bilaterally. No accessory muscle use    CARDIOVASCULAR: Regular rate and rhythm. No murmur. No JVD    ABDOMEN: Soft, non-tender and non-distended. No palpable masses    EXTREMITIES: No edema. Non-tender    SKIN: No rashes or lesions    NEUROLOGIC: No focal neurological deficits. CN II-XII grossly intact    PSYCHIATRIC: Appears agitated      Medical Decision Making:    Differential Diagnosis: Drug abuse    Clinical Laboratory Review:    Imaging Review:    Discussion with Consulting Physician:    Treatment Plan: Responded to 2 mg of IM Narcan immediately.  Patient then was acting normally but appeared to be depressed.  He will be discharged home at this time    Follow Up:  Follow up with your primary care physician as soon as possible    Return Precautions:  Return to the emergency department if symptoms worsen at any time                          No data recorded                Patient History   Past Medical History:   Diagnosis Date    Drug use     Schizophrenia (CMS/HCC)      No past surgical history on file.  No family history on file.  Social History     Tobacco Use    Smoking status: Every Day     Types: Cigarettes    Smokeless tobacco: Never   Vaping Use    Vaping Use: Some days   Substance Use Topics    Alcohol use: Not on file    Drug use: Yes     Types: Methamphetamines, Marijuana       Physical Exam   ED Triage Vitals   Temp Pulse Resp BP   -- -- -- --      SpO2 Temp src Heart Rate Source Patient Position    -- -- -- --      BP Location FiO2 (%)     -- --       Physical Exam    ED Course & MDM   Diagnoses as of 01/05/24 1805   Drug abuse (CMS/[facility])       Medical Decision Making      Procedure  Procedures    Diagnoses as of 01/05/24 1808   Drug abuse (CMS/[facility])          Callie Gaytan DO  01/05/24 1808

## 2024-01-07 LAB
ATRIAL RATE: 83 BPM
P AXIS: 75 DEGREES
P OFFSET: 189 MS
P ONSET: 137 MS
PR INTERVAL: 158 MS
Q ONSET: 216 MS
QRS COUNT: 13 BEATS
QRS DURATION: 98 MS
QT INTERVAL: 376 MS
QTC CALCULATION(BAZETT): 441 MS
QTC FREDERICIA: 419 MS
R AXIS: 75 DEGREES
T AXIS: 75 DEGREES
T OFFSET: 404 MS
VENTRICULAR RATE: 83 BPM

## 2024-04-13 ENCOUNTER — APPOINTMENT (OUTPATIENT)
Dept: CARDIOLOGY | Facility: HOSPITAL | Age: 39
End: 2024-04-13
Payer: MEDICARE

## 2024-04-13 ENCOUNTER — HOSPITAL ENCOUNTER (EMERGENCY)
Facility: HOSPITAL | Age: 39
Discharge: PSYCHIATRIC OR SUBSTANCE USE FACILITY | End: 2024-04-15
Attending: STUDENT IN AN ORGANIZED HEALTH CARE EDUCATION/TRAINING PROGRAM
Payer: MEDICARE

## 2024-04-13 DIAGNOSIS — R45.851 SUICIDAL IDEATION: Primary | ICD-10-CM

## 2024-04-13 LAB
ALBUMIN SERPL BCP-MCNC: 4.1 G/DL (ref 3.4–5)
ALP SERPL-CCNC: 74 U/L (ref 33–120)
ALT SERPL W P-5'-P-CCNC: 20 U/L (ref 10–52)
ANION GAP SERPL CALC-SCNC: 8 MMOL/L (ref 10–20)
APAP SERPL-MCNC: <10 UG/ML
AST SERPL W P-5'-P-CCNC: 22 U/L (ref 9–39)
BASOPHILS # BLD AUTO: 0.05 X10*3/UL (ref 0–0.1)
BASOPHILS NFR BLD AUTO: 0.8 %
BILIRUB SERPL-MCNC: 0.5 MG/DL (ref 0–1.2)
BUN SERPL-MCNC: 18 MG/DL (ref 6–23)
CALCIUM SERPL-MCNC: 8.8 MG/DL (ref 8.6–10.3)
CHLORIDE SERPL-SCNC: 106 MMOL/L (ref 98–107)
CO2 SERPL-SCNC: 30 MMOL/L (ref 21–32)
CREAT SERPL-MCNC: 1 MG/DL (ref 0.5–1.3)
EGFRCR SERPLBLD CKD-EPI 2021: >90 ML/MIN/1.73M*2
EOSINOPHIL # BLD AUTO: 0.42 X10*3/UL (ref 0–0.7)
EOSINOPHIL NFR BLD AUTO: 6.5 %
ERYTHROCYTE [DISTWIDTH] IN BLOOD BY AUTOMATED COUNT: 13.2 % (ref 11.5–14.5)
ETHANOL SERPL-MCNC: <10 MG/DL
FLUAV RNA RESP QL NAA+PROBE: NOT DETECTED
FLUBV RNA RESP QL NAA+PROBE: NOT DETECTED
GLUCOSE SERPL-MCNC: 80 MG/DL (ref 74–99)
HCT VFR BLD AUTO: 50.2 % (ref 41–52)
HGB BLD-MCNC: 16.7 G/DL (ref 13.5–17.5)
HOLD SPECIMEN: NORMAL
IMM GRANULOCYTES # BLD AUTO: 0.01 X10*3/UL (ref 0–0.7)
IMM GRANULOCYTES NFR BLD AUTO: 0.2 % (ref 0–0.9)
LYMPHOCYTES # BLD AUTO: 1.62 X10*3/UL (ref 1.2–4.8)
LYMPHOCYTES NFR BLD AUTO: 24.9 %
MCH RBC QN AUTO: 30.6 PG (ref 26–34)
MCHC RBC AUTO-ENTMCNC: 33.3 G/DL (ref 32–36)
MCV RBC AUTO: 92 FL (ref 80–100)
MONOCYTES # BLD AUTO: 0.35 X10*3/UL (ref 0.1–1)
MONOCYTES NFR BLD AUTO: 5.4 %
NEUTROPHILS # BLD AUTO: 4.05 X10*3/UL (ref 1.2–7.7)
NEUTROPHILS NFR BLD AUTO: 62.2 %
NRBC BLD-RTO: 0 /100 WBCS (ref 0–0)
PLATELET # BLD AUTO: 258 X10*3/UL (ref 150–450)
POTASSIUM SERPL-SCNC: 3.6 MMOL/L (ref 3.5–5.3)
PROT SERPL-MCNC: 6.8 G/DL (ref 6.4–8.2)
RBC # BLD AUTO: 5.46 X10*6/UL (ref 4.5–5.9)
SALICYLATES SERPL-MCNC: <3 MG/DL
SARS-COV-2 RNA RESP QL NAA+PROBE: NOT DETECTED
SODIUM SERPL-SCNC: 140 MMOL/L (ref 136–145)
WBC # BLD AUTO: 6.5 X10*3/UL (ref 4.4–11.3)

## 2024-04-13 PROCEDURE — 96372 THER/PROPH/DIAG INJ SC/IM: CPT

## 2024-04-13 PROCEDURE — 36415 COLL VENOUS BLD VENIPUNCTURE: CPT | Performed by: STUDENT IN AN ORGANIZED HEALTH CARE EDUCATION/TRAINING PROGRAM

## 2024-04-13 PROCEDURE — 2500000004 HC RX 250 GENERAL PHARMACY W/ HCPCS (ALT 636 FOR OP/ED)

## 2024-04-13 PROCEDURE — 87636 SARSCOV2 & INF A&B AMP PRB: CPT | Performed by: STUDENT IN AN ORGANIZED HEALTH CARE EDUCATION/TRAINING PROGRAM

## 2024-04-13 PROCEDURE — 80143 DRUG ASSAY ACETAMINOPHEN: CPT | Performed by: STUDENT IN AN ORGANIZED HEALTH CARE EDUCATION/TRAINING PROGRAM

## 2024-04-13 PROCEDURE — 99285 EMERGENCY DEPT VISIT HI MDM: CPT

## 2024-04-13 PROCEDURE — 93005 ELECTROCARDIOGRAM TRACING: CPT

## 2024-04-13 PROCEDURE — 85025 COMPLETE CBC W/AUTO DIFF WBC: CPT | Performed by: STUDENT IN AN ORGANIZED HEALTH CARE EDUCATION/TRAINING PROGRAM

## 2024-04-13 PROCEDURE — 80053 COMPREHEN METABOLIC PANEL: CPT | Performed by: STUDENT IN AN ORGANIZED HEALTH CARE EDUCATION/TRAINING PROGRAM

## 2024-04-13 RX ORDER — MIDAZOLAM HYDROCHLORIDE 5 MG/ML
5 INJECTION INTRAMUSCULAR; INTRAVENOUS ONCE
Status: COMPLETED | OUTPATIENT
Start: 2024-04-13 | End: 2024-04-13

## 2024-04-13 RX ORDER — OLANZAPINE 10 MG/2ML
5 INJECTION, POWDER, FOR SOLUTION INTRAMUSCULAR ONCE
Status: COMPLETED | OUTPATIENT
Start: 2024-04-13 | End: 2024-04-13

## 2024-04-13 RX ORDER — MIDAZOLAM HYDROCHLORIDE 5 MG/ML
INJECTION INTRAMUSCULAR; INTRAVENOUS
Status: COMPLETED
Start: 2024-04-13 | End: 2024-04-13

## 2024-04-13 RX ORDER — OLANZAPINE 10 MG/2ML
INJECTION, POWDER, FOR SOLUTION INTRAMUSCULAR
Status: COMPLETED
Start: 2024-04-13 | End: 2024-04-13

## 2024-04-13 RX ADMIN — OLANZAPINE 5 MG: 10 INJECTION, POWDER, FOR SOLUTION INTRAMUSCULAR at 18:37

## 2024-04-13 RX ADMIN — MIDAZOLAM 5 MG: 5 INJECTION INTRAMUSCULAR; INTRAVENOUS at 18:37

## 2024-04-13 RX ADMIN — OLANZAPINE 5 MG: 10 INJECTION, POWDER, LYOPHILIZED, FOR SOLUTION INTRAMUSCULAR at 18:37

## 2024-04-13 RX ADMIN — MIDAZOLAM HYDROCHLORIDE 5 MG: 5 INJECTION INTRAMUSCULAR; INTRAVENOUS at 18:37

## 2024-04-13 SDOH — HEALTH STABILITY: MENTAL HEALTH: SLEEP PATTERN: RESTLESSNESS

## 2024-04-13 SDOH — HEALTH STABILITY: MENTAL HEALTH: BEHAVIORS/MOOD: RESTLESS;APPEARS INTOXICATED

## 2024-04-13 ASSESSMENT — PAIN SCALES - GENERAL: PAINLEVEL_OUTOF10: 0 - NO PAIN

## 2024-04-13 ASSESSMENT — COLUMBIA-SUICIDE SEVERITY RATING SCALE - C-SSRS
2. HAVE YOU ACTUALLY HAD ANY THOUGHTS OF KILLING YOURSELF?: NO
1. IN THE PAST MONTH, HAVE YOU WISHED YOU WERE DEAD OR WISHED YOU COULD GO TO SLEEP AND NOT WAKE UP?: NO
6. HAVE YOU EVER DONE ANYTHING, STARTED TO DO ANYTHING, OR PREPARED TO DO ANYTHING TO END YOUR LIFE?: NO

## 2024-04-13 ASSESSMENT — PAIN DESCRIPTION - PROGRESSION: CLINICAL_PROGRESSION: NOT CHANGED

## 2024-04-13 ASSESSMENT — PAIN - FUNCTIONAL ASSESSMENT: PAIN_FUNCTIONAL_ASSESSMENT: 0-10

## 2024-04-13 NOTE — ED PROVIDER NOTES
"Chief Complaint: intoxication  HPI: This is a 38-year-old male, brought to the emergency department by EMS after he was found laying on the ground of a [employer] parking lot.  Patient is a known polysubstance user, most commonly per EMS he uses methamphetamine.  And route, EMS states that he was laughing and elated, acting appropriately.  On arrival to the emergency department, he continues to repeat \"I do not want to live\"and \"I do not want to be here\" further history is unable to be obtained secondary to patient intoxication.    Past Medical History:   Diagnosis Date    Drug use     Schizophrenia (Multi)       History reviewed. No pertinent surgical history.    Physical Exam  Vitals and nursing note reviewed.   Constitutional:       General: He is not in acute distress.     Appearance: He is not toxic-appearing.      Comments: Pacing, repeating \"I do not want to be here\" and \"I do not want to live\" clinically intoxicated   HENT:      Head: Normocephalic and atraumatic.      Mouth/Throat:      Mouth: Mucous membranes are moist.   Eyes:      Extraocular Movements: Extraocular movements intact.      Pupils: Pupils are equal, round, and reactive to light.   Cardiovascular:      Rate and Rhythm: Normal rate.   Pulmonary:      Effort: Pulmonary effort is normal.   Abdominal:      General: Abdomen is flat.   Skin:     General: Skin is warm and dry.   Neurological:      General: No focal deficit present.          ED Course/MDM  Diagnoses as of 04/21/24 1105   Suicidal ideation       This is a 38 y.o. male brought to the emergency department by EMS for evaluation after he was found on the ground and not responding well.  Patient is a known polysubstance user by EMS, however there was no drug paraphernalia around the patient.  Patient started getting agitated and pacing, and so was brought to the emergency department. Further history if limited secondary to patient mental status. On physical exam, patient is clinically " intoxicated, is repeating I do not want to be here.  Lab work was overall grossly unremarkable.  There are no external signs of trauma, as such I do not feel that any imaging is indicated at this time.  Given that he was expressing suicidal ideation, EPAT was consulted, however evaluation is pending clinical sobriety.  Patient was signed out to the oncoming ED team pending clinical sobriety and EPAT evaluation.    Final Impression  1.  Intoxication  2.  Suicidal ideation  Disposition/Plan: Signout  Condition at disposition: Stable.     Elvira Salomon DO  Emergency Medicine Physician     Elvira Salomon DO  04/21/24 111

## 2024-04-13 NOTE — ED TRIAGE NOTES
"Pt to ED brought by [facility] keyshawn. Per squad, the patient was found outside in a [employer] parking lot speaking oddly and laughing to self, appearing to be \"tripping\". Unsure of what the patient has ingested/taken. Per [facility], patient is not suicidal or homicidal but was unable to walk by himself or care for himself, prompting squad to bring him in. Pt resting comfortably and cooperative  "

## 2024-04-14 ENCOUNTER — APPOINTMENT (OUTPATIENT)
Dept: RADIOLOGY | Facility: HOSPITAL | Age: 39
End: 2024-04-14
Payer: MEDICARE

## 2024-04-14 PROBLEM — R45.851 SUICIDAL IDEATION: Status: ACTIVE | Noted: 2024-04-14

## 2024-04-14 LAB
AMPHETAMINES UR QL SCN: ABNORMAL
BARBITURATES UR QL SCN: ABNORMAL
BENZODIAZ UR QL SCN: ABNORMAL
BZE UR QL SCN: ABNORMAL
CANNABINOIDS UR QL SCN: ABNORMAL
FENTANYL+NORFENTANYL UR QL SCN: ABNORMAL
METHADONE UR QL SCN: ABNORMAL
OPIATES UR QL SCN: ABNORMAL
OXYCODONE+OXYMORPHONE UR QL SCN: ABNORMAL
PCP UR QL SCN: ABNORMAL

## 2024-04-14 PROCEDURE — 96372 THER/PROPH/DIAG INJ SC/IM: CPT

## 2024-04-14 PROCEDURE — 80307 DRUG TEST PRSMV CHEM ANLYZR: CPT | Performed by: EMERGENCY MEDICINE

## 2024-04-14 PROCEDURE — 2500000004 HC RX 250 GENERAL PHARMACY W/ HCPCS (ALT 636 FOR OP/ED): Mod: JZ,TB

## 2024-04-14 RX ORDER — OLANZAPINE 10 MG/2ML
10 INJECTION, POWDER, FOR SOLUTION INTRAMUSCULAR ONCE
Status: COMPLETED | OUTPATIENT
Start: 2024-04-14 | End: 2024-04-14

## 2024-04-14 RX ORDER — OLANZAPINE 10 MG/2ML
INJECTION, POWDER, FOR SOLUTION INTRAMUSCULAR
Status: COMPLETED
Start: 2024-04-14 | End: 2024-04-14

## 2024-04-14 RX ADMIN — OLANZAPINE 10 MG: 10 INJECTION, POWDER, FOR SOLUTION INTRAMUSCULAR at 10:23

## 2024-04-14 RX ADMIN — OLANZAPINE 10 MG: 10 INJECTION, POWDER, FOR SOLUTION INTRAMUSCULAR at 20:09

## 2024-04-14 SDOH — HEALTH STABILITY: MENTAL HEALTH: ACTIVE SUICIDAL IDEATION WITH SOME INTENT TO ACT, WITHOUT SPECIFIC PLAN (PAST 1 MONTH): YES

## 2024-04-14 SDOH — HEALTH STABILITY: MENTAL HEALTH: NON-SPECIFIC ACTIVE SUICIDAL THOUGHTS (PAST 1 MONTH): YES

## 2024-04-14 SDOH — HEALTH STABILITY: MENTAL HEALTH: ANXIETY SYMPTOMS: NO PROBLEMS REPORTED OR OBSERVED.

## 2024-04-14 SDOH — HEALTH STABILITY: MENTAL HEALTH: WISH TO BE DEAD (PAST 1 MONTH): YES

## 2024-04-14 SDOH — HEALTH STABILITY: MENTAL HEALTH: SUICIDAL BEHAVIOR (LIFETIME): YES

## 2024-04-14 SDOH — HEALTH STABILITY: MENTAL HEALTH: DEPRESSION SYMPTOMS: FEELINGS OF HOPELESSESS;CRYING

## 2024-04-14 SDOH — ECONOMIC STABILITY: HOUSING INSECURITY: FEELS SAFE LIVING IN HOME: NO

## 2024-04-14 SDOH — HEALTH STABILITY: MENTAL HEALTH: ACTIVE SUICIDAL IDEATION WITH SPECIFIC PLAN AND INTENT (PAST 1 MONTH): NO

## 2024-04-14 SDOH — HEALTH STABILITY: MENTAL HEALTH: SUICIDAL BEHAVIOR (3 MONTHS): NO

## 2024-04-14 ASSESSMENT — COLUMBIA-SUICIDE SEVERITY RATING SCALE - C-SSRS
6. HAVE YOU EVER DONE ANYTHING, STARTED TO DO ANYTHING, OR PREPARED TO DO ANYTHING TO END YOUR LIFE?: NO
1. SINCE LAST CONTACT, HAVE YOU WISHED YOU WERE DEAD OR WISHED YOU COULD GO TO SLEEP AND NOT WAKE UP?: YES
2. HAVE YOU ACTUALLY HAD ANY THOUGHTS OF KILLING YOURSELF?: NO

## 2024-04-14 ASSESSMENT — LIFESTYLE VARIABLES
PRESCIPTION_ABUSE_PAST_12_MONTHS: NO
SUBSTANCE_ABUSE_PAST_12_MONTHS: NO

## 2024-04-14 NOTE — PROGRESS NOTES
EPAT - Social Work Psychiatric Assessment    Arrival Details  Mode of Arrival: Ambulance  Admission Source: Home  Admission Type: Involuntary  EPAT Assessment Start Date: 04/14/24  EPAT Assessment Start Time: 0045  Name of : STEVE Mauro LISW    History of Present Illness  Admission Reason: Suicidal, psychosis    HPI: Pt, who is a 38 year old male, presents to the [place] ED with a chief complaint of suicidal ideation, bizarre behavior, and concern for psychosis. Pt is well known to EPAT service, frequently presenting to the ED under similar circumstances as today. Today, pt was reportedly found by bystanders lying in a [employer] parking lot. When approached, he was making bizarre and nonsensical statements. EMS was called to the scene. They documented that pt had admitted to taking an unknown substance. He continued to making nonsensical statements en route to the ED. In the ED, pt made repeated statements about how he wanted to die. Initial concern was for acute intoxication. Pt was given PRN medication for agitation and was given several hours to metabolize potentially intoxicating substances. Pt woke up around 00:40 and was noted to be shouting about wanting to leave and shouting that he wanted to die. EPAT was consulted for evaluation. BAL was negative but UDS was unavailable for review. “No risk” was noted on pt’s [place] risk screening tool in triage. For this assessment, pt’s affect is elevated but he is able to participate in assessment. He is disorganized and suicidal.         Pt has a past psychiatric history of schizoaffective disorder and polysubstance use (primarily stimulants). Pt has multiple prior psychiatric admissions for varying complaints including suicidal ideation, psychosis, and AMS. Pt’s last admission appears to be to [facility] in January, 2024 for psychosis. He was started on Abilify and encouraged to follow up with [facility]. Pt reported that he has not taken medications  since that admission; “[facility] and [facility] won’t give them to me!” Pt has not attended any [facility] appointments since August, 2023.         Pt reported that he currently resides with his brother. He reported that his brother and roommates “always call me a bitch! I don’t like that!” Pt was a poor historian and provided minimal information for completion of assessment.    SW Readmission Information   Readmission within 30 Days: No    Psychiatric Symptoms  Anxiety Symptoms: No problems reported or observed.  Depression Symptoms: Feelings of hopelessess, Crying  Josy Symptoms: Labile, Poor judgment, Pressured speech    Psychosis Symptoms  Hallucination Type: Auditory  Delusion Type: Paranoid (Believes people are talking about him)    Additional Symptoms - Adult  Generalized Anxiety Disorder: No problems reported or observed.  Obsessive Compulsive Disorder: No problems reported or observed.  Panic Attack: No problems reported or observed.  Post Traumatic Stress Disorder: No problems reported or observed.  Delirium: Waxing and waning    Past Psychiatric History/Meds/Treatments  Past Psychiatric History: Multiple, including [facility] 01/24,  [facility] 11/2-11/22/23; [facility] for 4-5 months until 8/2023; [facility] [place] 11/2022; [facility] 10/2022, 09/2022; [facility] 06/2022; [facility] 5/2022, 2/2022; [facility] 3x in 2021; [facility] 2021; [facility] 2/2021; [facility] [place] 6/2020; [facility] 5/2020, 1/2019, 9/2018; [facility] 2017; [facility] 2016.  Past Psychiatric Meds/Treatments: Abilify  Past Violence/Victimization History: History of aggression in the ED    Current Mental Health Contacts   Name/Phone Number: None   Last Appointment Date: N/A  Provider Name/Phone Number: None. History with   Provider Last Appointment Date: August, 2023    Support System: Immediate family    Living Arrangement: House    Home Safety  Feels Safe Living in Home: No  Home Safety : Believes people are talking  about him; appears delusional    Income Information  Employment Status for: Patient  Employment Status: Disabled  Income Source: Disability    [place] Service/Education History  Current or Previous  Service: None  Education Level:  (Did not assess)    Social/Cultural History  Social History: Pt is a 38 year old  male, history of SMI, CHERI, lives with brother  Cultural Requests During Hospitalization: None  Spiritual Requests During Hospitalization: None  Important Activities:  (Unable to assess)    Legal  Legal Considerations: Patient/ Family Ability to Make Healthcare Decisions  Assistance with Managing/Advocating Healthcare Needs:  (Self)  Criminal Activity/ Legal Involvement Pertinent to Current Situation/ Hospitalization: none  Legal Concerns: Per chart- hx of DUI, Assault and Aggravated Disorderly Conduct charges, with 9 months in [facility] in 2008.    Drug Screening  Have you used any substances (canabis, cocaine, heroin, hallucinogens, inhalants, etc.) in the past 12 months?: No  Have you used any prescription drugs other than prescribed in the past 12 months?: No  Is a toxicology screen needed?: Yes    Stage of Change  Stage of Change: Precontemplation  History of Treatment: Inpatient, Dual  Type of Treatment Offered: Inpatient  Treatment Offered: Declined  Duration of Substance Use: Years  Frequency of Substance Use: When able  Age of First Substance Use: Did not assess    Psychosocial  Behaviors/Mood: Anxious, Cooperative, Crying, Irritable, Labile, Sad, Paranoid    Orientation  Orientation Level: Oriented X4    General Appearance  Motor Activity: Restlessness  Speech Pattern: Excessively loud, Rapid, Repetitive, Pressured  General Attitude: Cooperative  Appearance/Hygiene: Disheveled, Poor hygiene, Body odor    Thought Process  Coherency: Disorganized  Content: Delusions  Delusions: Paranoid  Perception: Hallucinations  Hallucination: Auditory  Judgment/Insight: Impaired  Confusion:  Mild  Cognition: Poor judgement    Sleep Pattern  Sleep Pattern: Insomnia    Risk Factors  Self Harm/Suicidal Ideation Plan: Pt denied plan  Previous Self Harm/Suicidal Plans: Reported history of jumping from a bridge, laying on tracks  Risk Factors: Major mental illness, Male, Substance abuse    Violence Risk Assessment  Assessment of Violence: None noted  Thoughts of Harm to Others: No    Ability to Assess Risk Screen  Risk Screen - Ability to Assess: Able to be screened  [place] Suicide Severity Rating Scale (Screener/Recent Self-Report)  1. Wish to be Dead (Past 1 Month): Yes  2. Non-Specific Active Suicidal Thoughts (Past 1 Month): Yes  3. Active Suicidal Ideation with any Methods (Not Plan) Without Intent to Act (Past 1 Month): Yes  4. Active Suicidal Ideation with Some Intent to Act, Without Specific Plan (Past 1 Month): Yes  5. Active Suicidal Ideation with Specific Plan and Intent (Past 1 Month): No  6. Suicidal Behavior (Lifetime): Yes  6. Suicidal Behavior (3 Months): No  Calculated C-SSRS Risk Score (Lifetime/Recent): High Risk  Step 1: Risk Factors  Current & Past Psychiatric Dx: Mood disorder, Psychotic disorder, Alcohol/substance abuse disorders  Presenting Symptoms: Psychosis, Impulsivity, Hopelessness or despair  Precipitants/Stressors: Social isolation, Substance intoxication or withdrawal  Change in Treatment: Non-compliant or not receiving treatment  Access to Lethal Methods : No  Step 5: Documentation  Risk Level: Moderate suicide risk (Unable to participate in step 3. Repeatedly states he wants to die but has no plan for suicide. Risk upgraded to moderate.)    Psychiatric Impression and Plan of Care    Assessment and Plan: Pt is a 38 year old male presenting for psychiatric evaluation with a chief complaint of suicidal ideation and psychosis. Assessment conducted via telehealth. Prior to assessment, this writer could hear pt shouting nonsensically. ED RN held the IPAD and interview was  conducted. Pt appeared disheveled, unkempt, and unbathed. Pt was initially agitated and shouting but was able to quiet himself and participate in the interview. Pt was disorganized and provided a nonsensical answer regarding why he was in the ED today. Pt reported that people have been accusing him of being into men and being a woman “but it’s not true!” Pt repeatedly expressed his desire to be dead. He stated multiple times that he wants to die. Pt did state that he wants to kill himself but when asked about a plan, pt did not provide any. He denied homicidal ideation, adding “just myself! I just want to die!” Pt reported that he was upset because people are accusing him of being flaherty. Pt appears internally preoccupied and his affect is quite labile. He frequently goes on tangents and became upset with this writer when he thought this writer called him “sweetheart.” Pt was generally redirectable, however, and remained in behavioral control during the encounter. Pt would not answer questions about drug use but he added “It’s not about the drugs! Don’t even ask about what I used!” Pt admitted to auditory hallucinations stating “everything I think, everything I say; they are always commenting! I can’t take it!”         Dx: Schizoaffective disorder    r/o substance induced psychosis     history of polysubstance use        Plan: Pt meets criteria for inpatient psychiatric hospitalization because he poses an elevated risk of harm to himself.    Specific Resources Provided to Patient: Inpatient  CM Notified: -  PHP/IOP Recommended: -  Specific Information Provided for PHP/IOP: -  Plan Comments: -    Outcome/Disposition  Patient's Perception of Outcome Achieved: Agreeable  Assessment, Recommendations and Risk Level Reviewed with: Dr. Toro  Contact Name: -  Contact Number(s): -  Contact Relationship: -  EPAT Assessment Completed Date: 04/14/24  EPAT Assessment Completed Time: 0140

## 2024-04-14 NOTE — PROGRESS NOTES
"Alexander Zavala is a 38 y.o. male on day 0 of admission presenting with No Principal Problem: There is no principal problem currently on the Problem List. Please update the Problem List and refresh..  Reported amphetamine intoxication and suicidal ideation.  Patient was medically cleared by Dr. Salomon.  Please see her documentation for full details of the H&P.  Patient was signed out to me at 2023 for evaluation.  Subjective   Patient had been medicated with zyprexa and Versed prior to my shift starting; therefore patient was sleeping the entire beginning of shift.  He abruptly awakened at midnight and started shouting \"quit looking  at me.  I want to kill myself\"       Objective     Physical Exam  Vitals reviewed.   HENT:      Head: Normocephalic.   Neurological:      General: No focal deficit present.      Mental Status: He is alert.   Psychiatric:      Comments: Agitated, restless, shouting \"I want to kill myself\"         Last Recorded Vitals  Blood pressure 118/79, pulse 76, temperature 36.6 °C (97.9 °F), resp. rate 18, height 1.829 m (6'), weight 77.1 kg (170 lb), SpO2 98%.  Intake/Output last 3 Shifts:  No intake/output data recorded.    Relevant Results              Labs Reviewed   COMPREHENSIVE METABOLIC PANEL - Abnormal       Result Value    Glucose 80      Sodium 140      Potassium 3.6      Chloride 106      Bicarbonate 30      Anion Gap 8 (*)     Urea Nitrogen 18      Creatinine 1.00      eGFR >90      Calcium 8.8      Albumin 4.1      Alkaline Phosphatase 74      Total Protein 6.8      AST 22      Bilirubin, Total 0.5      ALT 20     ACUTE TOXICOLOGY PANEL, BLOOD - Normal    Acetaminophen <10.0      Salicylate  <3      Alcohol <10     SARS-COV-2 AND INFLUENZA A/B PCR - Normal    Flu A Result Not Detected      Flu B Result Not Detected      Coronavirus 2019, PCR Not Detected      Narrative:     This assay has received FDA Emergency Use Authorization (EUA) and  is only authorized for the duration of time " that circumstances exist to justify the authorization of the emergency use of in vitro diagnostic tests for the detection of SARS-CoV-2 virus and/or diagnosis of COVID-19 infection under section 564(b)(1) of the Act, 21 U.S.C. 360bbb-3(b)(1). Testing for SARS-CoV-2 is only recommended for patients who meet current clinical and/or epidemiological criteria as defined by federal, state, or local public health directives. This assay is an in vitro diagnostic nucleic acid amplification test for the qualitative detection of SARS-CoV-2, Influenza A, and Influenza B from nasopharyngeal specimens and has been validated for use at [facility]. Negative results do not preclude COVID-19 infections or Influenza A/B infections, and should not be used as the sole basis for diagnosis, treatment, or other management decisions. If Influenza A/B and RSV PCR results are negative, testing for Parainfluenza virus, Adenovirus and Metapneumovirus is routinely performed for [facility] inpatients, and is available on other patients by placing an add-on request.    CBC WITH AUTO DIFFERENTIAL    WBC 6.5      nRBC 0.0      RBC 5.46      Hemoglobin 16.7      Hematocrit 50.2      MCV 92      MCH 30.6      MCHC 33.3      RDW 13.2      Platelets 258      Neutrophils % 62.2      Immature Granulocytes %, Automated 0.2      Lymphocytes % 24.9      Monocytes % 5.4      Eosinophils % 6.5      Basophils % 0.8      Neutrophils Absolute 4.05      Immature Granulocytes Absolute, Automated 0.01      Lymphocytes Absolute 1.62      Monocytes Absolute 0.35      Eosinophils Absolute 0.42      Basophils Absolute 0.05     GRAY TOP    Extra Tube Hold for add-ons.     DRUG SCREEN,URINE     EKG  1915 --twelve-lead EKG was obtained and read by me. This demonstrates arrhythmia with a rate of 70, normal intervals, normal axes,, no ischemia, no pericarditis. There was no change compared to most recent prior EKG.  1/1/24                 Assessment/Plan   Active Problems:  There are no active Hospital Problems.    Patient presented to the emergency department from the community after being found in the charge parking lot apparently intoxicated.  Patient was medicated prior to start of my shift due to agitation and aggression.  He awakened screaming and making suicidal statements again.  Patient was able to be de-escalated verbally and did not require additional medication.  Patient has been signed out to me pending EPAT consultation.  Following this consultation it was determined the patient does meet criteria for hospitalization.  We will continue patient under safe supervision pending bed placement and transportation.       I spent 0 minutes in the critical care of this patient.      Julia Toro MD

## 2024-04-14 NOTE — ED PROVIDER NOTES
"HPI   Chief Complaint   Patient presents with    Acute Intoxication     Per squad, \"tripping\" on unknown substance       Patient signed out to me at 0800 hrs. by Dr. Toro.  Awaiting urine drug screen for patient to be placed.  The patient did become explosive and agitated 1 time throughout the day and received a dose of Zyprexa.  Patient did eat dinner.  Patient patient has been calmly resting the rest of the day.  Patient did receive an accepting facility which is [facility].  Accepting physician is Dr. Dempsey.  EMTALA form completed.  Patient will not have a bed until tomorrow morning.    Reviewed all vital signs which have remained normal                          [place] Coma Scale Score: 15                     Patient History   Past Medical History:   Diagnosis Date    Drug use     Schizophrenia (Multi)      History reviewed. No pertinent surgical history.  No family history on file.  Social History     Tobacco Use    Smoking status: Every Day     Types: Cigarettes    Smokeless tobacco: Never   Vaping Use    Vaping status: Some Days   Substance Use Topics    Alcohol use: Not on file    Drug use: Yes     Types: Methamphetamines, Marijuana       Physical Exam   ED Triage Vitals [04/13/24 1758]   Temperature Heart Rate Respirations BP   36.6 °C (97.9 °F) 76 18 118/79      SpO2 Temp src Heart Rate Source Patient Position   98 % -- Monitor --      BP Location FiO2 (%)     -- --       Physical Exam    ED Course & MDM   Diagnoses as of 04/14/24 1738   Suicidal ideation       Medical Decision Making      Procedure  Procedures     Callie Gaytan, DO  04/14/24 1741    "

## 2024-04-14 NOTE — SIGNIFICANT EVENT
Application for Emergency Admission      Ready for Transfer?  Is the patient medically cleared for transfer to inpatient psychiatry: Yes  Has the patient been accepted to an inpatient psychiatric hospital: Yes    Application for Emergency Admission  IN ACCORDANCE WITH [place]122[place]  The Chief Clinical Officer of:  [facility]  4/15/2024 .12:41 AM    Reason for Hospitalization  The undersigned has reason to believe that: Alexander Zavala Is a mentally ill person subject to hospitalization by court order under division B Section 5122.01 of the Revised Code, i.e., this person:    1.Yes  Represents a substantial risk of physical harm to self as manifested by evidence of threats of, or attempts at, suicide or serious self-inflicted bodily harm    2.No Represents a substantial risk of physical harm to others as manifested by evidence of recent homicidal or other violent behavior, evidence of recent threats that place another in reasonable fear of violent behavior and serious physical harm, or other evidence of present dangerousness    3.No Represents a substantial and immediate risk of serious physical impairment or injury to self as manifested by  evidence that the person is unable to provide for and is not providing for the person's basic physical needs because of the person's mental illness and that appropriate provision for those needs cannot be made  immediately available in the community    4.Yes Would benefit from treatment in a hospital for his mental illness and is in need of such treatment as manifested by evidence of behavior that creates a grave and imminent risk to substantial rights of others or  himself.    5.No Would benefit from treatment as manifested by evidence of behavior that indicates all of the following:       (a) The person is unlikely to survive safely in the community without supervision, based on a clinical determination.       (b) The person has a history of lack of compliance with  treatment for mental illness and one of the following applies:      (i) At least twice within the thirty-six months prior to the filing of an affidavit seeking court-ordered treatment of the person under section 5122.111 of the Revised Code, the lack of compliance has been a significant factor in necessitating hospitalization in a hospital or receipt of services in a forensic or other mental health unit of a correctional facility, provided that the thirty-six-month period shall be extended by the length of any hospitalization or incarceration of the person that occurred within the thirty-six-month period.      (ii) Within the forty-eight months prior to the filing of an affidavit seeking court-ordered treatment of the person under section 5122.111 of the Revised Code, the lack of compliance resulted in one or more acts of serious violent behavior toward self or others or threats of, or attempts at, serious physical harm to self or others, provided that the forty-eight-month period shall be extended by the length of any hospitalization or incarceration of the person that occurred within the forty-eight-month period.      (c) The person, as a result of mental illness, is unlikely to voluntarily participate in necessary treatment.       (d) In view of the person's treatment history and current behavior, the person is in need of treatment in order to prevent a relapse or deterioration that would be likely to result in substantial risk of serious harm to the person or others.    (e) Represents a substantial risk of physical harm to self or others if allowed to remain at liberty pending examination.    Therefore, it is requested that said person be admitted to the above named facility.    STATEMENT OF BELIEF    Must be filled out by one of the following: a psychiatrist, licensed physician, licensed clinical psychologist, health or ,  or .  (Statement shall include the circumstances under  "which the individual was taken into custody and the reason for the person's belief that hospitalization is necessary. The statement shall also include a reference to efforts made to secure the individual's property at his residence if he was taken into custody there. Every reasonable and appropriate effort should be made to take this person into custody in the least conspicuous manner possible.)    Patient has a known history of mental illness (schizoaffective disorder).  He will not answer question is whether or not he is taking his medications.  He has not been cooperative with getting care.  He is loudly and repeatedly proclaiming \"I want to kill myself\".    Julia Toro MD 4/14/2024     _____________________________________________________________   Place of Employment:  [employer]    STATEMENT OF OBSERVATION BY PSYCHIATRIST, LICENSED PHYSICIAN, OR LICENSED CLINICAL PSYCHOLOGIST, IF APPLICABLE    Place of Observation (e.g., [facility] mental [facility] center, general hospital, office, emergency facility)  (If applicable, please complete)    Julia Toro MD 4/14/2024    _____________________________________________________________     "

## 2024-04-15 VITALS
DIASTOLIC BLOOD PRESSURE: 81 MMHG | SYSTOLIC BLOOD PRESSURE: 112 MMHG | WEIGHT: 170 LBS | HEIGHT: 72 IN | OXYGEN SATURATION: 99 % | RESPIRATION RATE: 16 BRPM | TEMPERATURE: 97.9 F | BODY MASS INDEX: 23.03 KG/M2 | HEART RATE: 72 BPM

## 2024-04-15 LAB
ATRIAL RATE: 70 BPM
P AXIS: 72 DEGREES
P OFFSET: 199 MS
P ONSET: 142 MS
PR INTERVAL: 150 MS
Q ONSET: 217 MS
QRS COUNT: 12 BEATS
QRS DURATION: 100 MS
QT INTERVAL: 400 MS
QTC CALCULATION(BAZETT): 432 MS
QTC FREDERICIA: 421 MS
R AXIS: 75 DEGREES
T AXIS: 51 DEGREES
T OFFSET: 417 MS
VENTRICULAR RATE: 70 BPM

## 2024-04-15 NOTE — PROGRESS NOTES
Alexander Zavala is a 38 y.o. male on day 0 of admission presenting with No Principal Problem: There is no principal problem currently on the Problem List. Please update the Problem List and refresh..  This patient has a history of schizoaffective disorder and methamphetamine use.  He had presented to the emergency department with agitated behavior and suicidal ideation.  He had been evaluated by EPAT and determination was made that patient met criteria for inpatient evaluation.  Patient was signed out to me by Dr. Gaytan at change of shift stating that the patient had been accepted for psychiatric admission, but that medical transport was not available until 9 AM.  Please see her documentation for full details of this.  Subjective   Patient had been medicated with Zyprexa at change of shift due to yelling agitated behavior.  He has been sleeping throughout the entirety of my shift thus far.     0041 --I was updated by EPAT that the patient's hospital acceptance is at [facility] and have updated the patient's pink slip.  EPAT did confirm that patient's pickup time is still 9 AM.      Objective     Physical Exam  Vitals reviewed.   Constitutional:       Comments: sleeping   Cardiovascular:      Rate and Rhythm: Normal rate.   Pulmonary:      Effort: Pulmonary effort is normal.         Last Recorded Vitals  Blood pressure 118/79, pulse 76, temperature 36.6 °C (97.9 °F), resp. rate 18, height 1.829 m (6'), weight 77.1 kg (170 lb), SpO2 98%.  Intake/Output last 3 Shifts:  No intake/output data recorded.    Relevant Results                             Assessment/Plan   Active Problems:    Suicidal ideation    We will continue patient under safe supervision pending transport to [facility].       I spent 0 minutes in the critical care of this patient.      Julia Toro MD

## 2024-05-02 ENCOUNTER — HOSPITAL ENCOUNTER (EMERGENCY)
Facility: HOSPITAL | Age: 39
Discharge: HOME | End: 2024-05-03
Attending: EMERGENCY MEDICINE
Payer: MEDICARE

## 2024-05-02 DIAGNOSIS — F25.9 SCHIZOAFFECTIVE DISORDER, UNSPECIFIED TYPE (MULTI): Primary | ICD-10-CM

## 2024-05-02 DIAGNOSIS — R45.1 AGITATION: ICD-10-CM

## 2024-05-02 DIAGNOSIS — F15.10 METHAMPHETAMINE ABUSE (MULTI): ICD-10-CM

## 2024-05-02 PROCEDURE — 36415 COLL VENOUS BLD VENIPUNCTURE: CPT | Performed by: EMERGENCY MEDICINE

## 2024-05-02 PROCEDURE — 80143 DRUG ASSAY ACETAMINOPHEN: CPT | Performed by: EMERGENCY MEDICINE

## 2024-05-02 PROCEDURE — 80179 DRUG ASSAY SALICYLATE: CPT | Performed by: EMERGENCY MEDICINE

## 2024-05-02 PROCEDURE — 80053 COMPREHEN METABOLIC PANEL: CPT | Performed by: EMERGENCY MEDICINE

## 2024-05-02 PROCEDURE — 99284 EMERGENCY DEPT VISIT MOD MDM: CPT

## 2024-05-02 PROCEDURE — 2500000004 HC RX 250 GENERAL PHARMACY W/ HCPCS (ALT 636 FOR OP/ED)

## 2024-05-02 PROCEDURE — 82077 ASSAY SPEC XCP UR&BREATH IA: CPT | Performed by: EMERGENCY MEDICINE

## 2024-05-02 PROCEDURE — 96372 THER/PROPH/DIAG INJ SC/IM: CPT

## 2024-05-02 PROCEDURE — 85027 COMPLETE CBC AUTOMATED: CPT | Performed by: EMERGENCY MEDICINE

## 2024-05-02 RX ORDER — LORAZEPAM 2 MG/ML
2 INJECTION INTRAMUSCULAR ONCE
Status: COMPLETED | OUTPATIENT
Start: 2024-05-02 | End: 2024-05-02

## 2024-05-02 RX ORDER — HALOPERIDOL 5 MG/ML
10 INJECTION INTRAMUSCULAR ONCE
Status: COMPLETED | OUTPATIENT
Start: 2024-05-02 | End: 2024-05-02

## 2024-05-02 RX ORDER — DIPHENHYDRAMINE HYDROCHLORIDE 50 MG/ML
INJECTION INTRAMUSCULAR; INTRAVENOUS
Status: COMPLETED
Start: 2024-05-02 | End: 2024-05-02

## 2024-05-02 RX ORDER — DIPHENHYDRAMINE HYDROCHLORIDE 50 MG/ML
50 INJECTION INTRAMUSCULAR; INTRAVENOUS ONCE
Status: COMPLETED | OUTPATIENT
Start: 2024-05-02 | End: 2024-05-02

## 2024-05-02 RX ORDER — LORAZEPAM 2 MG/ML
INJECTION INTRAMUSCULAR
Status: COMPLETED
Start: 2024-05-02 | End: 2024-05-02

## 2024-05-02 RX ORDER — HALOPERIDOL 5 MG/ML
INJECTION INTRAMUSCULAR
Status: COMPLETED
Start: 2024-05-02 | End: 2024-05-02

## 2024-05-02 RX ADMIN — LORAZEPAM 2 MG: 2 INJECTION INTRAMUSCULAR at 22:39

## 2024-05-02 RX ADMIN — HALOPERIDOL LACTATE 10 MG: 5 INJECTION, SOLUTION INTRAMUSCULAR at 22:39

## 2024-05-02 RX ADMIN — DIPHENHYDRAMINE HYDROCHLORIDE 50 MG: 50 INJECTION, SOLUTION INTRAMUSCULAR; INTRAVENOUS at 22:40

## 2024-05-02 RX ADMIN — LORAZEPAM 2 MG: 2 INJECTION INTRAMUSCULAR; INTRAVENOUS at 22:39

## 2024-05-02 RX ADMIN — DIPHENHYDRAMINE HYDROCHLORIDE 50 MG: 50 INJECTION INTRAMUSCULAR; INTRAVENOUS at 22:40

## 2024-05-02 RX ADMIN — HALOPERIDOL 10 MG: 5 INJECTION INTRAMUSCULAR at 22:39

## 2024-05-02 SDOH — HEALTH STABILITY: MENTAL HEALTH: BEHAVIORS/MOOD: AGITATED;ANXIOUS;HYPER-VERBAL

## 2024-05-02 ASSESSMENT — PAIN SCALES - GENERAL: PAINLEVEL_OUTOF10: 0 - NO PAIN

## 2024-05-02 ASSESSMENT — COLUMBIA-SUICIDE SEVERITY RATING SCALE - C-SSRS
6. HAVE YOU EVER DONE ANYTHING, STARTED TO DO ANYTHING, OR PREPARED TO DO ANYTHING TO END YOUR LIFE?: NO
2. HAVE YOU ACTUALLY HAD ANY THOUGHTS OF KILLING YOURSELF?: NO
1. IN THE PAST MONTH, HAVE YOU WISHED YOU WERE DEAD OR WISHED YOU COULD GO TO SLEEP AND NOT WAKE UP?: YES
6. HAVE YOU EVER DONE ANYTHING, STARTED TO DO ANYTHING, OR PREPARED TO DO ANYTHING TO END YOUR LIFE?: YES

## 2024-05-02 ASSESSMENT — PAIN - FUNCTIONAL ASSESSMENT: PAIN_FUNCTIONAL_ASSESSMENT: 0-10

## 2024-05-03 ENCOUNTER — APPOINTMENT (OUTPATIENT)
Dept: CARDIOLOGY | Facility: HOSPITAL | Age: 39
End: 2024-05-03
Payer: MEDICARE

## 2024-05-03 VITALS
SYSTOLIC BLOOD PRESSURE: 128 MMHG | WEIGHT: 169.75 LBS | RESPIRATION RATE: 18 BRPM | HEIGHT: 71 IN | HEART RATE: 78 BPM | OXYGEN SATURATION: 97 % | TEMPERATURE: 99.3 F | DIASTOLIC BLOOD PRESSURE: 70 MMHG | BODY MASS INDEX: 23.77 KG/M2

## 2024-05-03 LAB
ALBUMIN SERPL BCP-MCNC: 3.8 G/DL (ref 3.4–5)
ALP SERPL-CCNC: 60 U/L (ref 33–120)
ALT SERPL W P-5'-P-CCNC: 18 U/L (ref 10–52)
AMPHETAMINES UR QL SCN: ABNORMAL
ANION GAP SERPL CALC-SCNC: 7 MMOL/L (ref 10–20)
APAP SERPL-MCNC: <10 UG/ML
APPEARANCE UR: CLEAR
AST SERPL W P-5'-P-CCNC: 21 U/L (ref 9–39)
ATRIAL RATE: 84 BPM
BARBITURATES UR QL SCN: ABNORMAL
BENZODIAZ UR QL SCN: ABNORMAL
BILIRUB SERPL-MCNC: 0.4 MG/DL (ref 0–1.2)
BILIRUB UR STRIP.AUTO-MCNC: NEGATIVE MG/DL
BUN SERPL-MCNC: 19 MG/DL (ref 6–23)
BZE UR QL SCN: ABNORMAL
CALCIUM SERPL-MCNC: 8.5 MG/DL (ref 8.6–10.3)
CANNABINOIDS UR QL SCN: ABNORMAL
CHLORIDE SERPL-SCNC: 104 MMOL/L (ref 98–107)
CO2 SERPL-SCNC: 28 MMOL/L (ref 21–32)
COLOR UR: ABNORMAL
CREAT SERPL-MCNC: 0.73 MG/DL (ref 0.5–1.3)
EGFRCR SERPLBLD CKD-EPI 2021: >90 ML/MIN/1.73M*2
ERYTHROCYTE [DISTWIDTH] IN BLOOD BY AUTOMATED COUNT: 12.9 % (ref 11.5–14.5)
ETHANOL SERPL-MCNC: <10 MG/DL
FENTANYL+NORFENTANYL UR QL SCN: ABNORMAL
GLUCOSE SERPL-MCNC: 98 MG/DL (ref 74–99)
GLUCOSE UR STRIP.AUTO-MCNC: NEGATIVE MG/DL
HCT VFR BLD AUTO: 46.3 % (ref 41–52)
HGB BLD-MCNC: 15.3 G/DL (ref 13.5–17.5)
HOLD SPECIMEN: NORMAL
KETONES UR STRIP.AUTO-MCNC: NEGATIVE MG/DL
LEUKOCYTE ESTERASE UR QL STRIP.AUTO: NEGATIVE
MCH RBC QN AUTO: 30 PG (ref 26–34)
MCHC RBC AUTO-ENTMCNC: 33 G/DL (ref 32–36)
MCV RBC AUTO: 91 FL (ref 80–100)
METHADONE UR QL SCN: ABNORMAL
MUCOUS THREADS #/AREA URNS AUTO: ABNORMAL /LPF
NITRITE UR QL STRIP.AUTO: NEGATIVE
NRBC BLD-RTO: 0 /100 WBCS (ref 0–0)
OPIATES UR QL SCN: ABNORMAL
OXYCODONE+OXYMORPHONE UR QL SCN: ABNORMAL
P AXIS: 65 DEGREES
P OFFSET: 196 MS
P ONSET: 145 MS
PCP UR QL SCN: ABNORMAL
PH UR STRIP.AUTO: 6 [PH]
PLATELET # BLD AUTO: 247 X10*3/UL (ref 150–450)
POTASSIUM SERPL-SCNC: 3.6 MMOL/L (ref 3.5–5.3)
PR INTERVAL: 146 MS
PROT SERPL-MCNC: 6.5 G/DL (ref 6.4–8.2)
PROT UR STRIP.AUTO-MCNC: ABNORMAL MG/DL
Q ONSET: 218 MS
QRS COUNT: 13 BEATS
QRS DURATION: 92 MS
QT INTERVAL: 410 MS
QTC CALCULATION(BAZETT): 484 MS
QTC FREDERICIA: 458 MS
R AXIS: 58 DEGREES
RBC # BLD AUTO: 5.1 X10*6/UL (ref 4.5–5.9)
RBC # UR STRIP.AUTO: NEGATIVE /UL
RBC #/AREA URNS AUTO: ABNORMAL /HPF
SALICYLATES SERPL-MCNC: <3 MG/DL
SODIUM SERPL-SCNC: 135 MMOL/L (ref 136–145)
SP GR UR STRIP.AUTO: 1.03
SQUAMOUS #/AREA URNS AUTO: ABNORMAL /HPF
T AXIS: 53 DEGREES
T OFFSET: 423 MS
UROBILINOGEN UR STRIP.AUTO-MCNC: 4 MG/DL
VENTRICULAR RATE: 84 BPM
WBC # BLD AUTO: 5.6 X10*3/UL (ref 4.4–11.3)
WBC #/AREA URNS AUTO: ABNORMAL /HPF

## 2024-05-03 PROCEDURE — 80307 DRUG TEST PRSMV CHEM ANLYZR: CPT | Performed by: EMERGENCY MEDICINE

## 2024-05-03 PROCEDURE — 81001 URINALYSIS AUTO W/SCOPE: CPT | Mod: 59 | Performed by: EMERGENCY MEDICINE

## 2024-05-03 PROCEDURE — 93005 ELECTROCARDIOGRAM TRACING: CPT

## 2024-05-03 PROCEDURE — 99213 OFFICE O/P EST LOW 20 MIN: CPT

## 2024-05-03 NOTE — ED PROVIDER NOTES
"HPI   Chief Complaint   Patient presents with    Psychiatric Evaluation     Pt brought to ED by friend and house mate, Jeff Lewis [phone], for reports of screaming. Pt repeating \"I don't want to see anyone. I don't want to be around anyone. I don't want to live anymore.\" Pt placed in psych safe room and patient starts hitting walls and screaming. [facility] [employer] present.        History is predominantly by a friend who accompanies the patient.  He says he has not been taking his medication for quite a while.  He does use methamphetamine and bath salts.  He comes in tonight very agitated and screaming and hitting the wall in his room.  He keeps insisting that he is hearing people talk to him or not there and wants to kill himself.                        [place] Coma Scale Score: 15                     Patient History   Past Medical History:   Diagnosis Date    Drug use     Schizophrenia (Multi)      History reviewed. No pertinent surgical history.  No family history on file.  Social History     Tobacco Use    Smoking status: Every Day     Types: Cigarettes    Smokeless tobacco: Never   Vaping Use    Vaping status: Some Days   Substance Use Topics    Alcohol use: Not on file    Drug use: Yes     Types: Methamphetamines, Marijuana       Physical Exam   ED Triage Vitals [05/02/24 2207]   Temperature Heart Rate Respirations BP   36.7 °C (98 °F) 99 20 148/89      SpO2 Temp Source Heart Rate Source Patient Position   99 % Temporal -- --      BP Location FiO2 (%)     -- --       Physical Exam  Vitals and nursing note reviewed.   Constitutional:       General: He is in acute distress.   HENT:      Head: Normocephalic and atraumatic.      Right Ear: Tympanic membrane and ear canal normal.      Left Ear: Tympanic membrane and ear canal normal.      Nose: Nose normal.      Mouth/Throat:      Mouth: Mucous membranes are moist.   Cardiovascular:      Rate and Rhythm: Normal rate.   Pulmonary:      Effort: Pulmonary effort is normal.    "   Breath sounds: Normal breath sounds.   Abdominal:      General: Abdomen is flat.      Palpations: Abdomen is soft.   Musculoskeletal:         General: Normal range of motion.      Cervical back: Normal range of motion.   Skin:     General: Skin is warm and dry.   Neurological:      General: No focal deficit present.      Mental Status: He is alert. He is disoriented.   Psychiatric:      Comments: Extremely agitated and violent, punching a hole in the wall of room 9, requiring Haldol, Benadryl and Ativan.  Will check his blood work and an EKG and at some point have EPAT evaluate.         ED Course & MDM   Diagnoses as of 05/03/24 0625   Schizoaffective disorder, unspecified type (Multi)   Agitation       Medical Decision Making  EKG evaluated by me: Normal sinus rhythm rate of 84.  Intervals and axes normal, but with prolonged QT.  Patient did require sedation upfront due to his agitation and violent behavior.  He received 50 mg of Benadryl IM along with 2 mg of Ativan and 10 mg of Haldol, intramuscularly.    Patient is medically clear for psychiatric evaluation and placement.            Procedure  Procedures     You Pickett MD  05/02/24 7999       You Pickett MD  05/03/24 0056       You Pickett MD  05/03/24 5056

## 2024-05-03 NOTE — PROGRESS NOTES
Patient was signed out to me at change of shift by the previous ED team.  Please see previous provider's note for complete history and physical exam.    Briefly, this is a 38-year-old male, past medical history significant for polysubstance use disorder, presenting to the emergency department by a friend for concern of agitation and aggressive behavior.  Prior to signout, the patient did require medication including Ativan, Benadryl, Haldol.  Lab work was overall grossly unremarkable.  At time of signout, the patient is pending EPAT evaluation and disposition.  On reevaluation, the patient was sleeping comfortably, arouses to voice, and is in no acute distress.  EPAT did evaluate the patient, and feels that he can safely follow-up as an outpatient with [facility], who he does have establish care with.  Of note, patient did test positive for amphetamine, likely the cause of his agitated behavior.  Patient was discharged home in stable condition.      Impression: Methamphetamine abuse  Disposition: Discharge home  Condition: Stable    Elvira Salomon,   Emergency Medicine

## 2024-05-03 NOTE — ED TRIAGE NOTES
"At time of triage, RN directed patient to change into hospital clothing. RN stepped out of room with patient still in sight. Patient was continuously saying \"I want to kill myself.\" Patient then yelled and proceeded to punch the wall of room 9. When RN and  PD went into room, ensured patient was okay and noticed a hole in the wall of room 9 where the patient punched. Patient then cooperated and changed into hospital clothes. Risks mitigated  "

## 2024-05-03 NOTE — CONSULTS
"Consults  Referring Provider  You Pickett MD     History Of Present Illness  Alexander Zavala is a 38 y.o. male presenting to [facility] ED via triage on 5/2/24 for agitation & SI. Pt was given IM benadryl, haldol, ativan at 2239 for agitation. UDS (+) for amphetamines & cannabis.      Past Medical History  none    Surgical History  He has no past surgical history on file.     Social History  He reports that he has been smoking cigarettes. He has never used smokeless tobacco. He reports current drug use. Drugs: Methamphetamines and Marijuana. No history on file for alcohol use.       Past Psychiatric History/Meds/Treatments  Past Psychiatric History: Multiple, including [facility] 4/15/24; [facility] 01/24,  [facility] 11/2-11/22/23; [facility] for 4-5 months until 8/2023; [facility] 11/2022; [facility] 10/2022, 09/2022; [facility] 06/2022; [facility] 5/2022, 2/2022; [facility] 3x in 2021; [facility] 2021; [facility] 2/2021; [facility] 6/2020; [facility] 5/2020, 1/2019, 9/2018; [facility] 2017; [facility] 2016.  Past Psychiatric Meds/Treatments: Abilify & zyprexa  Past Violence/Victimization History: History of aggression in the ED     Current mental health agency: history at [facility] for medication & Case management      Allergies  Patient has no known allergies.    Review of Systems    Psychiatric ROS - Adult  Anxiety: Negative  Depression: suicidal thoughts and vague, passive SI, no plan or intent  Delirium: negative  Psychosis: negative  Josy: negative    Physical Exam    Mental Status Exam  General: dressed in hospital attire, seen via telemedicine  Appearance: appears older than stated age, disheveled  Attitude: selectively cooperative, keeps trying to lay down to sleep  Behavior: fair eye contact  Motor Activity: no davion PMAR. Gait not assessed.  Speech: low volume, mumbled, minimal  Mood: \"tired\"  Affect: somnolent  Thought Process: linear to answer assessment questions  Thought Content: voices vague, " "passive SI by saying \"I don't want to be alive\", no intent or plan. No HI voiced. No overt delusions  Thought Perception: denies AVH. Does not appear to be responding to hallucinatory stimuli  Cognition: alert & grossly oriented  Insight: limited  Judgement: questionable, resourceful       Psychiatric Risk Assessment  Violence Risk Assessment: lower socioeconomic class, major mental illness, male, pst history of violence, substance abuse, and unemployment  Acute Risk of Harm to Others is Considered: moderate and comment chronically elevated    Suicide Risk Assessment: male, prior suicide attempt, substance abuse, and other vague, passive SI, no plan or intent  Protective Factors against Suicide: hopefulness/future orientation and social support/connectedness  Acute Risk of Harm to Self is Considered: low and moderate (often voices vague SI when intoxicated)    Last Recorded Vitals  Blood pressure 109/70, pulse 87, temperature 36.8 °C (98.2 °F), temperature source Temporal, resp. rate 16, height 1.803 m (5' 11\"), weight 77 kg (169 lb 12.1 oz), SpO2 97%.    Relevant Results  Scheduled medications    Continuous medications    PRN medications    Results for orders placed or performed during the hospital encounter of 05/02/24 (from the past 24 hour(s))   CBC   Result Value Ref Range    WBC 5.6 4.4 - 11.3 x10*3/uL    nRBC 0.0 0.0 - 0.0 /100 WBCs    RBC 5.10 4.50 - 5.90 x10*6/uL    Hemoglobin 15.3 13.5 - 17.5 g/dL    Hematocrit 46.3 41.0 - 52.0 %    MCV 91 80 - 100 fL    MCH 30.0 26.0 - 34.0 pg    MCHC 33.0 32.0 - 36.0 g/dL    RDW 12.9 11.5 - 14.5 %    Platelets 247 150 - 450 x10*3/uL   Comprehensive metabolic panel   Result Value Ref Range    Glucose 98 74 - 99 mg/dL    Sodium 135 (L) 136 - 145 mmol/L    Potassium 3.6 3.5 - 5.3 mmol/L    Chloride 104 98 - 107 mmol/L    Bicarbonate 28 21 - 32 mmol/L    Anion Gap 7 (L) 10 - 20 mmol/L    Urea Nitrogen 19 6 - 23 mg/dL    Creatinine 0.73 0.50 - 1.30 mg/dL    eGFR >90 >60 " mL/min/1.73m*2    Calcium 8.5 (L) 8.6 - 10.3 mg/dL    Albumin 3.8 3.4 - 5.0 g/dL    Alkaline Phosphatase 60 33 - 120 U/L    Total Protein 6.5 6.4 - 8.2 g/dL    AST 21 9 - 39 U/L    Bilirubin, Total 0.4 0.0 - 1.2 mg/dL    ALT 18 10 - 52 U/L   Ethanol   Result Value Ref Range    Alcohol <10 <=10 mg/dL   Salicylate level   Result Value Ref Range    Salicylate  <3 4 - 20 mg/dL   Acetaminophen level   Result Value Ref Range    Acetaminophen <10.0 10.0 - 30.0 ug/mL   Light Blue Top   Result Value Ref Range    Extra Tube Hold for add-ons.    Red Top   Result Value Ref Range    Extra Tube Hold for add-ons.    Gray Top   Result Value Ref Range    Extra Tube Hold for add-ons.    ECG 12 lead   Result Value Ref Range    Ventricular Rate 84 BPM    Atrial Rate 84 BPM    MS Interval 146 ms    QRS Duration 92 ms    QT Interval 410 ms    QTC Calculation(Bazett) 484 ms    P Axis 65 degrees    R Axis 58 degrees    T Axis 53 degrees    QRS Count 13 beats    Q Onset 218 ms    P Onset 145 ms    P Offset 196 ms    T Offset 423 ms    QTC Fredericia 458 ms   Drug Screen, Urine   Result Value Ref Range    Amphetamine Screen, Urine Presumptive Positive (A) Presumptive Negative    Barbiturate Screen, Urine Presumptive Negative Presumptive Negative    Benzodiazepines Screen, Urine Presumptive Negative Presumptive Negative    Cannabinoid Screen, Urine Presumptive Positive (A) Presumptive Negative    Cocaine Metabolite Screen, Urine Presumptive Negative Presumptive Negative    Fentanyl Screen, Urine Presumptive Negative Presumptive Negative    Opiate Screen, Urine Presumptive Negative Presumptive Negative    Oxycodone Screen, Urine Presumptive Negative Presumptive Negative    PCP Screen, Urine Presumptive Negative Presumptive Negative    Methadone Screen, Urine Presumptive Negative Presumptive Negative   Urinalysis with Reflex Microscopic   Result Value Ref Range    Color, Urine Jenna (N) Straw, Yellow    Appearance, Urine Clear Clear    Specific  "Gravity, Urine 1.030 1.005 - 1.035    pH, Urine 6.0 5.0, 5.5, 6.0, 6.5, 7.0, 7.5, 8.0    Protein, Urine 30 (1+) (N) NEGATIVE mg/dL    Glucose, Urine NEGATIVE NEGATIVE mg/dL    Blood, Urine NEGATIVE NEGATIVE    Ketones, Urine NEGATIVE NEGATIVE mg/dL    Bilirubin, Urine NEGATIVE NEGATIVE    Urobilinogen, Urine 4.0 (N) <2.0 mg/dL    Nitrite, Urine NEGATIVE NEGATIVE    Leukocyte Esterase, Urine NEGATIVE NEGATIVE   Microscopic Only, Urine   Result Value Ref Range    WBC, Urine 11-20 (A) 1-5, NONE /HPF    RBC, Urine 1-2 NONE, 1-2, 3-5 /HPF    Squamous Epithelial Cells, Urine NONE Reference range not established. /HPF    Mucus, Urine 4+ Reference range not established. /LPF          Assessment/Plan     Chart review h/o schizophrenia, SIMD, stimulant abuse    EMR reviewed prior to interview. I attempted to interview the pt this morning but he remained too sedated. He did require staff to be present in the room this afternoon so he would stay sitting up & participate in interview.    Pt arrived overnight with his friend for agitation/screaming. The pt has a history of using stimulant (meth) & bath salts. While in ED upon arrival pt screaming about being suicidal, no plans voiced & no gestures observed by ED staff. Pt did punch a hole in the wall of the ED last night. Pt allowed to metabolize prior to psychiatric assessment. He was able to wake up & eat a lunch tray on his own.    On interview today, the pt is selectively cooperative. He does not appear internally stimulated, but appears to be quite lethargic & coming down off of substances. He reports using \"salts\" to this provider. UDS (+) for meth. When asked what he needs, he replied \"a bed\". When assessing his recent admission within the past 2 weeks he says, \"I don't want to be alive\". Pt denies active suicidal plans or intent. He stops cooperating with staff for further information & wants to sleep.    I spoke to the ED attending Dr. John. Given the pt's current " "presentation & past behaviors, with an inpatient psychiatric admission 2 weeks ago, we suspect there is a secondary gain component heavily influenced by substance abuse. The pt has connections at [facility] for follow-up. Pt often presents with SI in the setting of intox & it is unlikely that a subsequent psychiatric admission would warrant any lasting benefit at this time for pt's request of \"a bed\".     Pt appears to be a low/moderate risk of harm to himself & others at baseline due to violent behaviors & reports of fleeting SI without plan or intent. No acute elevation of risk of harm & no evidence of psychiatric decompensation at this time. Least restrictive treatment environment would not be inpatient.     Impression  SIMD  Stimulant UD    Recommendations  Following a chart review, safety risk assessment, interview with pt and ED staff, pt does not meet criteria for involuntary inpatient psychiatric hospitalization at this time. I am not recommending any medication management changes. Please encourage pt to follow-up with outpatient provider.     I discussed these recommendations with the current ED provider (Dr. John) who was in agreement with above plan of care.      I spent 60 minutes in the professional and overall care of this patient.      Medication Consent  Medication Consent: n/a; consult service    Donna Small, APRN-CNP        "

## 2024-05-09 ENCOUNTER — APPOINTMENT (OUTPATIENT)
Dept: CARDIOLOGY | Facility: HOSPITAL | Age: 39
End: 2024-05-09
Payer: MEDICARE

## 2024-05-09 ENCOUNTER — HOSPITAL ENCOUNTER (EMERGENCY)
Facility: HOSPITAL | Age: 39
Discharge: HOME | End: 2024-05-09
Attending: EMERGENCY MEDICINE
Payer: MEDICARE

## 2024-05-09 ENCOUNTER — HOSPITAL ENCOUNTER (EMERGENCY)
Facility: HOSPITAL | Age: 39
Discharge: LEFT BEFORE DISCHARGE | End: 2024-05-09
Payer: MEDICARE

## 2024-05-09 VITALS
HEART RATE: 92 BPM | SYSTOLIC BLOOD PRESSURE: 124 MMHG | WEIGHT: 169 LBS | RESPIRATION RATE: 18 BRPM | HEIGHT: 71 IN | TEMPERATURE: 97.7 F | DIASTOLIC BLOOD PRESSURE: 66 MMHG | OXYGEN SATURATION: 97 % | BODY MASS INDEX: 23.66 KG/M2

## 2024-05-09 DIAGNOSIS — R44.0 AUDITORY HALLUCINATIONS: ICD-10-CM

## 2024-05-09 DIAGNOSIS — F19.10 POLYSUBSTANCE ABUSE (MULTI): Primary | ICD-10-CM

## 2024-05-09 LAB
ALBUMIN SERPL BCP-MCNC: 3.9 G/DL (ref 3.4–5)
ALP SERPL-CCNC: 63 U/L (ref 33–120)
ALT SERPL W P-5'-P-CCNC: 15 U/L (ref 10–52)
AMPHETAMINES UR QL SCN: ABNORMAL
ANION GAP SERPL CALC-SCNC: 10 MMOL/L (ref 10–20)
APPEARANCE UR: CLEAR
AST SERPL W P-5'-P-CCNC: 15 U/L (ref 9–39)
ATRIAL RATE: 98 BPM
BARBITURATES UR QL SCN: ABNORMAL
BASOPHILS # BLD AUTO: 0.03 X10*3/UL (ref 0–0.1)
BASOPHILS NFR BLD AUTO: 0.4 %
BENZODIAZ UR QL SCN: ABNORMAL
BILIRUB SERPL-MCNC: 0.5 MG/DL (ref 0–1.2)
BILIRUB UR STRIP.AUTO-MCNC: NEGATIVE MG/DL
BUN SERPL-MCNC: 19 MG/DL (ref 6–23)
BZE UR QL SCN: ABNORMAL
CALCIUM SERPL-MCNC: 9 MG/DL (ref 8.6–10.3)
CANNABINOIDS UR QL SCN: ABNORMAL
CHLORIDE SERPL-SCNC: 102 MMOL/L (ref 98–107)
CO2 SERPL-SCNC: 26 MMOL/L (ref 21–32)
COLOR UR: YELLOW
CREAT SERPL-MCNC: 0.72 MG/DL (ref 0.5–1.3)
EGFRCR SERPLBLD CKD-EPI 2021: >90 ML/MIN/1.73M*2
EOSINOPHIL # BLD AUTO: 0.11 X10*3/UL (ref 0–0.7)
EOSINOPHIL NFR BLD AUTO: 1.6 %
ERYTHROCYTE [DISTWIDTH] IN BLOOD BY AUTOMATED COUNT: 12.9 % (ref 11.5–14.5)
ETHANOL SERPL-MCNC: <10 MG/DL
FENTANYL+NORFENTANYL UR QL SCN: ABNORMAL
GLUCOSE SERPL-MCNC: 157 MG/DL (ref 74–99)
GLUCOSE UR STRIP.AUTO-MCNC: NEGATIVE MG/DL
HCT VFR BLD AUTO: 47.5 % (ref 41–52)
HGB BLD-MCNC: 16.1 G/DL (ref 13.5–17.5)
HOLD SPECIMEN: NORMAL
IMM GRANULOCYTES # BLD AUTO: 0.01 X10*3/UL (ref 0–0.7)
IMM GRANULOCYTES NFR BLD AUTO: 0.1 % (ref 0–0.9)
KETONES UR STRIP.AUTO-MCNC: NEGATIVE MG/DL
LEUKOCYTE ESTERASE UR QL STRIP.AUTO: NEGATIVE
LYMPHOCYTES # BLD AUTO: 1.43 X10*3/UL (ref 1.2–4.8)
LYMPHOCYTES NFR BLD AUTO: 20.6 %
MCH RBC QN AUTO: 30.2 PG (ref 26–34)
MCHC RBC AUTO-ENTMCNC: 33.9 G/DL (ref 32–36)
MCV RBC AUTO: 89 FL (ref 80–100)
METHADONE UR QL SCN: ABNORMAL
MONOCYTES # BLD AUTO: 0.24 X10*3/UL (ref 0.1–1)
MONOCYTES NFR BLD AUTO: 3.5 %
NEUTROPHILS # BLD AUTO: 5.11 X10*3/UL (ref 1.2–7.7)
NEUTROPHILS NFR BLD AUTO: 73.8 %
NITRITE UR QL STRIP.AUTO: NEGATIVE
NRBC BLD-RTO: 0 /100 WBCS (ref 0–0)
OPIATES UR QL SCN: ABNORMAL
OXYCODONE+OXYMORPHONE UR QL SCN: ABNORMAL
P AXIS: 71 DEGREES
P OFFSET: 209 MS
P ONSET: 147 MS
PCP UR QL SCN: ABNORMAL
PH UR STRIP.AUTO: 6 [PH]
PLATELET # BLD AUTO: 283 X10*3/UL (ref 150–450)
POTASSIUM SERPL-SCNC: 3.7 MMOL/L (ref 3.5–5.3)
PR INTERVAL: 140 MS
PROT SERPL-MCNC: 6.8 G/DL (ref 6.4–8.2)
PROT UR STRIP.AUTO-MCNC: NEGATIVE MG/DL
Q ONSET: 217 MS
QRS COUNT: 16 BEATS
QRS DURATION: 88 MS
QT INTERVAL: 354 MS
QTC CALCULATION(BAZETT): 451 MS
QTC FREDERICIA: 417 MS
R AXIS: 75 DEGREES
RBC # BLD AUTO: 5.33 X10*6/UL (ref 4.5–5.9)
RBC # UR STRIP.AUTO: NEGATIVE /UL
SODIUM SERPL-SCNC: 134 MMOL/L (ref 136–145)
SP GR UR STRIP.AUTO: 1.02
T AXIS: 26 DEGREES
T OFFSET: 394 MS
UROBILINOGEN UR STRIP.AUTO-MCNC: 2 MG/DL
VENTRICULAR RATE: 98 BPM
WBC # BLD AUTO: 6.9 X10*3/UL (ref 4.4–11.3)

## 2024-05-09 PROCEDURE — 81003 URINALYSIS AUTO W/O SCOPE: CPT | Mod: 59 | Performed by: EMERGENCY MEDICINE

## 2024-05-09 PROCEDURE — 2500000004 HC RX 250 GENERAL PHARMACY W/ HCPCS (ALT 636 FOR OP/ED): Mod: JZ,TB | Performed by: EMERGENCY MEDICINE

## 2024-05-09 PROCEDURE — 84075 ASSAY ALKALINE PHOSPHATASE: CPT | Performed by: EMERGENCY MEDICINE

## 2024-05-09 PROCEDURE — 85025 COMPLETE CBC W/AUTO DIFF WBC: CPT | Performed by: EMERGENCY MEDICINE

## 2024-05-09 PROCEDURE — 99284 EMERGENCY DEPT VISIT MOD MDM: CPT

## 2024-05-09 PROCEDURE — 96372 THER/PROPH/DIAG INJ SC/IM: CPT | Performed by: EMERGENCY MEDICINE

## 2024-05-09 PROCEDURE — 4500999001 HC ED NO CHARGE

## 2024-05-09 PROCEDURE — 82077 ASSAY SPEC XCP UR&BREATH IA: CPT | Performed by: EMERGENCY MEDICINE

## 2024-05-09 PROCEDURE — 93005 ELECTROCARDIOGRAM TRACING: CPT

## 2024-05-09 PROCEDURE — 80307 DRUG TEST PRSMV CHEM ANLYZR: CPT | Performed by: EMERGENCY MEDICINE

## 2024-05-09 PROCEDURE — 36415 COLL VENOUS BLD VENIPUNCTURE: CPT | Performed by: EMERGENCY MEDICINE

## 2024-05-09 RX ORDER — OLANZAPINE 10 MG/2ML
10 INJECTION, POWDER, FOR SOLUTION INTRAMUSCULAR ONCE
Status: COMPLETED | OUTPATIENT
Start: 2024-05-09 | End: 2024-05-09

## 2024-05-09 RX ADMIN — OLANZAPINE 10 MG: 10 INJECTION, POWDER, FOR SOLUTION INTRAMUSCULAR at 17:07

## 2024-05-09 SDOH — HEALTH STABILITY: MENTAL HEALTH: IN THE PAST FEW WEEKS, HAVE YOU FELT THAT YOU OR YOUR FAMILY WOULD BE BETTER OFF IF YOU WERE DEAD?: NO

## 2024-05-09 SDOH — HEALTH STABILITY: MENTAL HEALTH: SUICIDE ASSESSMENT: ADULT (C-SSRS)

## 2024-05-09 SDOH — HEALTH STABILITY: MENTAL HEALTH: BEHAVIORS/MOOD: FLAT AFFECT;WITHDRAWN;CALM;GUARDED;PACING;COOPERATIVE

## 2024-05-09 SDOH — HEALTH STABILITY: MENTAL HEALTH: HAVE YOU WISHED YOU WERE DEAD OR WISHED YOU COULD GO TO SLEEP AND NOT WAKE UP?: NO

## 2024-05-09 SDOH — HEALTH STABILITY: MENTAL HEALTH: ANXIETY SYMPTOMS: NO PROBLEMS REPORTED OR OBSERVED.

## 2024-05-09 SDOH — HEALTH STABILITY: MENTAL HEALTH

## 2024-05-09 SDOH — HEALTH STABILITY: MENTAL HEALTH: HAVE YOU ACTUALLY HAD ANY THOUGHTS OF KILLING YOURSELF?: NO

## 2024-05-09 SDOH — HEALTH STABILITY: MENTAL HEALTH: WISH TO BE DEAD (PAST 1 MONTH): NO

## 2024-05-09 SDOH — HEALTH STABILITY: MENTAL HEALTH: DEPRESSION SYMPTOMS: CHANGE IN ENERGY LEVEL

## 2024-05-09 SDOH — HEALTH STABILITY: MENTAL HEALTH: IN THE PAST WEEK, HAVE YOU BEEN HAVING THOUGHTS ABOUT KILLING YOURSELF?: NO

## 2024-05-09 SDOH — HEALTH STABILITY: MENTAL HEALTH: ARE YOU HAVING THOUGHTS OF KILLING YOURSELF RIGHT NOW?: NO

## 2024-05-09 SDOH — HEALTH STABILITY: MENTAL HEALTH: CONTENT: UNABLE TO ASSESS

## 2024-05-09 SDOH — HEALTH STABILITY: MENTAL HEALTH: HAVE YOU EVER DONE ANYTHING, STARTED TO DO ANYTHING, OR PREPARED TO DO ANYTHING TO END YOUR LIFE?: NO

## 2024-05-09 SDOH — HEALTH STABILITY: MENTAL HEALTH: SUICIDAL BEHAVIOR (LIFETIME): NO

## 2024-05-09 SDOH — ECONOMIC STABILITY: HOUSING INSECURITY: FEELS SAFE LIVING IN HOME: YES

## 2024-05-09 SDOH — HEALTH STABILITY: MENTAL HEALTH: HAVE YOU EVER TRIED TO KILL YOURSELF?: NO

## 2024-05-09 SDOH — HEALTH STABILITY: MENTAL HEALTH: IN THE PAST FEW WEEKS, HAVE YOU WISHED YOU WERE DEAD?: NO

## 2024-05-09 SDOH — HEALTH STABILITY: MENTAL HEALTH: NON-SPECIFIC ACTIVE SUICIDAL THOUGHTS (PAST 1 MONTH): NO

## 2024-05-09 ASSESSMENT — LIFESTYLE VARIABLES
PRESCIPTION_ABUSE_PAST_12_MONTHS: NO
SUBSTANCE_ABUSE_PAST_12_MONTHS: NO

## 2024-05-09 ASSESSMENT — PAIN SCALES - GENERAL
PAINLEVEL_OUTOF10: 0 - NO PAIN
PAINLEVEL_OUTOF10: 0 - NO PAIN

## 2024-05-09 ASSESSMENT — PAIN - FUNCTIONAL ASSESSMENT
PAIN_FUNCTIONAL_ASSESSMENT: 0-10
PAIN_FUNCTIONAL_ASSESSMENT: 0-10

## 2024-05-09 ASSESSMENT — COLUMBIA-SUICIDE SEVERITY RATING SCALE - C-SSRS
1. SINCE LAST CONTACT, HAVE YOU WISHED YOU WERE DEAD OR WISHED YOU COULD GO TO SLEEP AND NOT WAKE UP?: NO
2. HAVE YOU ACTUALLY HAD ANY THOUGHTS OF KILLING YOURSELF?: NO
6. HAVE YOU EVER DONE ANYTHING, STARTED TO DO ANYTHING, OR PREPARED TO DO ANYTHING TO END YOUR LIFE?: NO

## 2024-05-09 NOTE — ED PROVIDER NOTES
[facility]   ED  Provider Note  5/9/2024  2:44 PM  AC09/AC09      Chief Complaint   Patient presents with    Altered Mental Status        History of Present Illness:   Alexander Zavala is a 38 y.o. male presenting to the ED for feeling tired, beginning a few days ago.  The complaint has been persistently, moderate in severity, and worsened by not sleeping well.  Patient's brother dropped him off last night and then took him home.  He dropped off again today.  The patient has no complaints except the fact that he feels tired.  He denies suicidal or homicidal thoughts.  He denies drug or alcohol abuse.  He has no headache shortness of breath chest pain GI symptoms or rash.  He denies any recent fever or cough.  Patient is withdrawn and sometimes slow to respond      Review of Systems:   Pertinent positives and review of systems as noted above.  Remaining 10 review of systems is negative or noncontributory to today's episode of care.  Review of Systems       --------------------------------------------- PAST HISTORY ---------------------------------------------  Past Medical History:   Past Medical History:   Diagnosis Date    Drug use     Schizophrenia (Multi)         Past Surgical History: History reviewed. No pertinent surgical history.     Social History:   Social History     Social History Narrative    Not on file        Family History: family history is not on file. Unless otherwise noted, family history is non contributory    Patient's Medications   New Prescriptions    No medications on file   Previous Medications    FLUOXETINE (PROZAC) 20 MG CAPSULE    Take 3 capsules (60 mg) by mouth once daily. Do not start before November 23, 2023.    HALOPERIDOL (HALDOL) 5 MG TABLET    Take 1 tablet (5 mg) by mouth 2 times a day.    HYDROXYZINE PAMOATE (VISTARIL) 50 MG CAPSULE    Take 1 capsule (50 mg) by mouth 3 times a day as needed for anxiety for up to 15 days.    LITHIUM ER (ESKALITH) 450 MG 12 HR TABLET    Take 1  tablet (450 mg) by mouth once daily at bedtime. Do not crush, chew, or split.    LITHIUM ER (LITHOBID) 300 MG 12 HR TABLET    Take 1 tablet (300 mg) by mouth once daily. Do not crush, chew, or split.  Take every morning Do not start before November 23, 2023.    NICOTINE POLACRILEX (NICORETTE) 4 MG GUM    Chew 1 each (4 mg) every 6 hours if needed for smoking cessation (nicotine craving, urge to smoke).    OLANZAPINE (ZYPREXA) 15 MG TABLET    Take 2 tablets (30 mg) by mouth once daily at bedtime.    PRAZOSIN (MINIPRESS) 1 MG CAPSULE    Take 1 capsule (1 mg) by mouth 2 times a day.   Modified Medications    No medications on file   Discontinued Medications    No medications on file      The patient’s home medications have been reviewed.    Allergies: Patient has no known allergies.    -------------------------------------------------- RESULTS -------------------------------------------------  All laboratory and radiology results have been personally reviewed by myself   LABS:  Labs Reviewed   COMPREHENSIVE METABOLIC PANEL - Abnormal       Result Value    Glucose 157 (*)     Sodium 134 (*)     Potassium 3.7      Chloride 102      Bicarbonate 26      Anion Gap 10      Urea Nitrogen 19      Creatinine 0.72      eGFR >90      Calcium 9.0      Albumin 3.9      Alkaline Phosphatase 63      Total Protein 6.8      AST 15      Bilirubin, Total 0.5      ALT 15     DRUG SCREEN,URINE - Abnormal    Amphetamine Screen, Urine Presumptive Positive (*)     Barbiturate Screen, Urine Presumptive Negative      Benzodiazepines Screen, Urine Presumptive Negative      Cannabinoid Screen, Urine Presumptive Positive (*)     Cocaine Metabolite Screen, Urine Presumptive Negative      Fentanyl Screen, Urine Presumptive Negative      Opiate Screen, Urine Presumptive Negative      Oxycodone Screen, Urine Presumptive Negative      PCP Screen, Urine Presumptive Negative      Methadone Screen, Urine Presumptive Negative      Narrative:     Drug  screen results are presumptive and should not be used to assess   compliance with prescribed medication. Contact the performing [facility] laboratory   to add-on definitive confirmatory testing if clinically indicated.    Toxicology screening results are reported qualitatively. The concentration must   be greater than or equal to the cutoff to be reported as positive. The concentration   at which the screening test can detect an individual drug or metabolite varies.   The absence of expected drug(s) and/or drug metabolite(s) may indicate non-compliance,   inappropriate timing of specimen collection relative to drug administration, poor drug   absorption, diluted/adulterated urine, or limitations of testing. For medical purposes   only; not valid for forensic use.    Interpretive questions should be directed to the laboratory medical directors.   URINALYSIS WITH REFLEX CULTURE AND MICROSCOPIC - Abnormal    Color, Urine Yellow      Appearance, Urine Clear      Specific Gravity, Urine 1.018      pH, Urine 6.0      Protein, Urine NEGATIVE      Glucose, Urine NEGATIVE      Blood, Urine NEGATIVE      Ketones, Urine NEGATIVE      Bilirubin, Urine NEGATIVE      Urobilinogen, Urine 2.0 (*)     Nitrite, Urine NEGATIVE      Leukocyte Esterase, Urine NEGATIVE     ALCOHOL - Normal    Alcohol <10     CBC WITH AUTO DIFFERENTIAL    WBC 6.9      nRBC 0.0      RBC 5.33      Hemoglobin 16.1      Hematocrit 47.5      MCV 89      MCH 30.2      MCHC 33.9      RDW 12.9      Platelets 283      Neutrophils % 73.8      Immature Granulocytes %, Automated 0.1      Lymphocytes % 20.6      Monocytes % 3.5      Eosinophils % 1.6      Basophils % 0.4      Neutrophils Absolute 5.11      Immature Granulocytes Absolute, Automated 0.01      Lymphocytes Absolute 1.43      Monocytes Absolute 0.24      Eosinophils Absolute 0.11      Basophils Absolute 0.03     GRAY TOP    Extra Tube Hold for add-ons.     URINALYSIS WITH REFLEX CULTURE AND MICROSCOPIC     "Narrative:     The following orders were created for panel order Urinalysis with Reflex Culture and Microscopic.  Procedure                               Abnormality         Status                     ---------                               -----------         ------                     Urinalysis with Reflex C...[[id number]]  Abnormal            Final result               Extra Urine Gray Tube[[id number]]                            In process                   Please view results for these tests on the individual orders.   EXTRA URINE GRAY TUBE     EKG: Sinus rhythm at 98 bpm, normal axis, nonspecific ST changes, no acute ST elevations, interpreted by NICO Harris MD    RADIOLOGY:  Interpreted by Radiologist.  No orders to display       Encounter Date: 05/09/24   ECG 12 lead   Result Value    Ventricular Rate 98    Atrial Rate 98    OH Interval 140    QRS Duration 88    QT Interval 354    QTC Calculation(Bazett) 451    P Axis 71    R Axis 75    T Axis 26    QRS Count 16    Q Onset 217    P Onset 147    P Offset 209    T Offset 394    QTC Fredericia 417    Narrative    Normal sinus rhythm  Nonspecific T wave abnormality  Abnormal ECG  When compared with ECG of 02-MAY-2024 23:51,  T wave inversion now evident in Inferior leads  Nonspecific T wave abnormality now evident in Lateral leads  See ED provider note for full interpretation and clinical correlation  Confirmed by Akanksha Myeer ([id number]) on 5/9/2024 3:19:06 PM     ------------------------- NURSING NOTES AND VITALS REVIEWED ---------------------------   The nursing notes within the ED encounter and vital signs as below have been reviewed.   /76   Pulse (!) 109   Temp 36.5 °C (97.7 °F)   Resp 18   Ht 1.803 m (5' 11\")   Wt 76.7 kg (169 lb)   SpO2 96%   BMI 23.57 kg/m²   Oxygen Saturation Interpretation: Normal      ---------------------------------------------------PHYSICAL EXAM--------------------------------------  Physical Exam "   Constitutional/General: Alert,  well appearing, non toxic in NAD, slow to respond but consistent  Head: Normocephalic and atraumatic  Eyes: PERRL, EOMI, conjunctiva normal, sclera non icteric  Mouth: Oropharynx clear, handling secretions, no trismus, no asymmetry of the posterior oropharynx or uvular edema  Neck: Supple, full ROM, non tender to palpation in the midline, no stridor, no crepitus, no meningeal signs  Respiratory: Lungs clear to auscultation bilaterally, no wheezes, rales, or rhonchi. Not in respiratory distress  Cardiovascular:  Regular rate. Regular rhythm. No murmurs, gallops, or rubs. 2+ distal pulses  Chest: No chest wall tenderness  GI:  Abdomen Soft, Non tender, Non distended.  +BS. No organomegaly, no palpable masses,  No rebound, guarding, or rigidity.   Musculoskeletal: Moves all extremities x 4. Warm and well perfused, no clubbing, cyanosis, or edema. Capillary refill <3 seconds  Integument: skin warm and dry. No rashes.   Lymphatic: no lymphadenopathy noted  Neurologic: No focal deficits, symmetric strength 5/5 in the upper and lower extremities bilaterally  Psychiatric: Withdrawn, patient denies suicidal or homicidal thoughts.    Procedures    ------------------------------ ED COURSE/MEDICAL DECISION MAKING----------------------  Diagnoses as of 05/09/24 1729   Polysubstance abuse (Multi)   Auditory hallucinations      Patient is hearing voices and getting more agitated.  I have given him Zyprexa IM.  He is medically cleared for psych evaluation by the EPAT.    Patient has been evaluated by the EPAT.  He is not found to be suicidal homicidal.  He continues to use methamphetamine and marijuana not take his prescribed medications.  He needs to follow-up with outpatient mental health services to maintain control of his schizophrenia.    He was advised return to the ER for worsening symptoms or concerns.  He is to follow-up with his mental health care provider this week.          Medical  Decision Making:   [facility]  Diagnoses as of 05/09/24 1729   Polysubstance abuse (Multi)   Auditory hallucinations      Counseling:   The emergency provider has spoken with the patient and discussed today’s results, in addition to providing specific details for the plan of care and counseling regarding the diagnosis and prognosis.  Questions are answered at this time and they are agreeable with the plan.      --------------------------------- IMPRESSION AND DISPOSITION ---------------------------------        IMPRESSION  1. Polysubstance abuse (Multi)    2. Auditory hallucinations        DISPOSITION  Disposition charged to home  Patient condition is fair      Billing Provider Critical Care Time: 0 minutes     Filiberto Harris MD  05/09/24 1732       Filiberto Harris MD  05/09/24 4025

## 2024-05-09 NOTE — PROGRESS NOTES
EPAT - Social Work Psychiatric Assessment    Arrival Details  Mode of Arrival: Other (Comment) (Patient's brother dropped him off)  Admission Source: Emergency department  Admission Type: Voluntary  EPAT Assessment Start Date: 05/09/24  EPAT Assessment Start Time: 1807  Name of : Rosemarie Carter M.Ed., [facility]    History of Present Illness  Admission Reason: Lethargy/Psychosis    The patient is a 38 yr old male with a history of schizoaffective disorder and polysubstance use disorder (THC, Methamphetamine). He presents in the ED with complaints of “being tired”. This is the 3rd EPAT evaluation for the patient within the span of a month. He was recently evaluated by EPAT 6 days ago. Each EPAT evaluation the patient was discharged. The patient is typically meth intoxicated and once patient metabolizes drugs he is typically discharged. The patient scored low risk for suicide on the C-SSRS and has no homicidal ideations. The patient was referred to EPAT for psychiatric evaluation.    Patient history was reviewed before EPAT evaluation.     Psychiatric Diagnosis History: Psychiatric Medication/Treatment History: schizoaffective disorder and polysubstance use disorder (THC, Methamphetamine)    At discharge from inpatient admission, patient was discharged on Abilify 5 mg daily,  Prozac 20 mg daily, Zyprexa 20 mg at bedtime.     The patient has multiple past admissions, most recent was at [facility] 1/11/23-1/22/24           Readmission Information   Readmission within 30 Days: Yes  Previous ED Visit Date and Reason : 5/2/2024 Agitation  Previous Discharge Date and Location: 5/2/24 [facility] ED  Factors Contributing to  Readmission Inpatient/ED (Team Perspective): Substance Abuse, Failed to Keep Follow-Up Appointments    Psychiatric Symptoms  Anxiety Symptoms: No problems reported or observed.  Depression Symptoms: Change in energy level  Josy Symptoms: No problems reported or observed.    Psychosis  "Symptoms  Hallucination Type: Auditory  Delusion Type: No problems reported or observed.    Additional Symptoms - Adult  Generalized Anxiety Disorder: No problems reported or observed.  Obsessive Compulsive Disorder: No problems reported or observed.  Panic Attack: No problems reported or observed.  Post Traumatic Stress Disorder: No problems reported or observed.  Delirium: Acute inattention  Review of Symptoms Comments: The patient reports being \"so tired\" and hearing voices. Would not answer question about what he was hearing. Just turned the screen around every time and quietly saying, \"I don't know. Just hearing voices\"    Past Psychiatric History/Meds/Treatments  Past Psychiatric History: schizoaffective disorder and polysubstance use disorder (THC, Methamphetamine)  Past Psychiatric Meds/Treatments: Psychiatric Medication/Treatment History: schizoaffective disorder and polysubstance use disorder (THC, Methamphetamine)  Past Violence/Victimization History: Patient has been known to get aggressive in ED    Current Mental Health Contacts  Provider Name/Phone Number: JAYLA Garces  Provider Last Appointment Date: Unknown. Patient always is in ED.    Support System: Immediate family, Community    Living Arrangement: House, Lives with someone (Family)    Home Safety  Feels Safe Living in Home: Yes    Income Information  Employment Status for: Patient  Employment Status: Unemployed, Disabled  Income Source: Disability  Current/Previous Occupation:  (None)    [employer] Service/Education History  Current or Previous  Service: None    Social/Cultural History  Social History: The patient is a [place] citizen and own guarantor  Cultural Requests During Hospitalization: None  Spiritual Requests During Hospitalization: None  Important Activities: Family    Legal  Legal Considerations:  (None)  Assistance with Managing/Advocating Healthcare Needs:  (None)  Criminal Activity/ Legal Involvement Pertinent to " Current Situation/ Hospitalization: None reported  Legal Concerns: None    Drug Screening  Have you used any substances (canabis, cocaine, heroin, hallucinogens, inhalants, etc.) in the past 12 months?: No (Patient denies but is positive for methamphetamine and THC)  Have you used any prescription drugs other than prescribed in the past 12 months?: No  Is a toxicology screen needed?: Yes    Stage of Change  Stage of Change: Precontemplation  History of Treatment:  (Patient wouldn't answer)  Type of Treatment Offered: Other (Comment) ([facility])  Treatment Offered: Declined  Duration of Substance Use: Years  Frequency of Substance Use: Several times/week if not daily use of meth  Age of First Substance Use: Unknown    Psychosocial  Psychosocial (WDL): Within Defined Limits  Behaviors/Mood: Appears intoxicated, Manipulative, Uncooperative  Affect: Appropriate to circumstances    Orientation  Orientation Level: Oriented X4    General Appearance  Motor Activity: Unremarkable  Speech Pattern: Excessively soft (Patient would choose what questions he wanted to answer.)  General Attitude: Guarded, Uncooperative  Appearance/Hygiene: Disheveled    Thought Process  Coherency:  (Patient appears to be at his baseline.)  Content: Unremarkable  Delusions:  (None reported or observed)  Perception: Hallucinations  Hallucination: Auditory  Judgment/Insight: Poor (At baseline)  Confusion: None  Cognition: Poor judgement, Poor attention/concentration    Sleep Pattern  Sleep Pattern: Other (Comment) (Patient complains that he's so tired.)    Risk Factors  Self Harm/Suicidal Ideation Plan: Patient denies  Previous Self Harm/Suicidal Plans: Patient denies  Risk Factors: Substance abuse, Major mental illness  Description of Thoughts/Ideas Leaving Unit Now: Unable to assess    Violence Risk Assessment  Assessment of Violence: On admission  Thoughts of Harm to Others: No    Ability to Assess Risk Screen  Risk Screen - Ability to Assess: Able to  be screened  Ask Suicide-Screening Questions  1. In the past few weeks, have you wished you were dead?: No  2. In the past few weeks, have you felt that you or your family would be better off if you were dead?: No  3. In the past week, have you been having thoughts about killing yourself?: No  4. Have you ever tried to kill yourself?: No  5. Are you having thoughts of killing yourself right now?: No  Calculated Risk Score: No intervention is necessary  [place] Suicide Severity Rating Scale (Screener/Recent Self-Report)  1. Wish to be Dead (Past 1 Month): No  2. Non-Specific Active Suicidal Thoughts (Past 1 Month): No  6. Suicidal Behavior (Lifetime): No  Calculated C-SSRS Risk Score (Lifetime/Recent): No Risk Indicated  Step 1: Risk Factors  Current & Past Psychiatric Dx: Psychotic disorder, Alcohol/substance abuse disorders  Presenting Symptoms: Psychosis  Family History:  (None reported)  Precipitants/Stressors: Substance intoxication or withdrawal  Change in Treatment: Non-compliant or not receiving treatment  Access to Lethal Methods : No  Step 2: Protective Factors   Protective Factors Internal: Identifies reasons for living  Protective Factors External: Supportive social network or family or friends  Step 3: Suicidal Ideation Intensity  Most Severe Suicidal Ideation Identified: None/ Patient denies  How Many Times Have You Had These Thoughts: Less than once a week  When You Have the Thoughts How Long do They Last : Fleeting - few seconds or minutes  Could/Can You Stop Thinking About Killing Yourself or Wanting to Die if You Want to: Does not attempt to control thoughts  Are There Things - Anyone or Anything - That Stopped You From Wanting to Die or Acting on: Does not apply  What Sort of Reasons Did You Have For Thinking About Wanting to Die or Killing Yourself: Does not apply  Total Score: 2  Step 5: Documentation  Risk Level: Low suicide risk    Psychiatric Impression and Plan of Care    The patient is a 38  yr old male with a history of schizoaffective disorder and polysubstance use disorder (THC, Methamphetamine). He presents for evaluation complaints of “being tired”. The patient was lying in a hospital bed and answered questions with a low volume. He also would choose to only answer certain questions and when he didn't answer he would flip the screen on the iPad so that this writer couldn't see him and continued to ignore the same question. The patient was somewhat uncooperative and did not really have many valid or acute psychiatric complaints.     The patient denies SI/HI/VH. He endorses hearing voices. When asked what he was hearing the patient turned the screen around several times when asked then finally said, “I don't know. I just hear them.” The patient has a history of hearing voices and it is baseline for him. He denied drug use but labs indicate a positive result for methamphetamine and THC, which the patient has consistently been positive for each time he has been to the ED. When positive lab was addressed, the patient turned the screen around. When asked if he was interested in substance use treatment, the patient turned/flipped the iPad screen around again. This writer had to raise voice volume to get an answer from patient if he was interested in [facility] resources. Patient finally shook his head “No”.     The patient scores low risk for suicide on the C-SSRS and denies any homicidal ideations. He declines any offers for substance use treatment. The patient appears to most likely be coming down off of methamphetamine and is “really tired”, as the patient said many times throughout the evaluation. The patient would not benefit from further psychiatric admissions as he has had multiple in the past and has not benefited. He also is resistant to drug use treatment and continues to use methamphetamines. The patient is recommended for discharge and is encouraged to call outpatient psychiatry to make an  appointment. He already has been given outpatient resources within the past week. The ED provider agrees with discharge.     Diagnostic Impression: schizoaffective disorder and polysubstance use disorder (THC, Methamphetamine)    Specific Resources Provided to Patient: Patient recommended to make an outpatient appointment with psychiatry. He has been given many resources in the past.    CM Notified: N/A  PHP/IOP Recommended: None  Specific Information Provided for PHP/IOP: None  Plan Comments: None    Outcome/Disposition  Patient's Perception of Outcome Achieved: Unable to assess  Assessment, Recommendations and Risk Level Reviewed with: Cesar Harris M.D.  Contact Name: Patient would not provide contacts  Contact Number(s): N/A  Contact Relationship: N/A  EPAT Assessment Completed Date: 05/09/24  EPAT Assessment Completed Time: 1904  Patient Disposition: Home

## 2024-05-09 NOTE — DISCHARGE INSTRUCTIONS
Continue home prescribed medications.    Stop taking methamphetamine and marijuana.    Go to your outpatient counseling sessions to help with your hallucinations.    Return for worsening symptoms or concerns.

## 2024-05-10 LAB — HOLD SPECIMEN: NORMAL

## 2024-06-22 ENCOUNTER — HOSPITAL ENCOUNTER (EMERGENCY)
Facility: HOSPITAL | Age: 39
Discharge: OTHER INSTITUTION | End: 2024-06-23
Attending: EMERGENCY MEDICINE
Payer: MEDICARE

## 2024-06-22 DIAGNOSIS — F25.9 SCHIZOAFFECTIVE DISORDER, UNSPECIFIED TYPE (MULTI): ICD-10-CM

## 2024-06-22 DIAGNOSIS — R45.851 SUICIDAL IDEATION: Primary | ICD-10-CM

## 2024-06-22 DIAGNOSIS — Z91.09 ENVIRONMENTAL ALLERGIES: ICD-10-CM

## 2024-06-22 LAB
ALBUMIN SERPL BCP-MCNC: 4 G/DL (ref 3.4–5)
ALP SERPL-CCNC: 67 U/L (ref 33–120)
ALT SERPL W P-5'-P-CCNC: 13 U/L (ref 10–52)
AMPHETAMINES UR QL SCN: ABNORMAL
ANION GAP SERPL CALC-SCNC: 11 MMOL/L (ref 10–20)
APAP SERPL-MCNC: <10 UG/ML
APPEARANCE UR: ABNORMAL
AST SERPL W P-5'-P-CCNC: 15 U/L (ref 9–39)
BARBITURATES UR QL SCN: ABNORMAL
BASOPHILS # BLD AUTO: 0.05 X10*3/UL (ref 0–0.1)
BASOPHILS NFR BLD AUTO: 0.9 %
BENZODIAZ UR QL SCN: ABNORMAL
BILIRUB SERPL-MCNC: 0.3 MG/DL (ref 0–1.2)
BILIRUB UR STRIP.AUTO-MCNC: NEGATIVE MG/DL
BUN SERPL-MCNC: 12 MG/DL (ref 6–23)
BZE UR QL SCN: ABNORMAL
CALCIUM SERPL-MCNC: 9.4 MG/DL (ref 8.6–10.3)
CANNABINOIDS UR QL SCN: ABNORMAL
CHLORIDE SERPL-SCNC: 107 MMOL/L (ref 98–107)
CO2 SERPL-SCNC: 25 MMOL/L (ref 21–32)
COLOR UR: YELLOW
CREAT SERPL-MCNC: 0.67 MG/DL (ref 0.5–1.3)
EGFRCR SERPLBLD CKD-EPI 2021: >90 ML/MIN/1.73M*2
EOSINOPHIL # BLD AUTO: 0.52 X10*3/UL (ref 0–0.7)
EOSINOPHIL NFR BLD AUTO: 9.6 %
ERYTHROCYTE [DISTWIDTH] IN BLOOD BY AUTOMATED COUNT: 13.4 % (ref 11.5–14.5)
ETHANOL SERPL-MCNC: <10 MG/DL
FENTANYL+NORFENTANYL UR QL SCN: ABNORMAL
GLUCOSE SERPL-MCNC: 122 MG/DL (ref 74–99)
GLUCOSE UR STRIP.AUTO-MCNC: NEGATIVE MG/DL
HCT VFR BLD AUTO: 46.4 % (ref 41–52)
HGB BLD-MCNC: 15.7 G/DL (ref 13.5–17.5)
HOLD SPECIMEN: NORMAL
IMM GRANULOCYTES # BLD AUTO: 0.01 X10*3/UL (ref 0–0.7)
IMM GRANULOCYTES NFR BLD AUTO: 0.2 % (ref 0–0.9)
KETONES UR STRIP.AUTO-MCNC: NEGATIVE MG/DL
LEUKOCYTE ESTERASE UR QL STRIP.AUTO: NEGATIVE
LYMPHOCYTES # BLD AUTO: 1.82 X10*3/UL (ref 1.2–4.8)
LYMPHOCYTES NFR BLD AUTO: 33.8 %
MCH RBC QN AUTO: 30.2 PG (ref 26–34)
MCHC RBC AUTO-ENTMCNC: 33.8 G/DL (ref 32–36)
MCV RBC AUTO: 89 FL (ref 80–100)
METHADONE UR QL SCN: ABNORMAL
MONOCYTES # BLD AUTO: 0.25 X10*3/UL (ref 0.1–1)
MONOCYTES NFR BLD AUTO: 4.6 %
NEUTROPHILS # BLD AUTO: 2.74 X10*3/UL (ref 1.2–7.7)
NEUTROPHILS NFR BLD AUTO: 50.9 %
NITRITE UR QL STRIP.AUTO: NEGATIVE
NRBC BLD-RTO: 0 /100 WBCS (ref 0–0)
OPIATES UR QL SCN: ABNORMAL
OXYCODONE+OXYMORPHONE UR QL SCN: ABNORMAL
PCP UR QL SCN: ABNORMAL
PH UR STRIP.AUTO: 6 [PH]
PLATELET # BLD AUTO: 303 X10*3/UL (ref 150–450)
POTASSIUM SERPL-SCNC: 3.4 MMOL/L (ref 3.5–5.3)
PROT SERPL-MCNC: 6.7 G/DL (ref 6.4–8.2)
PROT UR STRIP.AUTO-MCNC: NEGATIVE MG/DL
RBC # BLD AUTO: 5.2 X10*6/UL (ref 4.5–5.9)
RBC # UR STRIP.AUTO: NEGATIVE /UL
SALICYLATES SERPL-MCNC: <3 MG/DL
SODIUM SERPL-SCNC: 140 MMOL/L (ref 136–145)
SP GR UR STRIP.AUTO: 1.02
UROBILINOGEN UR STRIP.AUTO-MCNC: 4 MG/DL
WBC # BLD AUTO: 5.4 X10*3/UL (ref 4.4–11.3)

## 2024-06-22 PROCEDURE — 80053 COMPREHEN METABOLIC PANEL: CPT | Performed by: EMERGENCY MEDICINE

## 2024-06-22 PROCEDURE — 80320 DRUG SCREEN QUANTALCOHOLS: CPT | Performed by: EMERGENCY MEDICINE

## 2024-06-22 PROCEDURE — 80307 DRUG TEST PRSMV CHEM ANLYZR: CPT | Performed by: EMERGENCY MEDICINE

## 2024-06-22 PROCEDURE — 81003 URINALYSIS AUTO W/O SCOPE: CPT | Mod: 59 | Performed by: EMERGENCY MEDICINE

## 2024-06-22 PROCEDURE — 85025 COMPLETE CBC W/AUTO DIFF WBC: CPT | Performed by: EMERGENCY MEDICINE

## 2024-06-22 PROCEDURE — 2500000001 HC RX 250 WO HCPCS SELF ADMINISTERED DRUGS (ALT 637 FOR MEDICARE OP): Performed by: EMERGENCY MEDICINE

## 2024-06-22 PROCEDURE — 36415 COLL VENOUS BLD VENIPUNCTURE: CPT | Performed by: EMERGENCY MEDICINE

## 2024-06-22 PROCEDURE — 99285 EMERGENCY DEPT VISIT HI MDM: CPT

## 2024-06-22 RX ORDER — DIPHENHYDRAMINE HCL 25 MG
25 CAPSULE ORAL ONCE
Status: COMPLETED | OUTPATIENT
Start: 2024-06-22 | End: 2024-06-22

## 2024-06-22 ASSESSMENT — PAIN - FUNCTIONAL ASSESSMENT: PAIN_FUNCTIONAL_ASSESSMENT: 0-10

## 2024-06-22 ASSESSMENT — PAIN SCALES - GENERAL
PAINLEVEL_OUTOF10: 0 - NO PAIN
PAINLEVEL_OUTOF10: 0 - NO PAIN

## 2024-06-23 ENCOUNTER — APPOINTMENT (OUTPATIENT)
Dept: CARDIOLOGY | Facility: HOSPITAL | Age: 39
End: 2024-06-23
Payer: MEDICARE

## 2024-06-23 VITALS
OXYGEN SATURATION: 98 % | WEIGHT: 169.09 LBS | HEIGHT: 71 IN | BODY MASS INDEX: 23.67 KG/M2 | DIASTOLIC BLOOD PRESSURE: 79 MMHG | HEART RATE: 81 BPM | RESPIRATION RATE: 18 BRPM | TEMPERATURE: 98.1 F | SYSTOLIC BLOOD PRESSURE: 132 MMHG

## 2024-06-23 PROBLEM — Z91.09 ENVIRONMENTAL ALLERGIES: Status: ACTIVE | Noted: 2024-06-23

## 2024-06-23 PROBLEM — F25.9 SCHIZOAFFECTIVE DISORDER (MULTI): Status: ACTIVE | Noted: 2023-11-01

## 2024-06-23 LAB — HOLD SPECIMEN: NORMAL

## 2024-06-23 PROCEDURE — 2500000001 HC RX 250 WO HCPCS SELF ADMINISTERED DRUGS (ALT 637 FOR MEDICARE OP): Performed by: EMERGENCY MEDICINE

## 2024-06-23 PROCEDURE — 93005 ELECTROCARDIOGRAM TRACING: CPT

## 2024-06-23 RX ORDER — LORAZEPAM 1 MG/1
2 TABLET ORAL ONCE
Status: COMPLETED | OUTPATIENT
Start: 2024-06-23 | End: 2024-06-23

## 2024-06-23 SDOH — HEALTH STABILITY: MENTAL HEALTH: SUICIDE ASSESSMENT: ADULT (C-SSRS)

## 2024-06-23 SDOH — HEALTH STABILITY: MENTAL HEALTH: IN THE PAST FEW WEEKS, HAVE YOU FELT THAT YOU OR YOUR FAMILY WOULD BE BETTER OFF IF YOU WERE DEAD?: YES

## 2024-06-23 SDOH — HEALTH STABILITY: MENTAL HEALTH: ACTIVE SUICIDAL IDEATION WITH SOME INTENT TO ACT, WITHOUT SPECIFIC PLAN (PAST 1 MONTH): NO

## 2024-06-23 SDOH — HEALTH STABILITY: MENTAL HEALTH
HAVE YOU STARTED TO WORK OUT OR WORKED OUT THE DETAILS OF HOW TO KILL YOURSELF? DO YOU INTENT TO CARRY OUT THIS PLAN?: NO

## 2024-06-23 SDOH — HEALTH STABILITY: MENTAL HEALTH: HAVE YOU ACTUALLY HAD ANY THOUGHTS OF KILLING YOURSELF?: YES

## 2024-06-23 SDOH — HEALTH STABILITY: MENTAL HEALTH: IN THE PAST FEW WEEKS, HAVE YOU WISHED YOU WERE DEAD?: YES

## 2024-06-23 SDOH — HEALTH STABILITY: MENTAL HEALTH: ACTIVE SUICIDAL IDEATION WITH SPECIFIC PLAN AND INTENT (PAST 1 MONTH): NO

## 2024-06-23 SDOH — HEALTH STABILITY: MENTAL HEALTH: SUICIDAL BEHAVIOR (3 MONTHS): NO

## 2024-06-23 SDOH — HEALTH STABILITY: MENTAL HEALTH: HAVE YOU EVER DONE ANYTHING, STARTED TO DO ANYTHING, OR PREPARED TO DO ANYTHING TO END YOUR LIFE?: NO

## 2024-06-23 SDOH — HEALTH STABILITY: MENTAL HEALTH: HAVE YOU HAD THESE THOUGHTS AND HAD SOME INTENTION OF ACTING ON THEM?: NO

## 2024-06-23 SDOH — HEALTH STABILITY: MENTAL HEALTH: ARE YOU HAVING THOUGHTS OF KILLING YOURSELF RIGHT NOW?: NO

## 2024-06-23 SDOH — HEALTH STABILITY: MENTAL HEALTH: HAVE YOU BEEN THINKING ABOUT HOW YOU MIGHT DO THIS?: YES

## 2024-06-23 SDOH — HEALTH STABILITY: MENTAL HEALTH: WISH TO BE DEAD (PAST 1 MONTH): YES

## 2024-06-23 SDOH — HEALTH STABILITY: MENTAL HEALTH: IN THE PAST WEEK, HAVE YOU BEEN HAVING THOUGHTS ABOUT KILLING YOURSELF?: YES

## 2024-06-23 SDOH — HEALTH STABILITY: MENTAL HEALTH: HAVE YOU EVER TRIED TO KILL YOURSELF?: YES

## 2024-06-23 SDOH — HEALTH STABILITY: MENTAL HEALTH: NON-SPECIFIC ACTIVE SUICIDAL THOUGHTS (PAST 1 MONTH): YES

## 2024-06-23 SDOH — HEALTH STABILITY: MENTAL HEALTH: HAVE YOU WISHED YOU WERE DEAD OR WISHED YOU COULD GO TO SLEEP AND NOT WAKE UP?: YES

## 2024-06-23 SDOH — HEALTH STABILITY: MENTAL HEALTH: BEHAVIORS/MOOD: WITHDRAWN

## 2024-06-23 SDOH — HEALTH STABILITY: MENTAL HEALTH: SUICIDAL BEHAVIOR (LIFETIME): YES

## 2024-06-23 ASSESSMENT — COLUMBIA-SUICIDE SEVERITY RATING SCALE - C-SSRS
1. SINCE LAST CONTACT, HAVE YOU WISHED YOU WERE DEAD OR WISHED YOU COULD GO TO SLEEP AND NOT WAKE UP?: YES
5. HAVE YOU STARTED TO WORK OUT OR WORKED OUT THE DETAILS OF HOW TO KILL YOURSELF? DO YOU INTEND TO CARRY OUT THIS PLAN?: NO
2. HAVE YOU ACTUALLY HAD ANY THOUGHTS OF KILLING YOURSELF?: YES
6. HAVE YOU EVER DONE ANYTHING, STARTED TO DO ANYTHING, OR PREPARED TO DO ANYTHING TO END YOUR LIFE?: NO

## 2024-06-23 NOTE — PROGRESS NOTES
Emergency Medicine Transition of Care Note.    I received Alexander Zavala in signout from Dr. Toro.  Please see the previous ED provider note for all HPI, PE and MDM up to the time of signout at 08:00. This is in addition to the primary record.    In brief Alexander Zavala is an 39 y.o. male presenting for   Chief Complaint   Patient presents with    Hallucinations     Pt brought himself to the ED tonight because he wants evaluated. Pt said he doesn't want to be alive but doesn't want to kill himself. He admits to skipping some of his psych meds recently and also has some hallucinations but wont elaborate what those are.     At the time of signout we were awaiting:     Schizoaffective disorder.  Suicidal ideation with plan to kill himself by walking in front of a train.  Medically cleared.  Accepted for transfer to [facility].  Awaiting available bed.    ED Course as of 06/23/24 0813   Sun Jun 23, 2024   0057 Case discussed with Manuelito TENORIO [MN]   0100 Patient reassessed [MN]      ED Course User Index  [MN] Julia Toro MD         Diagnoses as of 06/23/24 0813   Suicidal ideation   Schizoaffective disorder, unspecified type (Multi)   Environmental allergies       Medical Decision Making      Final diagnoses:   [R45.851] Suicidal ideation   [F25.9] Schizoaffective disorder, unspecified type (Multi)   [Z91.09] Environmental allergies           Procedure  Procedures    Cornel Myers DO

## 2024-06-23 NOTE — ED TRIAGE NOTES
[facility]. Pt said he doesn't want to be alive but doesn't want to kill himself. He admits to skipping some of his psych meds recently and also has some hallucinations but wont elaborate what those are.

## 2024-06-23 NOTE — SIGNIFICANT EVENT
Application for Emergency Admission      Ready for Transfer?  Is the patient medically cleared for transfer to inpatient psychiatry: Yes  Has the patient been accepted to an inpatient psychiatric hospital: Yes    Application for Emergency Admission  IN ACCORDANCE [place]122[place]  The Chief Clinical Officer of: Dylon Jean-Baptiste  6/23/2024 .5:24 AM    Reason for Hospitalization  The undersigned has reason to believe that: Alexander Zavala Is a mentally ill person subject to hospitalization by court order under division B Section 5122.01 of the Revised Code, i.e., this person:    1.Yes  Represents a substantial risk of physical harm to self as manifested by evidence of threats of, or attempts at, suicide or serious self-inflicted bodily harm    2.No Represents a substantial risk of physical harm to others as manifested by evidence of recent homicidal or other violent behavior, evidence of recent threats that place another in reasonable fear of violent behavior and serious physical harm, or other evidence of present dangerousness    3.Yes Represents a substantial and immediate risk of serious physical impairment or injury to self as manifested by  evidence that the person is unable to provide for and is not providing for the person's basic physical needs because of the person's mental illness and that appropriate provision for those needs cannot be made  immediately available in the community    4.Yes Would benefit from treatment in a hospital for his mental illness and is in need of such treatment as manifested by evidence of behavior that creates a grave and imminent risk to substantial rights of others or  himself.    5.Yes Would benefit from treatment as manifested by evidence of behavior that indicates all of the following:       (a) The person is unlikely to survive safely in the community without supervision, based on a clinical determination.       (b) The person has a history of lack of compliance  with treatment for mental illness and one of the following applies:      (i) At least twice within the thirty-six months prior to the filing of an affidavit seeking court-ordered treatment of the person under section 5122.111 of the Revised Code, the lack of compliance has been a significant factor in necessitating hospitalization in a hospital or receipt of services in a forensic or other mental health unit of a correctional facility, provided that the thirty-six-month period shall be extended by the length of any hospitalization or incarceration of the person that occurred within the thirty-six-month period.      (ii) Within the forty-eight months prior to the filing of an affidavit seeking court-ordered treatment of the person under section 5122.111 of the Revised Code, the lack of compliance resulted in one or more acts of serious violent behavior toward self or others or threats of, or attempts at, serious physical harm to self or others, provided that the forty-eight-month period shall be extended by the length of any hospitalization or incarceration of the person that occurred within the forty-eight-month period.      (c) The person, as a result of mental illness, is unlikely to voluntarily participate in necessary treatment.       (d) In view of the person's treatment history and current behavior, the person is in need of treatment in order to prevent a relapse or deterioration that would be likely to result in substantial risk of serious harm to the person or others.    (e) Represents a substantial risk of physical harm to self or others if allowed to remain at liberty pending examination.    Therefore, it is requested that said person be admitted to the above named facility.    STATEMENT OF BELIEF    Must be filled out by one of the following: a psychiatrist, licensed physician, licensed clinical psychologist, health or ,  or .  (Statement shall include the circumstances  under which the individual was taken into custody and the reason for the person's belief that hospitalization is necessary. The statement shall also include a reference to efforts made to secure the individual's property at his residence if he was taken into custody there. Every reasonable and appropriate effort should be made to take this person into custody in the least conspicuous manner possible.)    Patient states that he does not want to live anymore.  He states he does not deserve to live.  He states he often has thoughts of killing himself with platelets excluded dripping of a bridge, lying down and waiting for training or cutting himself    Julia Toro MD 6/23/2024     _____________________________________________________________   [facility] [employer]    STATEMENT OF OBSERVATION BY PSYCHIATRIST, LICENSED PHYSICIAN, OR LICENSED CLINICAL PSYCHOLOGIST, IF APPLICABLE    Place of Observation (e.g., [facility] mental health center, general hospital, office, emergency facility)  (If applicable, please complete)    Julia Toro MD 6/23/2024    _____________________________________________________________
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
## 2024-06-23 NOTE — ED PROVIDER NOTES
"HPI   Chief Complaint   Patient presents with    Hallucinations     Pt brought himself to the ED tonight because he wants evaluated. Pt said he doesn't want to be alive but doesn't want to kill himself. He admits to skipping some of his psych meds recently and also has some hallucinations but wont elaborate what those are.         History provided by:  Medical records and patient   used: No      This patient presents to the emergency department ambulatory at his own initiation because he feels he needs psychiatric evaluation.  He states that he has been missing doses of his psychiatric medications and his began to feel like he does not want to be alive anymore.  He states he does not have any thoughts about actually killing himself but is very sad about his continued existence.    Patient states that he sometimes sees and hears things that other people do not hear but is unwilling to discuss what the details of those are just \"different kinds of things\".    Patient denies any recent fevers, chills, coughs, nausea, vomiting.  He denies any injury.  He said no pain.  He notes that the household where he is currently living he has cats that he is allergic to them; therefore he has been having increased nasal congestion and drainage.  No hives or rash.                  Shannan Coma Scale Score: 15                     Patient History   Past Medical History:   Diagnosis Date    Drug use     Schizophrenia (Multi)      History reviewed. No pertinent surgical history.  No family history on file.  Social History     Tobacco Use    Smoking status: Every Day     Types: Cigarettes    Smokeless tobacco: Never   Vaping Use    Vaping status: Some Days   Substance Use Topics    Alcohol use: Not on file    Drug use: Not Currently     Types: Methamphetamines, Marijuana       Physical Exam   ED Triage Vitals [06/22/24 2138]   Temperature Heart Rate Respirations BP   36.7 °C (98.1 °F) 88 19 135/87      SpO2 Temp " Source Heart Rate Source Patient Position   97 % Temporal Monitor --      BP Location FiO2 (%)     -- --       Physical Exam  Vitals reviewed.   HENT:      Head: Normocephalic.      Nose: Rhinorrhea present.      Mouth/Throat:      Mouth: Mucous membranes are moist.   Neck:      Comments: Trachea midline, no stridor  Cardiovascular:      Rate and Rhythm: Normal rate and regular rhythm.      Pulses: Normal pulses.   Pulmonary:      Effort: Pulmonary effort is normal.      Breath sounds: Normal breath sounds.   Abdominal:      General: Bowel sounds are normal.   Musculoskeletal:         General: No signs of injury. Normal range of motion.      Cervical back: Normal range of motion and neck supple.   Skin:     General: Skin is warm and dry.      Capillary Refill: Capillary refill takes less than 2 seconds.   Neurological:      General: No focal deficit present.      Mental Status: He is alert.   Psychiatric:      Comments: Mood is depressed, affect is restricted he has very poor eye contact and speaks very quietly.  He denies suicidal plan but has failure to be above 34 he also has auditory and visual hallucinations that he is unwilling to describe.       EKG  2200 --twelve-lead EKG was obtained and read by me. This demonstrates normal sinus rhythm with a rate of 71, normal intervals, normal axes, no ectopy, no ischemia, no pericarditis. There was no change compared to most recent prior EKG. 5/9/24    Labs Reviewed   COMPREHENSIVE METABOLIC PANEL - Abnormal       Result Value    Glucose 122 (*)     Sodium 140      Potassium 3.4 (*)     Chloride 107      Bicarbonate 25      Anion Gap 11      Urea Nitrogen 12      Creatinine 0.67      eGFR >90      Calcium 9.4      Albumin 4.0      Alkaline Phosphatase 67      Total Protein 6.7      AST 15      Bilirubin, Total 0.3      ALT 13     DRUG SCREEN,URINE - Abnormal    Amphetamine Screen, Urine Presumptive Positive (*)     Barbiturate Screen, Urine Presumptive Negative       Benzodiazepines Screen, Urine Presumptive Negative      Cannabinoid Screen, Urine Presumptive Positive (*)     Cocaine Metabolite Screen, Urine Presumptive Negative      Fentanyl Screen, Urine Presumptive Negative      Opiate Screen, Urine Presumptive Negative      Oxycodone Screen, Urine Presumptive Negative      PCP Screen, Urine Presumptive Negative      Methadone Screen, Urine Presumptive Negative      Narrative:     Drug screen results are presumptive and should not be used to assess   compliance with prescribed medication. Contact the performing [facility] laboratory   to add-on definitive confirmatory testing if clinically indicated.    Toxicology screening results are reported qualitatively. The concentration must   be greater than or equal to the cutoff to be reported as positive. The concentration   at which the screening test can detect an individual drug or metabolite varies.   The absence of expected drug(s) and/or drug metabolite(s) may indicate non-compliance,   inappropriate timing of specimen collection relative to drug administration, poor drug   absorption, diluted/adulterated urine, or limitations of testing. For medical purposes   only; not valid for forensic use.    Interpretive questions should be directed to the laboratory medical directors.   URINALYSIS WITH REFLEX CULTURE AND MICROSCOPIC - Abnormal    Color, Urine Yellow      Appearance, Urine Hazy (*)     Specific Gravity, Urine 1.023      pH, Urine 6.0      Protein, Urine NEGATIVE      Glucose, Urine NEGATIVE      Blood, Urine NEGATIVE      Ketones, Urine NEGATIVE      Bilirubin, Urine NEGATIVE      Urobilinogen, Urine 4.0 (*)     Nitrite, Urine NEGATIVE      Leukocyte Esterase, Urine NEGATIVE     ACUTE TOXICOLOGY PANEL, BLOOD - Normal    Acetaminophen <10.0      Salicylate  <3      Alcohol <10     CBC WITH AUTO DIFFERENTIAL    WBC 5.4      nRBC 0.0      RBC 5.20      Hemoglobin 15.7      Hematocrit 46.4      MCV 89      MCH 30.2      MCHC 33.8       RDW 13.4      Platelets 303      Neutrophils % 50.9      Immature Granulocytes %, Automated 0.2      Lymphocytes % 33.8      Monocytes % 4.6      Eosinophils % 9.6      Basophils % 0.9      Neutrophils Absolute 2.74      Immature Granulocytes Absolute, Automated 0.01      Lymphocytes Absolute 1.82      Monocytes Absolute 0.25      Eosinophils Absolute 0.52      Basophils Absolute 0.05     GRAY TOP    Extra Tube Hold for add-ons.     URINALYSIS WITH REFLEX CULTURE AND MICROSCOPIC    Narrative:     The following orders were created for panel order Urinalysis with Reflex Culture and Microscopic.  Procedure                               Abnormality         Status                     ---------                               -----------         ------                     Urinalysis with Reflex C...[[id number]]  Abnormal            Final result               Extra Urine Gray Tube[[id number]]                            In process                   Please view results for these tests on the individual orders.   EXTRA URINE GRAY TUBE     ED Medication Administration from 06/22/2024 2124 to 06/23/2024 0031         Date/Time Order Dose Route Action Action by     06/22/2024 2157 EDT diphenhydrAMINE (BENADryl) capsule 25 mg 25 mg oral Given SUNNY Peres            ED Course & MDM   ED Course as of 06/23/24 0638   Sun Jun 23, 2024   0057 Case discussed with Manuelito TENORIO [MN]   0100 Patient reassessed [MN]      ED Course User Index  [MN] Julia Toro MD         Diagnoses as of 06/23/24 0638   Suicidal ideation   Schizoaffective disorder, unspecified type (Multi)   Environmental allergies       Medical Decision Making  Patient presents emergency department with the above history and physical.  Patient does have evidence of allergic rhinitis remanence of sinusitis, pharyngitis, pneumonia.  Patient given oral Benadryl for symptoms.    Patient has a history of schizoaffective disorder and is now complaining of passive suicidal  ideation and depressive symptoms.  He has no evidence of acute life-threatening medical illness or injury.  Prohibited appropriate psychiatric evaluation and treatment; therefore, patient is medically cleared.  Order entered into [facility] for EPAT consultation.    On patient reassessment he states that he does not deserve to live for people to waste money on him and he has no purpose in life.  He states that he thinks often about killing himself.  When asked to describe a plan he states that he would jump off a bridge or lay down and wait for a train to come.    I feel that patient requires hospitalization fo rfurther evaluation and treatment of his psychiatric disorder. Patient is asking for rmedication to help him sleep.    0455 --notification from EPAT the patient has been accepted for admission at [facility].  We will continue patient under safe supervision in the emergency department pending transport.    Procedure  Procedures    Diagnoses as of 06/23/24 0633   Suicidal ideation   Schizoaffective disorder, unspecified type (Multi)        Julia Toro MD  06/23/24 0633       Julia Toro MD  06/23/24 0638

## 2024-06-25 LAB
ATRIAL RATE: 71 BPM
P AXIS: 65 DEGREES
P OFFSET: 189 MS
P ONSET: 139 MS
PR INTERVAL: 154 MS
Q ONSET: 216 MS
QRS COUNT: 12 BEATS
QRS DURATION: 98 MS
QT INTERVAL: 398 MS
QTC CALCULATION(BAZETT): 432 MS
QTC FREDERICIA: 421 MS
R AXIS: 69 DEGREES
T AXIS: 49 DEGREES
T OFFSET: 415 MS
VENTRICULAR RATE: 71 BPM

## 2024-07-13 ENCOUNTER — APPOINTMENT (OUTPATIENT)
Dept: CARDIOLOGY | Facility: HOSPITAL | Age: 39
End: 2024-07-13
Payer: MEDICARE

## 2024-07-13 ENCOUNTER — HOSPITAL ENCOUNTER (EMERGENCY)
Facility: HOSPITAL | Age: 39
Discharge: HOME | End: 2024-07-13
Attending: FAMILY MEDICINE
Payer: MEDICARE

## 2024-07-13 VITALS
TEMPERATURE: 97.4 F | WEIGHT: 170 LBS | OXYGEN SATURATION: 97 % | HEIGHT: 72 IN | HEART RATE: 72 BPM | BODY MASS INDEX: 23.03 KG/M2 | RESPIRATION RATE: 18 BRPM | SYSTOLIC BLOOD PRESSURE: 123 MMHG | DIASTOLIC BLOOD PRESSURE: 81 MMHG

## 2024-07-13 DIAGNOSIS — F19.94 SUBSTANCE INDUCED MOOD DISORDER (MULTI): ICD-10-CM

## 2024-07-13 DIAGNOSIS — F19.10 POLYSUBSTANCE ABUSE (MULTI): ICD-10-CM

## 2024-07-13 DIAGNOSIS — F25.9 SCHIZOAFFECTIVE DISORDER, UNSPECIFIED TYPE (MULTI): Primary | ICD-10-CM

## 2024-07-13 LAB
ALBUMIN SERPL BCP-MCNC: 4.2 G/DL (ref 3.4–5)
ALP SERPL-CCNC: 74 U/L (ref 33–120)
ALT SERPL W P-5'-P-CCNC: 16 U/L (ref 10–52)
ANION GAP SERPL CALC-SCNC: 10 MMOL/L (ref 10–20)
AST SERPL W P-5'-P-CCNC: 15 U/L (ref 9–39)
BASOPHILS # BLD AUTO: 0.05 X10*3/UL (ref 0–0.1)
BASOPHILS NFR BLD AUTO: 0.8 %
BILIRUB SERPL-MCNC: 0.8 MG/DL (ref 0–1.2)
BUN SERPL-MCNC: 17 MG/DL (ref 6–23)
CALCIUM SERPL-MCNC: 9.3 MG/DL (ref 8.6–10.3)
CHLORIDE SERPL-SCNC: 106 MMOL/L (ref 98–107)
CK SERPL-CCNC: 82 U/L (ref 0–325)
CO2 SERPL-SCNC: 26 MMOL/L (ref 21–32)
CREAT SERPL-MCNC: 0.69 MG/DL (ref 0.5–1.3)
EGFRCR SERPLBLD CKD-EPI 2021: >90 ML/MIN/1.73M*2
EOSINOPHIL # BLD AUTO: 0.5 X10*3/UL (ref 0–0.7)
EOSINOPHIL NFR BLD AUTO: 7.9 %
ERYTHROCYTE [DISTWIDTH] IN BLOOD BY AUTOMATED COUNT: 13.7 % (ref 11.5–14.5)
ETHANOL SERPL-MCNC: <10 MG/DL
GLUCOSE SERPL-MCNC: 99 MG/DL (ref 74–99)
HCT VFR BLD AUTO: 47.8 % (ref 41–52)
HGB BLD-MCNC: 16 G/DL (ref 13.5–17.5)
IMM GRANULOCYTES # BLD AUTO: 0.01 X10*3/UL (ref 0–0.7)
IMM GRANULOCYTES NFR BLD AUTO: 0.2 % (ref 0–0.9)
LYMPHOCYTES # BLD AUTO: 1.6 X10*3/UL (ref 1.2–4.8)
LYMPHOCYTES NFR BLD AUTO: 25.4 %
MCH RBC QN AUTO: 30.4 PG (ref 26–34)
MCHC RBC AUTO-ENTMCNC: 33.5 G/DL (ref 32–36)
MCV RBC AUTO: 91 FL (ref 80–100)
MONOCYTES # BLD AUTO: 0.4 X10*3/UL (ref 0.1–1)
MONOCYTES NFR BLD AUTO: 6.3 %
NEUTROPHILS # BLD AUTO: 3.75 X10*3/UL (ref 1.2–7.7)
NEUTROPHILS NFR BLD AUTO: 59.4 %
NRBC BLD-RTO: 0 /100 WBCS (ref 0–0)
PLATELET # BLD AUTO: 285 X10*3/UL (ref 150–450)
POTASSIUM SERPL-SCNC: 3.5 MMOL/L (ref 3.5–5.3)
PROT SERPL-MCNC: 7.3 G/DL (ref 6.4–8.2)
RBC # BLD AUTO: 5.26 X10*6/UL (ref 4.5–5.9)
SODIUM SERPL-SCNC: 138 MMOL/L (ref 136–145)
WBC # BLD AUTO: 6.3 X10*3/UL (ref 4.4–11.3)

## 2024-07-13 PROCEDURE — 2500000004 HC RX 250 GENERAL PHARMACY W/ HCPCS (ALT 636 FOR OP/ED): Mod: JZ,TB

## 2024-07-13 PROCEDURE — 84520 ASSAY OF UREA NITROGEN: CPT | Performed by: FAMILY MEDICINE

## 2024-07-13 PROCEDURE — 82077 ASSAY SPEC XCP UR&BREATH IA: CPT | Performed by: FAMILY MEDICINE

## 2024-07-13 PROCEDURE — 85025 COMPLETE CBC W/AUTO DIFF WBC: CPT | Performed by: FAMILY MEDICINE

## 2024-07-13 PROCEDURE — 96372 THER/PROPH/DIAG INJ SC/IM: CPT

## 2024-07-13 PROCEDURE — 36415 COLL VENOUS BLD VENIPUNCTURE: CPT | Performed by: FAMILY MEDICINE

## 2024-07-13 PROCEDURE — 99283 EMERGENCY DEPT VISIT LOW MDM: CPT

## 2024-07-13 PROCEDURE — 93005 ELECTROCARDIOGRAM TRACING: CPT

## 2024-07-13 PROCEDURE — 82550 ASSAY OF CK (CPK): CPT | Performed by: FAMILY MEDICINE

## 2024-07-13 RX ORDER — OLANZAPINE 10 MG/2ML
10 INJECTION, POWDER, FOR SOLUTION INTRAMUSCULAR ONCE
Status: COMPLETED | OUTPATIENT
Start: 2024-07-13 | End: 2024-07-13

## 2024-07-13 RX ORDER — NALOXONE HYDROCHLORIDE 1 MG/ML
2 INJECTION INTRAMUSCULAR; INTRAVENOUS; SUBCUTANEOUS ONCE
Status: DISCONTINUED | OUTPATIENT
Start: 2024-07-13 | End: 2024-07-13

## 2024-07-13 RX ORDER — OLANZAPINE 10 MG/2ML
INJECTION, POWDER, FOR SOLUTION INTRAMUSCULAR
Status: COMPLETED
Start: 2024-07-13 | End: 2024-07-13

## 2024-07-13 RX ADMIN — OLANZAPINE 10 MG: 10 INJECTION, POWDER, FOR SOLUTION INTRAMUSCULAR at 09:06

## 2024-07-13 ASSESSMENT — PAIN SCALES - GENERAL: PAINLEVEL_OUTOF10: 0 - NO PAIN

## 2024-07-13 ASSESSMENT — PAIN - FUNCTIONAL ASSESSMENT: PAIN_FUNCTIONAL_ASSESSMENT: 0-10

## 2024-07-13 NOTE — PROGRESS NOTES
Emergency Medicine Transition of Care Note.    I received Alexander Zavala in signout from Dr. Gaytan.  Please see the previous ED provider note for all HPI, PE and MDM up to the time of signout at 10:00. This is in addition to the primary record.    In brief Alexander Zavala is an 39 y.o. male presenting for methamphetamine intoxication, substance-induced mood disorder, schizoaffective disorder.  Received Zyprexa 10 mg IM and Narcan 2 mg IM.    ED Medication Administration from 07/13/2024 0657 to 07/13/2024 1218         Date/Time Order Dose Route Action Action by     07/13/2024 0735 EDT naloxone (Narcan) injection 2 mg 2 mg intramuscular Not Given LIAM Andrews     07/13/2024 0906 EDT OLANZapine (ZyPREXA) injection 10 mg 10 mg intramuscular Given LIAM Andrews            Chief Complaint   Patient presents with    Manic Behavior     At the time of signout we were awaiting:     [ X ] -Repeat evaluation when awake and disposition    ED Course as of 07/13/24 1218   Sat Jul 13, 2024   1216 Patient now awake and ambulatory throughout the emergency department.  His mentation is normal.  He has a steady unassisted gait.  Decision-making capacity is intact.    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  I considered admission.  He does not meet criteria for admission to an inpatient psychiatric facility.    He was referred to [facility] for psychiatric and substance abuse treatment.    He was advised to return in 12 hours or sooner with any concerns.  He is agreeable with this plan verbalized understanding.    Patient was discharged from the emergency department ambulatory under his own power. [BT]      ED Course User Index  [BT] Cornel Myers DO         Diagnoses as of 07/13/24 1218   Schizoaffective disorder, unspecified type (Multi)   Polysubstance abuse (Multi)   Substance induced mood disorder (Multi)       Medical Decision Making      Final diagnoses:   [F25.9] Schizoaffective disorder,  unspecified type (Multi)   [F19.10] Polysubstance abuse (Multi)   [F19.94] Substance induced mood disorder (Multi)           Procedure  Procedures    Cornel Myers DO

## 2024-07-13 NOTE — ED NOTES
Patient was dropped off at ER entrance. On arrival patient is mumbling incomprehensible sounds. Patient is calm and cooperative. When asked questions, patient does not answer. Mood appears withdrawn.     Reji Nolan RN  07/13/24 5921

## 2024-07-13 NOTE — ED PROVIDER NOTES
HPI   Chief Complaint   Patient presents with    Manic Behavior       39-year-old male with history of polysubstance abuse and schizophrenia dropped off to the ED by an unknown individual concerned about change in mental status and paranoid bizarre behavior.  Patient upon arrival to ED responded to commands and was cooperative however all he would do his mumble and exhibited bizarre behavior.  Patient was a poor historian at this time and no further information can be gathered.  Additional information was reviewed in the chart and patient has extensive history of admissions for psychiatric care and concerns regarding.  I schizophrenia and polysubstance abuse.      History provided by:  Patient and medical records  History limited by:  Psychiatric disorder                      Ono Coma Scale Score: 12                     Patient History   Past Medical History:   Diagnosis Date    Drug use     Schizophrenia (Multi)      History reviewed. No pertinent surgical history.  No family history on file.  Social History     Tobacco Use    Smoking status: Every Day     Types: Cigarettes    Smokeless tobacco: Never   Vaping Use    Vaping status: Some Days   Substance Use Topics    Alcohol use: Not on file    Drug use: Not Currently     Types: Methamphetamines, Marijuana       Physical Exam   ED Triage Vitals [07/13/24 0718]   Temperature Heart Rate Respirations BP   36.3 °C (97.4 °F) 72 18 123/81      SpO2 Temp src Heart Rate Source Patient Position   97 % -- -- --      BP Location FiO2 (%)     -- --       Physical Exam  Vitals and nursing note reviewed.   Constitutional:       General: He is not in acute distress.     Appearance: He is well-developed.   HENT:      Head: Normocephalic and atraumatic.   Eyes:      Conjunctiva/sclera: Conjunctivae normal.   Cardiovascular:      Rate and Rhythm: Normal rate and regular rhythm.      Heart sounds: No murmur heard.  Pulmonary:      Effort: Pulmonary effort is normal. No  respiratory distress.      Breath sounds: Normal breath sounds.   Abdominal:      Palpations: Abdomen is soft.      Tenderness: There is no abdominal tenderness.   Musculoskeletal:         General: No swelling.      Cervical back: Neck supple.   Skin:     General: Skin is warm and dry.      Capillary Refill: Capillary refill takes less than 2 seconds.   Neurological:      Mental Status: He is alert.      GCS: GCS eye subscore is 4. GCS verbal subscore is 2. GCS motor subscore is 6.      Cranial Nerves: Cranial nerves 2-12 are intact.      Sensory: Sensation is intact.      Motor: Motor function is intact.      Coordination: Coordination is intact.      Gait: Gait is intact.      Comments: Unable to fully assess due to patient psychiatric condition   Psychiatric:         Attention and Perception: He is inattentive.         Mood and Affect: Mood is depressed. Affect is flat.         Speech: Speech is delayed.         Behavior: Behavior is slowed and withdrawn. Behavior is cooperative.         Cognition and Memory: Cognition is impaired. Memory is impaired.         Judgment: Judgment is inappropriate.      Comments: Unable to fully assess due to lack of responses to questions         ED Course & MDM   Diagnoses as of 07/13/24 0750   Schizoaffective disorder, unspecified type (Multi)   Polysubstance abuse (Multi)     Labs Reviewed   CBC WITH AUTO DIFFERENTIAL       Result Value    WBC 6.3      nRBC 0.0      RBC 5.26      Hemoglobin 16.0      Hematocrit 47.8      MCV 91      MCH 30.4      MCHC 33.5      RDW 13.7      Platelets 285      Neutrophils % 59.4      Immature Granulocytes %, Automated 0.2      Lymphocytes % 25.4      Monocytes % 6.3      Eosinophils % 7.9      Basophils % 0.8      Neutrophils Absolute 3.75      Immature Granulocytes Absolute, Automated 0.01      Lymphocytes Absolute 1.60      Monocytes Absolute 0.40      Eosinophils Absolute 0.50      Basophils Absolute 0.05     COMPREHENSIVE METABOLIC PANEL    DRUG SCREEN,URINE   ALCOHOL   CREATINE KINASE   URINALYSIS WITH REFLEX CULTURE AND MICROSCOPIC    Narrative:     The following orders were created for panel order Urinalysis with Reflex Culture and Microscopic.  Procedure                               Abnormality         Status                     ---------                               -----------         ------                     Urinalysis with Reflex C...[[id number]]                                                 Extra Urine Gray Tube[[id number]]                                                         Please view results for these tests on the individual orders.   URINALYSIS WITH REFLEX CULTURE AND MICROSCOPIC   EXTRA URINE GRAY TUBE     0741 -- NSR rate 87.  Normal axis.  Normal intervals.  No ST-T changes or signs of ischemia.  Normal EKG with no findings of STEMI. Unchanged compared to old EKG    Medical Decision Making  Patient upon arrival to the ED appeared to be slowed, withdrawn, and exhibiting bizarre behavior but cooperating with staff when asked to follow commands but would only answer questions with mumbling.  Patient's epic chart was reviewed and at this time IV line was started, labs sent, EKG reviewed, placed under psychiatric precautions, placed on cardiac monitor, and observed.  At this time care was transferred over to oncoming provider Dr. Myers.    Amount and/or Complexity of Data Reviewed  External Data Reviewed: labs, radiology, ECG and notes.  Labs: ordered. Decision-making details documented in ED Course.  ECG/medicine tests: ordered and independent interpretation performed. Decision-making details documented in ED Course.        Procedure  Procedures     Paxton Rodríguez MD  07/13/24 0758

## 2024-07-15 ENCOUNTER — APPOINTMENT (OUTPATIENT)
Dept: CARDIOLOGY | Facility: HOSPITAL | Age: 39
End: 2024-07-15
Payer: MEDICARE

## 2024-07-15 ENCOUNTER — HOSPITAL ENCOUNTER (EMERGENCY)
Facility: HOSPITAL | Age: 39
Discharge: HOME | End: 2024-07-16
Attending: EMERGENCY MEDICINE
Payer: MEDICARE

## 2024-07-15 ENCOUNTER — APPOINTMENT (OUTPATIENT)
Dept: RADIOLOGY | Facility: HOSPITAL | Age: 39
End: 2024-07-15
Payer: MEDICARE

## 2024-07-15 DIAGNOSIS — F20.9 SCHIZOPHRENIA, UNSPECIFIED TYPE (MULTI): ICD-10-CM

## 2024-07-15 DIAGNOSIS — F19.10 POLYSUBSTANCE ABUSE (MULTI): ICD-10-CM

## 2024-07-15 DIAGNOSIS — F25.9 SCHIZOAFFECTIVE DISORDER, UNSPECIFIED TYPE (MULTI): ICD-10-CM

## 2024-07-15 DIAGNOSIS — R41.0 DISORIENTATION: Primary | ICD-10-CM

## 2024-07-15 LAB
ALBUMIN SERPL BCP-MCNC: 4 G/DL (ref 3.4–5)
ALP SERPL-CCNC: 68 U/L (ref 33–120)
ALT SERPL W P-5'-P-CCNC: 13 U/L (ref 10–52)
ANION GAP SERPL CALC-SCNC: 11 MMOL/L (ref 10–20)
AST SERPL W P-5'-P-CCNC: 13 U/L (ref 9–39)
ATRIAL RATE: 87 BPM
BASOPHILS # BLD AUTO: 0.03 X10*3/UL (ref 0–0.1)
BASOPHILS NFR BLD AUTO: 0.6 %
BILIRUB SERPL-MCNC: 0.9 MG/DL (ref 0–1.2)
BUN SERPL-MCNC: 21 MG/DL (ref 6–23)
CALCIUM SERPL-MCNC: 9.1 MG/DL (ref 8.6–10.3)
CHLORIDE SERPL-SCNC: 105 MMOL/L (ref 98–107)
CO2 SERPL-SCNC: 26 MMOL/L (ref 21–32)
CREAT SERPL-MCNC: 0.72 MG/DL (ref 0.5–1.3)
EGFRCR SERPLBLD CKD-EPI 2021: >90 ML/MIN/1.73M*2
EOSINOPHIL # BLD AUTO: 0.29 X10*3/UL (ref 0–0.7)
EOSINOPHIL NFR BLD AUTO: 6.1 %
ERYTHROCYTE [DISTWIDTH] IN BLOOD BY AUTOMATED COUNT: 13.7 % (ref 11.5–14.5)
ETHANOL SERPL-MCNC: <10 MG/DL
GLUCOSE SERPL-MCNC: 140 MG/DL (ref 74–99)
HCT VFR BLD AUTO: 49.4 % (ref 41–52)
HGB BLD-MCNC: 16.4 G/DL (ref 13.5–17.5)
IMM GRANULOCYTES # BLD AUTO: 0 X10*3/UL (ref 0–0.7)
IMM GRANULOCYTES NFR BLD AUTO: 0 % (ref 0–0.9)
INR PPP: 1.2 (ref 0.9–1.1)
LACTATE SERPL-SCNC: 0.8 MMOL/L (ref 0.4–2)
LYMPHOCYTES # BLD AUTO: 1.23 X10*3/UL (ref 1.2–4.8)
LYMPHOCYTES NFR BLD AUTO: 25.7 %
MCH RBC QN AUTO: 30.3 PG (ref 26–34)
MCHC RBC AUTO-ENTMCNC: 33.2 G/DL (ref 32–36)
MCV RBC AUTO: 91 FL (ref 80–100)
MONOCYTES # BLD AUTO: 0.23 X10*3/UL (ref 0.1–1)
MONOCYTES NFR BLD AUTO: 4.8 %
NEUTROPHILS # BLD AUTO: 3 X10*3/UL (ref 1.2–7.7)
NEUTROPHILS NFR BLD AUTO: 62.8 %
NRBC BLD-RTO: 0 /100 WBCS (ref 0–0)
P AXIS: 88 DEGREES
P OFFSET: 195 MS
P ONSET: 147 MS
PLATELET # BLD AUTO: 244 X10*3/UL (ref 150–450)
POTASSIUM SERPL-SCNC: 3.2 MMOL/L (ref 3.5–5.3)
PR INTERVAL: 134 MS
PROT SERPL-MCNC: 6.9 G/DL (ref 6.4–8.2)
PROTHROMBIN TIME: 13.8 SECONDS (ref 9.8–12.8)
Q ONSET: 214 MS
QRS COUNT: 14 BEATS
QRS DURATION: 88 MS
QT INTERVAL: 400 MS
QTC CALCULATION(BAZETT): 481 MS
QTC FREDERICIA: 452 MS
R AXIS: 79 DEGREES
RBC # BLD AUTO: 5.41 X10*6/UL (ref 4.5–5.9)
SODIUM SERPL-SCNC: 139 MMOL/L (ref 136–145)
T AXIS: 80 DEGREES
T OFFSET: 414 MS
VENTRICULAR RATE: 87 BPM
WBC # BLD AUTO: 4.8 X10*3/UL (ref 4.4–11.3)

## 2024-07-15 PROCEDURE — 85610 PROTHROMBIN TIME: CPT | Performed by: EMERGENCY MEDICINE

## 2024-07-15 PROCEDURE — 80178 ASSAY OF LITHIUM: CPT | Mod: CONLAB | Performed by: EMERGENCY MEDICINE

## 2024-07-15 PROCEDURE — 93005 ELECTROCARDIOGRAM TRACING: CPT

## 2024-07-15 PROCEDURE — 70450 CT HEAD/BRAIN W/O DYE: CPT

## 2024-07-15 PROCEDURE — 36415 COLL VENOUS BLD VENIPUNCTURE: CPT | Performed by: EMERGENCY MEDICINE

## 2024-07-15 PROCEDURE — 82077 ASSAY SPEC XCP UR&BREATH IA: CPT | Performed by: EMERGENCY MEDICINE

## 2024-07-15 PROCEDURE — 99285 EMERGENCY DEPT VISIT HI MDM: CPT | Mod: 25

## 2024-07-15 PROCEDURE — 83605 ASSAY OF LACTIC ACID: CPT | Performed by: EMERGENCY MEDICINE

## 2024-07-15 PROCEDURE — 85025 COMPLETE CBC W/AUTO DIFF WBC: CPT | Performed by: EMERGENCY MEDICINE

## 2024-07-15 PROCEDURE — 70450 CT HEAD/BRAIN W/O DYE: CPT | Performed by: RADIOLOGY

## 2024-07-15 PROCEDURE — 84075 ASSAY ALKALINE PHOSPHATASE: CPT | Performed by: EMERGENCY MEDICINE

## 2024-07-15 ASSESSMENT — PAIN - FUNCTIONAL ASSESSMENT: PAIN_FUNCTIONAL_ASSESSMENT: 0-10

## 2024-07-15 ASSESSMENT — COLUMBIA-SUICIDE SEVERITY RATING SCALE - C-SSRS
1. IN THE PAST MONTH, HAVE YOU WISHED YOU WERE DEAD OR WISHED YOU COULD GO TO SLEEP AND NOT WAKE UP?: NO
6. HAVE YOU EVER DONE ANYTHING, STARTED TO DO ANYTHING, OR PREPARED TO DO ANYTHING TO END YOUR LIFE?: NO
2. HAVE YOU ACTUALLY HAD ANY THOUGHTS OF KILLING YOURSELF?: NO

## 2024-07-15 ASSESSMENT — PAIN SCALES - GENERAL: PAINLEVEL_OUTOF10: 0 - NO PAIN

## 2024-07-15 NOTE — ED PROVIDER NOTES
[facility]   ED  Provider Note  7/15/2024  4:12 PM  AC09/AC09      Chief Complaint   Patient presents with    Altered Mental Status        History of Present Illness:   Alexander Zavala is a 39-year-old male with known schizophrenia and drug abuse.  He presents for altered mental status.  Patient unable to answer questions at this time.  He is mumbling and not making sense.      Review of Systems:   Pertinent positives and review of systems as noted above.  Remaining 10 review of systems is negative or noncontributory to today's episode of care.  Review of Systems       --------------------------------------------- PAST HISTORY ---------------------------------------------  Past Medical History:   Past Medical History:   Diagnosis Date    Drug use     Schizophrenia (Multi)         Past Surgical History: History reviewed. No pertinent surgical history.     Social History:   Social History     Social History Narrative    Not on file        Family History: family history is not on file. Unless otherwise noted, family history is non contributory    Patient's Medications   New Prescriptions    No medications on file   Previous Medications    FLUOXETINE (PROZAC) 20 MG CAPSULE    Take 3 capsules (60 mg) by mouth once daily. Do not start before November 23, 2023.    HALOPERIDOL (HALDOL) 5 MG TABLET    Take 1 tablet (5 mg) by mouth 2 times a day.    HYDROXYZINE PAMOATE (VISTARIL) 50 MG CAPSULE    Take 1 capsule (50 mg) by mouth 3 times a day as needed for anxiety for up to 15 days.    LITHIUM ER (ESKALITH) 450 MG 12 HR TABLET    Take 1 tablet (450 mg) by mouth once daily at bedtime. Do not crush, chew, or split.    LITHIUM ER (LITHOBID) 300 MG 12 HR TABLET    Take 1 tablet (300 mg) by mouth once daily. Do not crush, chew, or split.  Take every morning Do not start before November 23, 2023.    NICOTINE POLACRILEX (NICORETTE) 4 MG GUM    Chew 1 each (4 mg) every 6 hours if needed for smoking cessation (nicotine craving, urge to  smoke).    OLANZAPINE (ZYPREXA) 15 MG TABLET    Take 2 tablets (30 mg) by mouth once daily at bedtime.    PRAZOSIN (MINIPRESS) 1 MG CAPSULE    Take 1 capsule (1 mg) by mouth 2 times a day.   Modified Medications    No medications on file   Discontinued Medications    No medications on file      The patient’s home medications have been reviewed.    Allergies: Patient has no known allergies.    -------------------------------------------------- RESULTS -------------------------------------------------  All laboratory and radiology results have been personally reviewed by myself   LABS:  Labs Reviewed   DRUG SCREEN,URINE - Abnormal       Result Value    Amphetamine Screen, Urine Presumptive Positive (*)     Barbiturate Screen, Urine Presumptive Negative      Benzodiazepines Screen, Urine Presumptive Negative      Cannabinoid Screen, Urine Presumptive Positive (*)     Cocaine Metabolite Screen, Urine Presumptive Negative      Fentanyl Screen, Urine Presumptive Negative      Opiate Screen, Urine Presumptive Negative      Oxycodone Screen, Urine Presumptive Negative      PCP Screen, Urine Presumptive Negative      Methadone Screen, Urine Presumptive Negative      Narrative:     Drug screen results are presumptive and should not be used to assess   compliance with prescribed medication. Contact the performing [facility] laboratory   to add-on definitive confirmatory testing if clinically indicated.    Toxicology screening results are reported qualitatively. The concentration must   be greater than or equal to the cutoff to be reported as positive. The concentration   at which the screening test can detect an individual drug or metabolite varies.   The absence of expected drug(s) and/or drug metabolite(s) may indicate non-compliance,   inappropriate timing of specimen collection relative to drug administration, poor drug   absorption, diluted/adulterated urine, or limitations of testing. For medical purposes   only; not valid for  forensic use.    Interpretive questions should be directed to the laboratory medical directors.   PROTIME-INR - Abnormal    Protime 13.8 (*)     INR 1.2 (*)    COMPREHENSIVE METABOLIC PANEL - Abnormal    Glucose 140 (*)     Sodium 139      Potassium 3.2 (*)     Chloride 105      Bicarbonate 26      Anion Gap 11      Urea Nitrogen 21      Creatinine 0.72      eGFR >90      Calcium 9.1      Albumin 4.0      Alkaline Phosphatase 68      Total Protein 6.9      AST 13      Bilirubin, Total 0.9      ALT 13     URINALYSIS WITH REFLEX MICROSCOPIC - Abnormal    Color, Urine Jenna (*)     Appearance, Urine Clear      Specific Gravity, Urine 1.033      pH, Urine 5.0      Protein, Urine NEGATIVE      Glucose, Urine NEGATIVE      Blood, Urine NEGATIVE      Ketones, Urine 5 (TRACE) (*)     Bilirubin, Urine SMALL (1+) (*)     Urobilinogen, Urine 4.0 (*)     Nitrite, Urine NEGATIVE      Leukocyte Esterase, Urine NEGATIVE     LITHIUM - Abnormal    Lithium <0.10 (*)    ALCOHOL - Normal    Alcohol <10     LACTATE - Normal    Lactate 0.8      Narrative:     Venipuncture immediately after or during the administration of Metamizole may lead to falsely low results. Testing should be performed immediately  prior to Metamizole dosing.   CBC WITH AUTO DIFFERENTIAL    WBC 4.8      nRBC 0.0      RBC 5.41      Hemoglobin 16.4      Hematocrit 49.4      MCV 91      MCH 30.3      MCHC 33.2      RDW 13.7      Platelets 244      Neutrophils % 62.8      Immature Granulocytes %, Automated 0.0      Lymphocytes % 25.7      Monocytes % 4.8      Eosinophils % 6.1      Basophils % 0.6      Neutrophils Absolute 3.00      Immature Granulocytes Absolute, Automated 0.00      Lymphocytes Absolute 1.23      Monocytes Absolute 0.23      Eosinophils Absolute 0.29      Basophils Absolute 0.03       EKG: Sinus tachycardia 119 bpm, normal axis, normal intervals, no acute ST elevations.  Interpreted by NICO Harris MD    RADIOLOGY:  Interpreted by Radiologist.  CT head  wo IV contrast   Final Result   No evidence of acute cortical infarct or intracranial hemorrhage.        If persistent concern, consider MRI        MACRO:   None        Signed by: Joseph Hinson 7/15/2024 6:11 PM   Dictation workstation:   [id number]            ------------------------- NURSING NOTES AND VITALS REVIEWED ---------------------------   The nursing notes within the ED encounter and vital signs as below have been reviewed.   BP (!) 152/98   Pulse (!) 122   Temp 36.6 °C (97.9 °F) (Temporal)   Resp 20   Ht 1.829 m (6')   Wt 77 kg (169 lb 12.1 oz)   SpO2 99%   BMI 23.02 kg/m²   Oxygen Saturation Interpretation: Normal      ---------------------------------------------------PHYSICAL EXAM--------------------------------------  Physical Exam   Constitutional/General: Confused he does not answer questions well his speech is slurred  Head: Normocephalic and atraumatic  Eyes: PERRL, EOMI, conjunctiva normal, sclera non icteric  Mouth: Oropharynx clear, handling secretions, no trismus, no asymmetry of the posterior oropharynx or uvular edema  Neck: Supple, full ROM, non tender to palpation in the midline, no stridor, no crepitus, no meningeal signs  Respiratory: Lungs clear to auscultation bilaterally, no wheezes, rales, or rhonchi. Not in respiratory distress  Cardiovascular:  Regular rate. Regular rhythm. No murmurs, gallops, or rubs. 2+ distal pulses  Chest: No chest wall tenderness  GI:  Abdomen Soft, Non tender, Non distended.  +BS. No organomegaly, no palpable masses,  No rebound, guarding, or rigidity.   Musculoskeletal: Moves all extremities x 4. Warm and well perfused, no clubbing, cyanosis, or edema. Capillary refill <3 seconds  Integument: skin warm and dry. No rashes.   Lymphatic: no lymphadenopathy noted  Neurologic: No focal deficits, symmetric strength 5/5 in the upper and lower extremities bilaterally  Psychiatric: Confused slurred speech unable to carry on a  conversation    Procedures    ------------------------------ ED COURSE/MEDICAL DECISION MAKING----------------------  ED Course as of 07/16/24 1318   Mon Jul 15, 2024   2311 sleeping [MN]   Tue Jul 16, 2024   0404 asleep [MN]      ED Course User Index  [MN] Julia Toro MD         Diagnoses as of 07/16/24 1318   Disorientation   Schizophrenia, unspecified type (Multi)   Schizoaffective disorder, unspecified type (Multi)   Polysubstance abuse (Multi)      Patient presents for evaluation of altered mental status.  He is pending urinalysis and urine drug screen for psychiatric clearance.  CT scan of the brain is negative.  CBC and CMP show no significant abnormalities.  Lactic acid and alcohol levels are normal.  He will be signed out to Dr. Toro for psychiatric evaluation.    I resumed care of this morning pending urinalysis and drug screen on this patient.  These were obtained and were positive for marijuana and methamphetamine.  Patient is now awake and able to answer questions well.  He is interviewed by EPAT on 2 separate occasions and appears stable for outpatient management.  He did he denies any desire to hurt himself or others.  He has no plans.  He prefers to be managed at home.  Patient was advised to stop using drugs and alcohol as they will lead to bad outcomes and possible permanent injury or death.    Medical Decision Making:   Altered mental status with history of schizophrenia and drug abuse  ED Course as of 07/16/24 1318   Mon Jul 15, 2024   2311 sleeping [MN]   Tue Jul 16, 2024   0404 asleep [MN]      ED Course User Index  [MN] Julia Toro MD         Diagnoses as of 07/16/24 1318   Disorientation   Schizophrenia, unspecified type (Multi)   Schizoaffective disorder, unspecified type (Multi)   Polysubstance abuse (Multi)   0913  Patient is medically cleared for psychiatric evaluation  Counseling:   The emergency provider has spoken with the patient and discussed today’s results, in  addition to providing specific details for the plan of care and counseling regarding the diagnosis and prognosis.  Questions are answered at this time and they are agreeable with the plan.      --------------------------------- IMPRESSION AND DISPOSITION ---------------------------------        IMPRESSION  1. Disorientation    2. Schizophrenia, unspecified type (Multi)    3. Schizoaffective disorder, unspecified type (Multi)    4. Polysubstance abuse (Multi)        DISPOSITION  Disposition: I have resumed care for this patient.  He is stable for outpatient management we discharged to home following [facility] discharge plan.  Patient condition is fair      Billing Provider Critical Care Time: 0 minutes     Filiberto Harris MD  07/15/24 1947       Filiberto Harris MD  07/15/24 2010       Filiberto Harris MD  07/15/24 2035       Filiberto Harris MD  07/16/24 0913       Filiberto Harris MD  07/16/24 1340

## 2024-07-16 VITALS
BODY MASS INDEX: 22.99 KG/M2 | WEIGHT: 169.75 LBS | HEIGHT: 72 IN | RESPIRATION RATE: 17 BRPM | HEART RATE: 87 BPM | DIASTOLIC BLOOD PRESSURE: 68 MMHG | OXYGEN SATURATION: 99 % | TEMPERATURE: 97.9 F | SYSTOLIC BLOOD PRESSURE: 113 MMHG

## 2024-07-16 PROBLEM — R41.0 DISORIENTATION: Status: ACTIVE | Noted: 2024-07-16

## 2024-07-16 PROBLEM — F20.9 SCHIZOPHRENIA (MULTI): Status: ACTIVE | Noted: 2024-01-01

## 2024-07-16 LAB
AMPHETAMINES UR QL SCN: ABNORMAL
APPEARANCE UR: CLEAR
ATRIAL RATE: 119 BPM
BARBITURATES UR QL SCN: ABNORMAL
BENZODIAZ UR QL SCN: ABNORMAL
BILIRUB UR STRIP.AUTO-MCNC: ABNORMAL MG/DL
BZE UR QL SCN: ABNORMAL
CANNABINOIDS UR QL SCN: ABNORMAL
COLOR UR: ABNORMAL
FENTANYL+NORFENTANYL UR QL SCN: ABNORMAL
GLUCOSE UR STRIP.AUTO-MCNC: NEGATIVE MG/DL
KETONES UR STRIP.AUTO-MCNC: ABNORMAL MG/DL
LEUKOCYTE ESTERASE UR QL STRIP.AUTO: NEGATIVE
LITHIUM SERPL-SCNC: <0.1 MMOL/L (ref 0.6–1.2)
METHADONE UR QL SCN: ABNORMAL
NITRITE UR QL STRIP.AUTO: NEGATIVE
OPIATES UR QL SCN: ABNORMAL
OXYCODONE+OXYMORPHONE UR QL SCN: ABNORMAL
P AXIS: 79 DEGREES
P OFFSET: 215 MS
P ONSET: 153 MS
PCP UR QL SCN: ABNORMAL
PH UR STRIP.AUTO: 5 [PH]
PR INTERVAL: 128 MS
PROT UR STRIP.AUTO-MCNC: NEGATIVE MG/DL
Q ONSET: 217 MS
QRS COUNT: 20 BEATS
QRS DURATION: 86 MS
QT INTERVAL: 326 MS
QTC CALCULATION(BAZETT): 458 MS
QTC FREDERICIA: 409 MS
R AXIS: 80 DEGREES
RBC # UR STRIP.AUTO: NEGATIVE /UL
SP GR UR STRIP.AUTO: 1.03
T AXIS: 34 DEGREES
T OFFSET: 380 MS
UROBILINOGEN UR STRIP.AUTO-MCNC: 4 MG/DL
VENTRICULAR RATE: 119 BPM

## 2024-07-16 PROCEDURE — 80307 DRUG TEST PRSMV CHEM ANLYZR: CPT | Performed by: EMERGENCY MEDICINE

## 2024-07-16 PROCEDURE — 81003 URINALYSIS AUTO W/O SCOPE: CPT | Mod: 59 | Performed by: EMERGENCY MEDICINE

## 2024-07-16 PROCEDURE — 99223 1ST HOSP IP/OBS HIGH 75: CPT

## 2024-07-16 ASSESSMENT — PAIN SCALES - GENERAL: PAINLEVEL_OUTOF10: 0 - NO PAIN

## 2024-07-16 NOTE — PROGRESS NOTES
"Alexander Zavala is a 39 y.o. male on day 0 of admission presenting with a known history of schizophrenia and substance use.  He was brought to the emergency department for behavioral health evaluation due to disorganized behavior and not answering questions.  Patient was initially evaluated by Dr. Harris.  Please see his notes for full details H&P.  Patient was signed out to me at 2000 change of shift pending urine drug screen and EPAT consultation.    Subjective   Patient has been sleeping for the majority of my shift.  On evaluation this morning he is still sleeping, but does arouse to gentle verbal communication.  He states that he just wants to go home.  He states he does not think he can give a urine yet, but declines anything to drink.       Objective     Physical Exam  Vitals reviewed.   Constitutional:       Appearance: He is well-developed.   HENT:      Head: Normocephalic.   Eyes:      General: No visual field deficit.     Extraocular Movements: Extraocular movements intact.   Cardiovascular:      Rate and Rhythm: Normal rate and regular rhythm.      Heart sounds: Normal heart sounds.   Pulmonary:      Effort: Pulmonary effort is normal.      Breath sounds: Normal breath sounds.   Musculoskeletal:         General: Normal range of motion.   Neurological:      Mental Status: He is alert.      GCS: GCS eye subscore is 3. GCS verbal subscore is 5. GCS motor subscore is 6.      Cranial Nerves: No cranial nerve deficit, dysarthria or facial asymmetry.      Sensory: No sensory deficit.      Motor: No weakness.   Psychiatric:      Comments: Withdrawn, poor eye contact, minimal engagement with me will not answer questions about SI, HI, hallucinations, just answers \"I want to go home\"         Last Recorded Vitals  Blood pressure (!) 152/98, pulse (!) 122, temperature 36.6 °C (97.9 °F), temperature source Temporal, resp. rate 20, height 1.829 m (6'), weight 77 kg (169 lb 12.1 oz), SpO2 99%.  Intake/Output last 3 " Shifts:  No intake/output data recorded.    Relevant Results              Labs Reviewed   PROTIME-INR - Abnormal       Result Value    Protime 13.8 (*)     INR 1.2 (*)    COMPREHENSIVE METABOLIC PANEL - Abnormal    Glucose 140 (*)     Sodium 139      Potassium 3.2 (*)     Chloride 105      Bicarbonate 26      Anion Gap 11      Urea Nitrogen 21      Creatinine 0.72      eGFR >90      Calcium 9.1      Albumin 4.0      Alkaline Phosphatase 68      Total Protein 6.9      AST 13      Bilirubin, Total 0.9      ALT 13     ALCOHOL - Normal    Alcohol <10     LACTATE - Normal    Lactate 0.8      Narrative:     Venipuncture immediately after or during the administration of Metamizole may lead to falsely low results. Testing should be performed immediately  prior to Metamizole dosing.   CBC WITH AUTO DIFFERENTIAL    WBC 4.8      nRBC 0.0      RBC 5.41      Hemoglobin 16.4      Hematocrit 49.4      MCV 91      MCH 30.3      MCHC 33.2      RDW 13.7      Platelets 244      Neutrophils % 62.8      Immature Granulocytes %, Automated 0.0      Lymphocytes % 25.7      Monocytes % 4.8      Eosinophils % 6.1      Basophils % 0.6      Neutrophils Absolute 3.00      Immature Granulocytes Absolute, Automated 0.00      Lymphocytes Absolute 1.23      Monocytes Absolute 0.23      Eosinophils Absolute 0.29      Basophils Absolute 0.03     DRUG SCREEN,URINE   URINALYSIS WITH REFLEX MICROSCOPIC   LITHIUM     CT head wo IV contrast   Final Result   No evidence of acute cortical infarct or intracranial hemorrhage.        If persistent concern, consider MRI        MACRO:   None        Signed by: Joseph Hinson 7/15/2024 6:11 PM   Dictation workstation:   [id number]                         Assessment/Plan   Active Problems:    Schizophrenia (Multi)    Disorientation    Patient has been kept under safe supervision only.  He has no evidence of any acute life-threatening medical illness or injury to prohibit appropriate psychiatric evaluation.  He  is Azan on able or unwilling to provide urine specimen, but is not demonstrating any evidence of urosepsis; therefore, I feel he is medically clear for psychiatric evaluation and treatment at this time.  Consult request will be entered into epic.  Case will be signed out to Dr. Harris pending EPAT consult.       I spent 0 minutes in the critical care of this patient.      Julia Toro MD

## 2024-07-16 NOTE — CONSULTS
"Consults  Referring Provider  Dr. Harris     History Of Present Illness  Alexander Zavala is a 39 y.o. male presenting to [facility] ED on 7/15/24 for c/c of AMS. UDS (+) for methamphetamines & cannabis.      Past Medical History  none    Surgical History  He has no past surgical history on file.     Social History  He reports that he has been smoking cigarettes. He has never used smokeless tobacco. He reports that he does not currently use drugs after having used the following drugs: Methamphetamines and Marijuana. No history on file for alcohol use.       Past Psychiatric History/Meds/Treatments    Past Psychiatric History: Multiple, including [facility] 4/15/24; [facility] 01/24,  [facility] 11/2-11/22/23; [facility] for 4-5 months until 8/2023; [facility] 11/2022; [facility] 10/2022, 09/2022; [facility] 06/2022; [facility] 5/2022, 2/2022; [facility] 3x in 2021; [facility] 2021; [facility] 2/2021; [facility] 6/2020; [facility] 5/2020, 1/2019, 9/2018; [facility] 2017; [facility].  Past Psychiatric Meds/Treatments: Abilify & zyprexa  Past Violence/Victimization History: History of aggression in the ED     Current mental health agency: history at [facility] for medication & Case management  Allergies  Patient has no known allergies.    Review of Systems    Psychiatric ROS - Adult  Anxiety: Negative  Depression: negative  Delirium: negative  Psychosis: negative  Josy: negative  Safety Issues: none      Physical Exam    Mental Status Exam  General: dressed in hospital attire, seen via telemedicine  Appearance: appears older than stated age, disheveled  Attitude: selectively cooperative, keeps trying to lay down to sleep  Behavior: eyes mostly closed  Motor Activity: no davion PMAR. Gait not assessed.  Speech: low volume, mumbled, minimal  Mood: \"ready to go\"  Affect: somnolent/irritable  Thought Process: linear to answer assessment questions  Thought Content: No HI or SI voiced. No overt delusions  Thought " Perception: denies AVH. Does not appear to be responding to hallucinatory stimuli  Cognition: alert & grossly oriented  Insight: limited  Judgement: questionable, resourceful     Psychiatric Risk Assessment  Violence Risk Assessment: lower socioeconomic class, major mental illness, male, pst history of violence, substance abuse, and unemployment  Acute Risk of Harm to Others is Considered: moderate and comment chronically elevated    Suicide Risk Assessment: male, prior suicide attempt, substance abuse, and other vague, passive SI, no plan or intent  Protective Factors against Suicide: hopefulness/future orientation and social support/connectedness  Acute Risk of Harm to Self is Considered: low and moderate (often voices vague SI when intoxicated)    Last Recorded Vitals  Blood pressure 113/68, pulse 87, temperature 36.6 °C (97.9 °F), temperature source Temporal, resp. rate 17, height 1.829 m (6'), weight 77 kg (169 lb 12.1 oz), SpO2 99%.    Relevant Results  Results for orders placed or performed during the hospital encounter of 07/15/24 (from the past 24 hour(s))   ECG 12 Lead   Result Value Ref Range    Ventricular Rate 119 BPM    Atrial Rate 119 BPM    MD Interval 128 ms    QRS Duration 86 ms    QT Interval 326 ms    QTC Calculation(Bazett) 458 ms    P Axis 79 degrees    R Axis 80 degrees    T Axis 34 degrees    QRS Count 20 beats    Q Onset 217 ms    P Onset 153 ms    P Offset 215 ms    T Offset 380 ms    QTC Fredericia 409 ms   Alcohol   Result Value Ref Range    Alcohol <10 <=10 mg/dL   Protime-INR   Result Value Ref Range    Protime 13.8 (H) 9.8 - 12.8 seconds    INR 1.2 (H) 0.9 - 1.1   Lactate   Result Value Ref Range    Lactate 0.8 0.4 - 2.0 mmol/L   Comprehensive Metabolic Panel   Result Value Ref Range    Glucose 140 (H) 74 - 99 mg/dL    Sodium 139 136 - 145 mmol/L    Potassium 3.2 (L) 3.5 - 5.3 mmol/L    Chloride 105 98 - 107 mmol/L    Bicarbonate 26 21 - 32 mmol/L    Anion Gap 11 10 - 20 mmol/L    Urea  Nitrogen 21 6 - 23 mg/dL    Creatinine 0.72 0.50 - 1.30 mg/dL    eGFR >90 >60 mL/min/1.73m*2    Calcium 9.1 8.6 - 10.3 mg/dL    Albumin 4.0 3.4 - 5.0 g/dL    Alkaline Phosphatase 68 33 - 120 U/L    Total Protein 6.9 6.4 - 8.2 g/dL    AST 13 9 - 39 U/L    Bilirubin, Total 0.9 0.0 - 1.2 mg/dL    ALT 13 10 - 52 U/L   CBC and Auto Differential   Result Value Ref Range    WBC 4.8 4.4 - 11.3 x10*3/uL    nRBC 0.0 0.0 - 0.0 /100 WBCs    RBC 5.41 4.50 - 5.90 x10*6/uL    Hemoglobin 16.4 13.5 - 17.5 g/dL    Hematocrit 49.4 41.0 - 52.0 %    MCV 91 80 - 100 fL    MCH 30.3 26.0 - 34.0 pg    MCHC 33.2 32.0 - 36.0 g/dL    RDW 13.7 11.5 - 14.5 %    Platelets 244 150 - 450 x10*3/uL    Neutrophils % 62.8 40.0 - 80.0 %    Immature Granulocytes %, Automated 0.0 0.0 - 0.9 %    Lymphocytes % 25.7 13.0 - 44.0 %    Monocytes % 4.8 2.0 - 10.0 %    Eosinophils % 6.1 0.0 - 6.0 %    Basophils % 0.6 0.0 - 2.0 %    Neutrophils Absolute 3.00 1.20 - 7.70 x10*3/uL    Immature Granulocytes Absolute, Automated 0.00 0.00 - 0.70 x10*3/uL    Lymphocytes Absolute 1.23 1.20 - 4.80 x10*3/uL    Monocytes Absolute 0.23 0.10 - 1.00 x10*3/uL    Eosinophils Absolute 0.29 0.00 - 0.70 x10*3/uL    Basophils Absolute 0.03 0.00 - 0.10 x10*3/uL   Lithium level   Result Value Ref Range    Lithium <0.10 (L) 0.60 - 1.20 mmol/L   DRUG SCREEN,URINE   Result Value Ref Range    Amphetamine Screen, Urine Presumptive Positive (A) Presumptive Negative    Barbiturate Screen, Urine Presumptive Negative Presumptive Negative    Benzodiazepines Screen, Urine Presumptive Negative Presumptive Negative    Cannabinoid Screen, Urine Presumptive Positive (A) Presumptive Negative    Cocaine Metabolite Screen, Urine Presumptive Negative Presumptive Negative    Fentanyl Screen, Urine Presumptive Negative Presumptive Negative    Opiate Screen, Urine Presumptive Negative Presumptive Negative    Oxycodone Screen, Urine Presumptive Negative Presumptive Negative    PCP Screen, Urine Presumptive  "Negative Presumptive Negative    Methadone Screen, Urine Presumptive Negative Presumptive Negative   Urinalysis with Reflex Microscopic   Result Value Ref Range    Color, Urine Jenna (N) Straw, Yellow    Appearance, Urine Clear Clear    Specific Gravity, Urine 1.033 1.005 - 1.035    pH, Urine 5.0 5.0, 5.5, 6.0, 6.5, 7.0, 7.5, 8.0    Protein, Urine NEGATIVE NEGATIVE mg/dL    Glucose, Urine NEGATIVE NEGATIVE mg/dL    Blood, Urine NEGATIVE NEGATIVE    Ketones, Urine 5 (TRACE) (A) NEGATIVE mg/dL    Bilirubin, Urine SMALL (1+) (A) NEGATIVE    Urobilinogen, Urine 4.0 (N) <2.0 mg/dL    Nitrite, Urine NEGATIVE NEGATIVE    Leukocyte Esterase, Urine NEGATIVE NEGATIVE          Assessment/Plan   Active Problems:    Schizophrenia (Multi)    Disorientation    Chart review h/o schizophrenia, SIMD, stimulant abuse. Pt arrived for AMS.    Pt seen via telemedicine in live time with auditory & visual abilities. Pt verified their name &  & consented to interview.     EMR reviewed prior to interview. I attempted to interview the pt this morning but he remained too sedated. He did require staff to be present in the room this afternoon so he would stay sitting up & participate in interview.     Pt is minimally cooperative. He reports he \"does not want to be admitted again\" & he \"is not suicidal or homicidal\". Pt confirms meth use. Pt then lays the iPad down on the bed & lays down, not participating in interview.     Pt's initial AMS likely in the setting of acute intoxication on methamphetamines. The pt most recently filled his psychiatric medications 7/3/4; unknown compliance.     Pt appears to be a low/moderate risk of harm to himself & others at baseline due to violent behaviors & reports of fleeting SI without plan or intent; although he denies SI at this time. No acute elevation of risk of harm & no evidence of psychiatric decompensation at this time. Least restrictive treatment environment would be continued outpatient " management.      Impression  SIMD  Stimulant UD     Recommendations  Following a chart review, safety risk assessment, interview with pt and ED staff, pt does not meet criteria for involuntary inpatient psychiatric hospitalization at this time. I am not recommending any medication management changes. Please encourage pt to follow-up with outpatient provider.      I discussed these recommendations with the current ED provider (Dr. Harris) who was in agreement with above plan of care.       Medication Consent  Medication Consent: n/a; consult service    Donna Small, JAMIE-CNP

## 2024-07-16 NOTE — SIGNIFICANT EVENT
Application for Emergency Admission      Ready for Transfer?  Is the patient medically cleared for transfer to inpatient psychiatry: Yes  Has the patient been accepted to an inpatient psychiatric hospital: No    Application for Emergency Admission  IN ACCORDANCE WITH [place]122[place]10 [place]  The Chief Clinical Officer of: unknown 7/15/2024 .8:11 PM    Reason for Hospitalization  The undersigned has reason to believe that: Alexander Zavala Is a mentally ill person subject to hospitalization by court order under division B Section 5122.01 of the Revised Code, i.e., this person:    1.No  Represents a substantial risk of physical harm to self as manifested by evidence of threats of, or attempts at, suicide or serious self-inflicted bodily harm    2.No Represents a substantial risk of physical harm to others as manifested by evidence of recent homicidal or other violent behavior, evidence of recent threats that place another in reasonable fear of violent behavior and serious physical harm, or other evidence of present dangerousness    3.Yes Represents a substantial and immediate risk of serious physical impairment or injury to self as manifested by  evidence that the person is unable to provide for and is not providing for the person's basic physical needs because of the person's mental illness and that appropriate provision for those needs cannot be made  immediately available in the community    4.Yes Would benefit from treatment in a hospital for his mental illness and is in need of such treatment as manifested by evidence of behavior that creates a grave and imminent risk to substantial rights of others or  himself.    5.Yes Would benefit from treatment as manifested by evidence of behavior that indicates all of the following:       (a) The person is unlikely to survive safely in the community without supervision, based on a clinical determination.       (b) The person has a history of lack of compliance with treatment  for mental illness and one of the following applies:      (i) At least twice within the thirty-six months prior to the filing of an affidavit seeking court-ordered treatment of the person under section 5122.111 of the Revised Code, the lack of compliance has been a significant factor in necessitating hospitalization in a hospital or receipt of services in a forensic or other mental health unit of a correctional facility, provided that the thirty-six-month period shall be extended by the length of any hospitalization or incarceration of the person that occurred within the thirty-six-month period.      (ii) Within the forty-eight months prior to the filing of an affidavit seeking court-ordered treatment of the person under section 5122.111 of the Revised Code, the lack of compliance resulted in one or more acts of serious violent behavior toward self or others or threats of, or attempts at, serious physical harm to self or others, provided that the forty-eight-month period shall be extended by the length of any hospitalization or incarceration of the person that occurred within the forty-eight-month period.      (c) The person, as a result of mental illness, is unlikely to voluntarily participate in necessary treatment.       (d) In view of the person's treatment history and current behavior, the person is in need of treatment in order to prevent a relapse or deterioration that would be likely to result in substantial risk of serious harm to the person or others.    (e) Represents a substantial risk of physical harm to self or others if allowed to remain at liberty pending examination.    Therefore, it is requested that said person be admitted to the above named facility.    STATEMENT OF BELIEF    Must be filled out by one of the following: a psychiatrist, licensed physician, licensed clinical psychologist, health or ,  or .  (Statement shall include the circumstances under which the  individual was taken into custody and the reason for the person's belief that hospitalization is necessary. The statement shall also include a reference to efforts made to secure the individual's property at his residence if he was taken into custody there. Every reasonable and appropriate effort should be made to take this person into custody in the least conspicuous manner possible.)         Filiberto Harris MD 7/15/2024     _____________________________________________________________   Place of Employment: [employer]    STATEMENT OF OBSERVATION BY PSYCHIATRIST, LICENSED PHYSICIAN, OR LICENSED CLINICAL PSYCHOLOGIST, IF APPLICABLE    Place of Observation (e.g., [facility], general hospital, office, emergency facility)  (If applicable, please complete)    Filiberto Harris MD 7/15/2024    _____________________________________________________________

## 2024-09-16 NOTE — PROGRESS NOTES
"Subjective   Patient ID: Alexander Zavala is a 39 y.o. male who presents for Follow-up (Slip disk in his back, wants to look into back surgery ).    HPI     Lumbar back pain: Low and left and right sided pain, Its usually there and on the left side. He had xrays   at [facility] before and they told him there was nothing there. Certain times it did hurt, [place] when he was working when he older. He is not taking anything for pain. He is working on getting his insurance figured out. Low back lumbar spine. He would like to see a back specialist, he feels like it is out of place. He is on Zoloft and states he is gaining weight. This making him worse. He has seen a chiropractor in the past in 2013, was not able to help him anymore. Doing stretching at home     States he feels like he is XYZ. Feels that he is woman on top and man on the bottom. States he has more \"yin than his yang. \"He likes to paint nails. Appears like gynecomastia       He has been in the ED multiple times r/t psych issues. He is a poor historian. On review of his chart it appears he has been diagnosed with bipolar and schizophrenia in the past. He is concerned about need for ADHD medications. He was started on mirtazapine and trazodone inpatient after a suicide attempt, psychotic episode. He is unable to say if he has seen a psychiatrist recently or who he would like to see. While in the office, he states that he is unsure if he even needs medications, and he complains that a lot of medications have bad side effects, like suicidal urges and weight gain.  States that he \"Has his own self worth.\"   Self employed, he has medicaid.      Going to community counseling and [facility]. In the process of switching. Needs help with housing.      He denies active chest pain, n/v/d, constipation, urinary symptoms, fever/chills.     All other systems have been reviewed and are negative for complaint   "   SocialHx: smokes 1ppd sometimes less or more, occasional alcohol use, does not use recreational drugs, one probation for his suicide attempt, unemployed right now  MedHx: HLD, insomnia, schizophrenia  Medications: atorvastatin, mirtazapine, trazodone  SurgHx: None  FHx: brother with schizophrenia, father  of dementia, mother is healthy  Allergies: NKDA     Review of Systems    Objective   /78 (BP Location: Right arm, Patient Position: Sitting, BP Cuff Size: Large adult)   Pulse 110   Wt 102 kg (225 lb 9.6 oz)   BMI 30.60 kg/m²     Physical Exam  Constitutional:       Appearance: Normal appearance.   Cardiovascular:      Rate and Rhythm: Normal rate and regular rhythm.   Pulmonary:      Effort: Pulmonary effort is normal.      Breath sounds: Normal breath sounds.   Skin:     General: Skin is warm and dry.   Neurological:      Mental Status: He is alert and oriented to person, place, and time. Mental status is at baseline.   Psychiatric:         Mood and Affect: Mood normal.         Behavior: Behavior normal.         Assessment/Plan       #Back Pain   Xray Lumbar    He states that he was told he had a slipped disc   He wants to see a back specialist for this   Referral placed today   Call with any issues   Continue supportive care      #h/o hyperlipidemia:  unclear if he is taking the statin  we will check his lipid panel  other recent bloodwork within normal limits     #Insomnia:  continue trazodone     #Bipolar disorder  #Schizophrenia  #possible ADHD:  Following with [facility]    importance of counselling and continuing his medications

## 2024-09-18 ENCOUNTER — APPOINTMENT (OUTPATIENT)
Dept: PRIMARY CARE | Facility: CLINIC | Age: 39
End: 2024-09-18
Payer: MEDICARE

## 2024-09-18 VITALS
SYSTOLIC BLOOD PRESSURE: 159 MMHG | BODY MASS INDEX: 30.6 KG/M2 | HEART RATE: 110 BPM | WEIGHT: 225.6 LBS | DIASTOLIC BLOOD PRESSURE: 78 MMHG

## 2024-09-18 DIAGNOSIS — M54.50 CHRONIC MIDLINE LOW BACK PAIN WITHOUT SCIATICA: ICD-10-CM

## 2024-09-18 DIAGNOSIS — G89.29 CHRONIC MIDLINE LOW BACK PAIN WITHOUT SCIATICA: ICD-10-CM

## 2024-09-18 DIAGNOSIS — Z00.00 ANNUAL PHYSICAL EXAM: Primary | ICD-10-CM

## 2024-09-18 PROCEDURE — 99395 PREV VISIT EST AGE 18-39: CPT | Performed by: REGISTERED NURSE

## 2024-09-18 RX ORDER — FLUOXETINE HYDROCHLORIDE 40 MG/1
CAPSULE ORAL
COMMUNITY
Start: 2024-07-03

## 2024-09-18 RX ORDER — TRAZODONE HYDROCHLORIDE 100 MG/1
TABLET ORAL
COMMUNITY
Start: 2024-04-24

## 2024-09-18 RX ORDER — ARIPIPRAZOLE 15 MG/1
TABLET ORAL
COMMUNITY
Start: 2024-04-24

## 2024-09-18 RX ORDER — ARIPIPRAZOLE 10 MG/1
TABLET ORAL
COMMUNITY
Start: 2024-07-03

## 2024-09-18 NOTE — PATIENT INSTRUCTIONS
Orthopedics:   Laci Adkins () [phone]  Juan Carlos Sinclair () [phone]  Long Chua () [phone]  [facility] [phone]  [facility]: [phone]  -Dr. Flowers-any  -Dr. Senior, Dr. Gilmore- spine  -Dr. Sales- upper extremitys, hand  Joseph Bazan () [phone]  Alf Rowley ([facility]) [phone]

## 2024-10-03 ENCOUNTER — TELEPHONE (OUTPATIENT)
Dept: PRIMARY CARE | Facility: CLINIC | Age: 39
End: 2024-10-03
Payer: COMMERCIAL

## 2024-10-03 NOTE — TELEPHONE ENCOUNTER
Pt called in stating he went to [facility] and they told him they would not help him with his back problem so he was wondering if he could get some where else to go like some where for a back injection.

## 2024-10-04 ENCOUNTER — APPOINTMENT (OUTPATIENT)
Dept: ORTHOPEDIC SURGERY | Facility: CLINIC | Age: 39
End: 2024-10-04
Payer: COMMERCIAL

## 2024-10-07 NOTE — TELEPHONE ENCOUNTER
Okay I would put in a referral for pain management and give him the numbers for providers in our area.   Pain Management:   Long Amanda and Emily Campos () [phone]  Jaspreet Leger () [phone]  Mario Verduzco and John Gore ([facility]) [phone]  [facility] [phone]

## 2024-10-15 ENCOUNTER — APPOINTMENT (OUTPATIENT)
Dept: CARDIOLOGY | Facility: HOSPITAL | Age: 39
End: 2024-10-15
Payer: MEDICARE

## 2024-10-15 ENCOUNTER — HOSPITAL ENCOUNTER (EMERGENCY)
Facility: HOSPITAL | Age: 39
Discharge: HOME | End: 2024-10-15
Attending: FAMILY MEDICINE
Payer: MEDICARE

## 2024-10-15 VITALS
DIASTOLIC BLOOD PRESSURE: 86 MMHG | TEMPERATURE: 99.7 F | OXYGEN SATURATION: 98 % | HEART RATE: 85 BPM | SYSTOLIC BLOOD PRESSURE: 130 MMHG | BODY MASS INDEX: 28.37 KG/M2 | WEIGHT: 209.44 LBS | RESPIRATION RATE: 18 BRPM | HEIGHT: 72 IN

## 2024-10-15 DIAGNOSIS — F19.10 SUBSTANCE ABUSE (MULTI): Primary | ICD-10-CM

## 2024-10-15 DIAGNOSIS — F20.9 SCHIZOPHRENIA, UNSPECIFIED TYPE: ICD-10-CM

## 2024-10-15 LAB
APPEARANCE UR: CLEAR
BASOPHILS # BLD AUTO: 0.06 X10*3/UL (ref 0–0.1)
BASOPHILS NFR BLD AUTO: 0.6 %
BILIRUB UR STRIP.AUTO-MCNC: NEGATIVE MG/DL
COLOR UR: YELLOW
EOSINOPHIL # BLD AUTO: 0.51 X10*3/UL (ref 0–0.7)
EOSINOPHIL NFR BLD AUTO: 5.5 %
ERYTHROCYTE [DISTWIDTH] IN BLOOD BY AUTOMATED COUNT: 12.8 % (ref 11.5–14.5)
GLUCOSE UR STRIP.AUTO-MCNC: NEGATIVE MG/DL
HCT VFR BLD AUTO: 50.8 % (ref 41–52)
HGB BLD-MCNC: 17.3 G/DL (ref 13.5–17.5)
HOLD SPECIMEN: NORMAL
IMM GRANULOCYTES # BLD AUTO: 0.02 X10*3/UL (ref 0–0.7)
IMM GRANULOCYTES NFR BLD AUTO: 0.2 % (ref 0–0.9)
KETONES UR STRIP.AUTO-MCNC: ABNORMAL MG/DL
LEUKOCYTE ESTERASE UR QL STRIP.AUTO: NEGATIVE
LYMPHOCYTES # BLD AUTO: 1.43 X10*3/UL (ref 1.2–4.8)
LYMPHOCYTES NFR BLD AUTO: 15.3 %
MCH RBC QN AUTO: 30.8 PG (ref 26–34)
MCHC RBC AUTO-ENTMCNC: 34.1 G/DL (ref 32–36)
MCV RBC AUTO: 90 FL (ref 80–100)
MONOCYTES # BLD AUTO: 0.5 X10*3/UL (ref 0.1–1)
MONOCYTES NFR BLD AUTO: 5.3 %
NEUTROPHILS # BLD AUTO: 6.83 X10*3/UL (ref 1.2–7.7)
NEUTROPHILS NFR BLD AUTO: 73.1 %
NITRITE UR QL STRIP.AUTO: NEGATIVE
NRBC BLD-RTO: 0 /100 WBCS (ref 0–0)
PH UR STRIP.AUTO: 5 [PH]
PLATELET # BLD AUTO: 288 X10*3/UL (ref 150–450)
PROT UR STRIP.AUTO-MCNC: NEGATIVE MG/DL
RBC # BLD AUTO: 5.62 X10*6/UL (ref 4.5–5.9)
RBC # UR STRIP.AUTO: NEGATIVE /UL
SP GR UR STRIP.AUTO: 1.02
UROBILINOGEN UR STRIP.AUTO-MCNC: 2 MG/DL
WBC # BLD AUTO: 9.4 X10*3/UL (ref 4.4–11.3)

## 2024-10-15 PROCEDURE — 81003 URINALYSIS AUTO W/O SCOPE: CPT | Performed by: FAMILY MEDICINE

## 2024-10-15 PROCEDURE — 85025 COMPLETE CBC W/AUTO DIFF WBC: CPT | Performed by: FAMILY MEDICINE

## 2024-10-15 PROCEDURE — 36415 COLL VENOUS BLD VENIPUNCTURE: CPT | Performed by: FAMILY MEDICINE

## 2024-10-15 PROCEDURE — 99283 EMERGENCY DEPT VISIT LOW MDM: CPT

## 2024-10-15 PROCEDURE — 93005 ELECTROCARDIOGRAM TRACING: CPT

## 2024-10-15 SDOH — HEALTH STABILITY: MENTAL HEALTH: BEHAVIORS/MOOD: HALLUCINATIONS;CALM;COOPERATIVE

## 2024-10-15 SDOH — HEALTH STABILITY: MENTAL HEALTH: BEHAVIORAL HEALTH(WDL): EXCEPTIONS TO WDL

## 2024-10-15 ASSESSMENT — PAIN SCALES - GENERAL: PAINLEVEL_OUTOF10: 0 - NO PAIN

## 2024-10-15 ASSESSMENT — PAIN - FUNCTIONAL ASSESSMENT: PAIN_FUNCTIONAL_ASSESSMENT: 0-10

## 2024-10-15 NOTE — ED PROVIDER NOTES
HPI   Chief Complaint   Patient presents with    Psychiatric Evaluation       39-year-old male with history of polysubstance abuse and schizophrenia dropped off to the ED by his landlord after argue with him about random events.  He proceeded to sit in the lobby and not sign in and eat some food and was smiling and interacting with passerby's.  Patient shortly thereafter left and phone call was received by police that patient was wandering around downtown and laying on the concrete near a store.  Patient was brought into the ED for evaluation.  Patient upon arrival to the ED was alert, cooperative, and did not appear to be in distress.  Patient corroborated report that was provided and states that he was lying around because he had nothing to do exam nowhere else to go.  Patient reports this time he does not want harm himself or others and should try to figure out what he needs to do for himself tonight.  Patient requesting something to eat and some time to rest.  Patient currently denies SI, HI, hallucinations, and any physical complaints.  Patient does report history of doing meth but has not done any today.      History provided by:  Patient, medical records and EMS personnel   used: No            Patient History   Past Medical History:   Diagnosis Date    Drug use     Schizophrenia      History reviewed. No pertinent surgical history.  No family history on file.  Social History     Tobacco Use    Smoking status: Every Day     Types: Cigarettes    Smokeless tobacco: Never   Vaping Use    Vaping status: Some Days   Substance Use Topics    Alcohol use: Not on file    Drug use: Not Currently     Types: Methamphetamines, Marijuana       Physical Exam   ED Triage Vitals [10/15/24 1526]   Temperature Heart Rate Respirations BP   37.6 °C (99.7 °F) 85 18 130/86      SpO2 Temp src Heart Rate Source Patient Position   98 % -- -- --      BP Location FiO2 (%)     -- --       Physical Exam  Vitals and  nursing note reviewed.   Constitutional:       General: He is not in acute distress.     Appearance: He is well-developed.   HENT:      Head: Normocephalic and atraumatic.   Eyes:      Conjunctiva/sclera: Conjunctivae normal.   Cardiovascular:      Rate and Rhythm: Normal rate and regular rhythm.      Heart sounds: No murmur heard.  Pulmonary:      Effort: Pulmonary effort is normal. No respiratory distress.      Breath sounds: Normal breath sounds.   Abdominal:      Palpations: Abdomen is soft.      Tenderness: There is no abdominal tenderness.   Musculoskeletal:         General: No swelling.      Cervical back: Neck supple.   Skin:     General: Skin is warm and dry.      Capillary Refill: Capillary refill takes less than 2 seconds.   Neurological:      General: No focal deficit present.      Mental Status: He is alert and oriented to person, place, and time.      GCS: GCS eye subscore is 4. GCS verbal subscore is 5. GCS motor subscore is 6.   Psychiatric:         Attention and Perception: Attention and perception normal.         Mood and Affect: Mood normal. Affect is flat.         Speech: Speech is delayed.         Behavior: Behavior normal. Behavior is cooperative.         Thought Content: Thought content normal.         Cognition and Memory: Cognition and memory normal.         Judgment: Judgment is impulsive.           ED Course & MDM   Diagnoses as of 10/15/24 1719   Substance abuse (Multi)   Schizophrenia, unspecified type                 No data recorded     Shannan Coma Scale Score: 15 (10/15/24 1534 : Jeannette Puentes RN)                         Labs Reviewed   URINALYSIS WITH REFLEX CULTURE AND MICROSCOPIC - Abnormal       Result Value    Color, Urine Yellow      Appearance, Urine Clear      Specific Gravity, Urine 1.024      pH, Urine 5.0      Protein, Urine NEGATIVE      Glucose, Urine NEGATIVE      Blood, Urine NEGATIVE      Ketones, Urine 5 (TRACE) (*)     Bilirubin, Urine NEGATIVE      Urobilinogen,  Urine 2.0 (*)     Nitrite, Urine NEGATIVE      Leukocyte Esterase, Urine NEGATIVE     CBC WITH AUTO DIFFERENTIAL    WBC 9.4      nRBC 0.0      RBC 5.62      Hemoglobin 17.3      Hematocrit 50.8      MCV 90      MCH 30.8      MCHC 34.1      RDW 12.8      Platelets 288      Neutrophils % 73.1      Immature Granulocytes %, Automated 0.2      Lymphocytes % 15.3      Monocytes % 5.3      Eosinophils % 5.5      Basophils % 0.6      Neutrophils Absolute 6.83      Immature Granulocytes Absolute, Automated 0.02      Lymphocytes Absolute 1.43      Monocytes Absolute 0.50      Eosinophils Absolute 0.51      Basophils Absolute 0.06     GRAY TOP    Extra Tube Hold for add-ons.     URINALYSIS WITH REFLEX CULTURE AND MICROSCOPIC    Narrative:     The following orders were created for panel order Urinalysis with Reflex Culture and Microscopic.  Procedure                               Abnormality         Status                     ---------                               -----------         ------                     Urinalysis with Reflex C...[[id number]]  Abnormal            Final result               Extra Urine Gray Tube[[id number]]                            In process                   Please view results for these tests on the individual orders.   EXTRA URINE GRAY TUBE       Medical Decision Making  Pt upon arrival to the ED appeared to be comfortable and in no distress with stable vitals.  Discussed with pt the presenting complaints and clinically findings.  Reviewed pt's epic chart and counseled the patient on polysubstance abuse and appropriate approach to management/treatments.  After assessment and evaluation and continue be cooperative with staff and exhibiting no erratic behavior or bizarre behavior.  Patient continued to deny any kind of SI or HI and further counseling was provided the patient regarding substance abuse and need for cessation of his recurrent meth abuse.  Patient Brother was notified and agreed to come  pick the patient up.  Patient at this time continues not exhibit concerns regarding need for admission for threat to himself or others but continued to be encouraged to comply with follow-up with psychiatrist as well as his home medications.  At this point patient continue did not exhibit concerns regarding need for admission for psychiatric care and advised again to comply with recommendations regarding appropriate follow-up care.  At this time patient was discharged home.    Amount and/or Complexity of Data Reviewed  Independent Historian: EMS  External Data Reviewed: labs and notes.  Labs: ordered. Decision-making details documented in ED Course.        Procedure  Procedures     Paxton Rodríguez MD  10/15/24 3336

## 2024-10-16 LAB
ATRIAL RATE: 90 BPM
HOLD SPECIMEN: NORMAL
P AXIS: 78 DEGREES
P OFFSET: 200 MS
P ONSET: 146 MS
PR INTERVAL: 142 MS
Q ONSET: 217 MS
QRS COUNT: 15 BEATS
QRS DURATION: 84 MS
QT INTERVAL: 354 MS
QTC CALCULATION(BAZETT): 433 MS
QTC FREDERICIA: 405 MS
R AXIS: 74 DEGREES
T AXIS: 62 DEGREES
T OFFSET: 394 MS
VENTRICULAR RATE: 90 BPM

## 2024-10-25 ENCOUNTER — HOSPITAL ENCOUNTER (EMERGENCY)
Facility: HOSPITAL | Age: 39
Discharge: OTHER INSTITUTION | End: 2024-10-26
Attending: EMERGENCY MEDICINE
Payer: MEDICARE

## 2024-10-25 ENCOUNTER — APPOINTMENT (OUTPATIENT)
Dept: CARDIOLOGY | Facility: HOSPITAL | Age: 39
End: 2024-10-25
Payer: MEDICARE

## 2024-10-25 DIAGNOSIS — F12.10 MARIJUANA ABUSE, CONTINUOUS: ICD-10-CM

## 2024-10-25 DIAGNOSIS — F32.A DEPRESSION, UNSPECIFIED DEPRESSION TYPE: ICD-10-CM

## 2024-10-25 DIAGNOSIS — F15.10 METHAMPHETAMINE ABUSE (MULTI): Primary | ICD-10-CM

## 2024-10-25 LAB
ALBUMIN SERPL BCP-MCNC: 4 G/DL (ref 3.4–5)
ALP SERPL-CCNC: 71 U/L (ref 33–120)
ALT SERPL W P-5'-P-CCNC: 23 U/L (ref 10–52)
AMPHETAMINES UR QL SCN: ABNORMAL
ANION GAP SERPL CALC-SCNC: 9 MMOL/L (ref 10–20)
APAP SERPL-MCNC: <10 UG/ML
APPEARANCE UR: CLEAR
AST SERPL W P-5'-P-CCNC: 20 U/L (ref 9–39)
BARBITURATES UR QL SCN: ABNORMAL
BASOPHILS # BLD AUTO: 0.05 X10*3/UL (ref 0–0.1)
BASOPHILS NFR BLD AUTO: 0.8 %
BENZODIAZ UR QL SCN: ABNORMAL
BILIRUB SERPL-MCNC: 0.4 MG/DL (ref 0–1.2)
BILIRUB UR STRIP.AUTO-MCNC: NEGATIVE MG/DL
BUN SERPL-MCNC: 11 MG/DL (ref 6–23)
BZE UR QL SCN: ABNORMAL
CALCIUM SERPL-MCNC: 9 MG/DL (ref 8.6–10.3)
CANNABINOIDS UR QL SCN: ABNORMAL
CHLORIDE SERPL-SCNC: 106 MMOL/L (ref 98–107)
CO2 SERPL-SCNC: 28 MMOL/L (ref 21–32)
COLOR UR: YELLOW
CREAT SERPL-MCNC: 0.83 MG/DL (ref 0.5–1.3)
EGFRCR SERPLBLD CKD-EPI 2021: >90 ML/MIN/1.73M*2
EOSINOPHIL # BLD AUTO: 0.45 X10*3/UL (ref 0–0.7)
EOSINOPHIL NFR BLD AUTO: 7.4 %
ERYTHROCYTE [DISTWIDTH] IN BLOOD BY AUTOMATED COUNT: 12.7 % (ref 11.5–14.5)
ETHANOL SERPL-MCNC: <10 MG/DL
FENTANYL+NORFENTANYL UR QL SCN: ABNORMAL
GLUCOSE SERPL-MCNC: 71 MG/DL (ref 74–99)
GLUCOSE UR STRIP.AUTO-MCNC: NEGATIVE MG/DL
HCT VFR BLD AUTO: 50.4 % (ref 41–52)
HGB BLD-MCNC: 16.9 G/DL (ref 13.5–17.5)
HOLD SPECIMEN: NORMAL
IMM GRANULOCYTES # BLD AUTO: 0.01 X10*3/UL (ref 0–0.7)
IMM GRANULOCYTES NFR BLD AUTO: 0.2 % (ref 0–0.9)
INR PPP: 1.2 (ref 0.9–1.1)
KETONES UR STRIP.AUTO-MCNC: NEGATIVE MG/DL
LACTATE SERPL-SCNC: 1.7 MMOL/L (ref 0.4–2)
LEUKOCYTE ESTERASE UR QL STRIP.AUTO: NEGATIVE
LYMPHOCYTES # BLD AUTO: 1.5 X10*3/UL (ref 1.2–4.8)
LYMPHOCYTES NFR BLD AUTO: 24.7 %
MCH RBC QN AUTO: 30.7 PG (ref 26–34)
MCHC RBC AUTO-ENTMCNC: 33.5 G/DL (ref 32–36)
MCV RBC AUTO: 92 FL (ref 80–100)
METHADONE UR QL SCN: ABNORMAL
MONOCYTES # BLD AUTO: 0.39 X10*3/UL (ref 0.1–1)
MONOCYTES NFR BLD AUTO: 6.4 %
NEUTROPHILS # BLD AUTO: 3.68 X10*3/UL (ref 1.2–7.7)
NEUTROPHILS NFR BLD AUTO: 60.5 %
NITRITE UR QL STRIP.AUTO: NEGATIVE
NRBC BLD-RTO: 0 /100 WBCS (ref 0–0)
OPIATES UR QL SCN: ABNORMAL
OXYCODONE+OXYMORPHONE UR QL SCN: ABNORMAL
PCP UR QL SCN: ABNORMAL
PH UR STRIP.AUTO: 5 [PH]
PLATELET # BLD AUTO: 267 X10*3/UL (ref 150–450)
POTASSIUM SERPL-SCNC: 3.7 MMOL/L (ref 3.5–5.3)
PROT SERPL-MCNC: 7 G/DL (ref 6.4–8.2)
PROT UR STRIP.AUTO-MCNC: NEGATIVE MG/DL
PROTHROMBIN TIME: 13.1 SECONDS (ref 9.8–12.8)
RBC # BLD AUTO: 5.51 X10*6/UL (ref 4.5–5.9)
RBC # UR STRIP.AUTO: NEGATIVE /UL
SALICYLATES SERPL-MCNC: <3 MG/DL
SODIUM SERPL-SCNC: 139 MMOL/L (ref 136–145)
SP GR UR STRIP.AUTO: 1.02
UROBILINOGEN UR STRIP.AUTO-MCNC: 2 MG/DL
WBC # BLD AUTO: 6.1 X10*3/UL (ref 4.4–11.3)

## 2024-10-25 PROCEDURE — 80307 DRUG TEST PRSMV CHEM ANLYZR: CPT | Performed by: EMERGENCY MEDICINE

## 2024-10-25 PROCEDURE — 85025 COMPLETE CBC W/AUTO DIFF WBC: CPT | Performed by: EMERGENCY MEDICINE

## 2024-10-25 PROCEDURE — 83605 ASSAY OF LACTIC ACID: CPT | Performed by: EMERGENCY MEDICINE

## 2024-10-25 PROCEDURE — 93005 ELECTROCARDIOGRAM TRACING: CPT

## 2024-10-25 PROCEDURE — 96372 THER/PROPH/DIAG INJ SC/IM: CPT | Performed by: EMERGENCY MEDICINE

## 2024-10-25 PROCEDURE — 80143 DRUG ASSAY ACETAMINOPHEN: CPT | Performed by: EMERGENCY MEDICINE

## 2024-10-25 PROCEDURE — 36415 COLL VENOUS BLD VENIPUNCTURE: CPT | Performed by: EMERGENCY MEDICINE

## 2024-10-25 PROCEDURE — 99285 EMERGENCY DEPT VISIT HI MDM: CPT | Mod: 25

## 2024-10-25 PROCEDURE — 85610 PROTHROMBIN TIME: CPT | Performed by: EMERGENCY MEDICINE

## 2024-10-25 PROCEDURE — 81003 URINALYSIS AUTO W/O SCOPE: CPT | Performed by: EMERGENCY MEDICINE

## 2024-10-25 PROCEDURE — 2500000004 HC RX 250 GENERAL PHARMACY W/ HCPCS (ALT 636 FOR OP/ED): Performed by: EMERGENCY MEDICINE

## 2024-10-25 PROCEDURE — 80053 COMPREHEN METABOLIC PANEL: CPT | Performed by: EMERGENCY MEDICINE

## 2024-10-25 PROCEDURE — 80179 DRUG ASSAY SALICYLATE: CPT | Performed by: EMERGENCY MEDICINE

## 2024-10-25 PROCEDURE — 82077 ASSAY SPEC XCP UR&BREATH IA: CPT | Performed by: EMERGENCY MEDICINE

## 2024-10-25 RX ORDER — LORAZEPAM 2 MG/ML
1 INJECTION INTRAMUSCULAR ONCE
Status: COMPLETED | OUTPATIENT
Start: 2024-10-25 | End: 2024-10-25

## 2024-10-25 RX ORDER — DIPHENHYDRAMINE HYDROCHLORIDE 50 MG/ML
50 INJECTION INTRAMUSCULAR; INTRAVENOUS ONCE
Status: COMPLETED | OUTPATIENT
Start: 2024-10-25 | End: 2024-10-25

## 2024-10-25 RX ORDER — HALOPERIDOL 5 MG/ML
5 INJECTION INTRAMUSCULAR ONCE
Status: COMPLETED | OUTPATIENT
Start: 2024-10-25 | End: 2024-10-25

## 2024-10-25 RX ADMIN — HALOPERIDOL LACTATE 5 MG: 5 INJECTION, SOLUTION INTRAMUSCULAR at 19:32

## 2024-10-25 RX ADMIN — LORAZEPAM 1 MG: 2 INJECTION INTRAMUSCULAR; INTRAVENOUS at 19:32

## 2024-10-25 RX ADMIN — DIPHENHYDRAMINE HYDROCHLORIDE 50 MG: 50 INJECTION INTRAMUSCULAR; INTRAVENOUS at 19:32

## 2024-10-25 SDOH — HEALTH STABILITY: MENTAL HEALTH: BEHAVIORS/MOOD: AGITATED;ANXIOUS

## 2024-10-25 SDOH — HEALTH STABILITY: MENTAL HEALTH: BEHAVIORAL HEALTH(WDL): EXCEPTIONS TO WDL

## 2024-10-25 ASSESSMENT — PAIN SCALES - GENERAL: PAINLEVEL_OUTOF10: 4

## 2024-10-25 ASSESSMENT — COLUMBIA-SUICIDE SEVERITY RATING SCALE - C-SSRS
5. HAVE YOU STARTED TO WORK OUT OR WORKED OUT THE DETAILS OF HOW TO KILL YOURSELF? DO YOU INTEND TO CARRY OUT THIS PLAN?: NO
1. IN THE PAST MONTH, HAVE YOU WISHED YOU WERE DEAD OR WISHED YOU COULD GO TO SLEEP AND NOT WAKE UP?: YES
4. HAVE YOU HAD THESE THOUGHTS AND HAD SOME INTENTION OF ACTING ON THEM?: NO
2. HAVE YOU ACTUALLY HAD ANY THOUGHTS OF KILLING YOURSELF?: YES
6. HAVE YOU EVER DONE ANYTHING, STARTED TO DO ANYTHING, OR PREPARED TO DO ANYTHING TO END YOUR LIFE?: YES

## 2024-10-25 ASSESSMENT — PAIN - FUNCTIONAL ASSESSMENT: PAIN_FUNCTIONAL_ASSESSMENT: 0-10

## 2024-10-25 NOTE — ED PROVIDER NOTES
"[facility]   ED  Provider Note  10/25/2024  3:21 PM  AC01/AC01      Chief Complaint   Patient presents with    Suicidal     No plan, states \"wants to die\"        History of Present Illness:   Alexander Zavala is a 39 y.o. male presenting to the ED for depression, beginning this morning.  The complaint has been persistent, moderate in severity, and worsened by nothing.  Patient states he has been depressed and this morning he just wants to die.  He does not answer questions well at this time.  He does not appear to have a fixed  plan.  He has had multiple similar presentations to this ER in the past.  He has a history of drug abuse and schizophrenia.  He has been positive methamphetamine in the past.  He has been noncompliant with his psych medications in the past.  He will not answer questions about compliance at this time.      Review of Systems:   Pertinent positives and review of systems as noted above.  Remaining 10 review of systems is negative or noncontributory to today's episode of care.  Review of Systems       --------------------------------------------- PAST HISTORY ---------------------------------------------  Past Medical History:   Past Medical History:   Diagnosis Date    Drug use     Schizophrenia         Past Surgical History: History reviewed. No pertinent surgical history.     Social History:   Social History     Social History Narrative    Not on file        Family History: family history is not on file. Unless otherwise noted, family history is non contributory    Patient's Medications   New Prescriptions    No medications on file   Previous Medications    ARIPIPRAZOLE (ABILIFY) 10 MG TABLET        ARIPIPRAZOLE (ABILIFY) 15 MG TABLET    take 1 tablet by mouth twice a day with breakfast and dinner for MOOD / PSYCHOSIS    FLUOXETINE (PROZAC) 20 MG CAPSULE    Take 3 capsules (60 mg) by mouth once daily. Do not start before November 23, 2023.    FLUOXETINE (PROZAC) 40 MG CAPSULE        HALOPERIDOL " (HALDOL) 5 MG TABLET    Take 1 tablet (5 mg) by mouth 2 times a day.    HYDROXYZINE PAMOATE (VISTARIL) 50 MG CAPSULE    Take 1 capsule (50 mg) by mouth 3 times a day as needed for anxiety for up to 15 days.    LITHIUM ER (ESKALITH) 450 MG 12 HR TABLET    Take 1 tablet (450 mg) by mouth once daily at bedtime. Do not crush, chew, or split.    LITHIUM ER (LITHOBID) 300 MG 12 HR TABLET    Take 1 tablet (300 mg) by mouth once daily. Do not crush, chew, or split.  Take every morning Do not start before November 23, 2023.    NICOTINE POLACRILEX (NICORETTE) 4 MG GUM    Chew 1 each (4 mg) every 6 hours if needed for smoking cessation (nicotine craving, urge to smoke).    OLANZAPINE (ZYPREXA) 15 MG TABLET    Take 2 tablets (30 mg) by mouth once daily at bedtime.    PRAZOSIN (MINIPRESS) 1 MG CAPSULE    Take 1 capsule (1 mg) by mouth 2 times a day.    TRAZODONE (DESYREL) 100 MG TABLET    take 1 tablet by mouth daily at bedtime if needed for insomnia   Modified Medications    No medications on file   Discontinued Medications    No medications on file      The patient’s home medications have been reviewed.    Allergies: Patient has no known allergies.    -------------------------------------------------- RESULTS -------------------------------------------------  All laboratory and radiology results have been personally reviewed by myself   LABS:  Labs Reviewed   COMPREHENSIVE METABOLIC PANEL - Abnormal       Result Value    Glucose 71 (*)     Sodium 139      Potassium 3.7      Chloride 106      Bicarbonate 28      Anion Gap 9 (*)     Urea Nitrogen 11      Creatinine 0.83      eGFR >90      Calcium 9.0      Albumin 4.0      Alkaline Phosphatase 71      Total Protein 7.0      AST 20      Bilirubin, Total 0.4      ALT 23     DRUG SCREEN,URINE - Abnormal    Amphetamine Screen, Urine Presumptive Positive (*)     Barbiturate Screen, Urine Presumptive Negative      Benzodiazepines Screen, Urine Presumptive Negative      Cannabinoid  Screen, Urine Presumptive Positive (*)     Cocaine Metabolite Screen, Urine Presumptive Negative      Fentanyl Screen, Urine Presumptive Negative      Opiate Screen, Urine Presumptive Negative      Oxycodone Screen, Urine Presumptive Negative      PCP Screen, Urine Presumptive Negative      Methadone Screen, Urine Presumptive Negative      Narrative:     Drug screen results are presumptive and should not be used to assess   compliance with prescribed medication. Contact the performing [facility] laboratory   to add-on definitive confirmatory testing if clinically indicated.    Toxicology screening results are reported qualitatively. The concentration must   be greater than or equal to the cutoff to be reported as positive. The concentration   at which the screening test can detect an individual drug or metabolite varies.   The absence of expected drug(s) and/or drug metabolite(s) may indicate non-compliance,   inappropriate timing of specimen collection relative to drug administration, poor drug   absorption, diluted/adulterated urine, or limitations of testing. For medical purposes   only; not valid for forensic use.    Interpretive questions should be directed to the laboratory medical directors.   URINALYSIS WITH REFLEX CULTURE AND MICROSCOPIC - Abnormal    Color, Urine Yellow      Appearance, Urine Clear      Specific Gravity, Urine 1.017      pH, Urine 5.0      Protein, Urine NEGATIVE      Glucose, Urine NEGATIVE      Blood, Urine NEGATIVE      Ketones, Urine NEGATIVE      Bilirubin, Urine NEGATIVE      Urobilinogen, Urine 2.0 (*)     Nitrite, Urine NEGATIVE      Leukocyte Esterase, Urine NEGATIVE     PROTIME-INR - Abnormal    Protime 13.1 (*)     INR 1.2 (*)    ALCOHOL - Normal    Alcohol <10     LACTATE - Normal    Lactate 1.7      Narrative:     Venipuncture immediately after or during the administration of Metamizole may lead to falsely low results. Testing should be performed immediately prior to Metamizole  dosing.   CBC WITH AUTO DIFFERENTIAL    WBC 6.1      nRBC 0.0      RBC 5.51      Hemoglobin 16.9      Hematocrit 50.4      MCV 92      MCH 30.7      MCHC 33.5      RDW 12.7      Platelets 267      Neutrophils % 60.5      Immature Granulocytes %, Automated 0.2      Lymphocytes % 24.7      Monocytes % 6.4      Eosinophils % 7.4      Basophils % 0.8      Neutrophils Absolute 3.68      Immature Granulocytes Absolute, Automated 0.01      Lymphocytes Absolute 1.50      Monocytes Absolute 0.39      Eosinophils Absolute 0.45      Basophils Absolute 0.05     URINALYSIS WITH REFLEX CULTURE AND MICROSCOPIC    Narrative:     The following orders were created for panel order Urinalysis with Reflex Culture and Microscopic.  Procedure                               Abnormality         Status                     ---------                               -----------         ------                     Urinalysis with Reflex C...[[id number]]  Abnormal            Final result               Extra Urine Gray Tube[[id number]]                            In process                   Please view results for these tests on the individual orders.   EXTRA URINE GRAY TUBE     EKG shows a sinus bradycardia at 54 bpm, normal axis, normal intervals, no acute ST elevations, borderline LVH.  Interpreted by NICO Harris MD    RADIOLOGY:  Interpreted by Radiologist.  No orders to display         ------------------------- NURSING NOTES AND VITALS REVIEWED ---------------------------   The nursing notes within the ED encounter and vital signs as below have been reviewed.   /77   Pulse 83   Temp 36.9 °C (98.5 °F) (Tympanic)   Resp 18   Ht 1.829 m (6')   Wt 93 kg (205 lb)   SpO2 97%   BMI 27.80 kg/m²   Oxygen Saturation Interpretation: Normal      ---------------------------------------------------PHYSICAL EXAM--------------------------------------  Physical Exam   Constitutional/General: Alert,  well appearing, non toxic in NAD  Head: Normocephalic  and atraumatic  Eyes: PERRL, EOMI, conjunctiva normal, sclera non icteric  Mouth: Oropharynx clear, handling secretions, no trismus, no asymmetry of the posterior oropharynx or uvular edema  Neck: Supple, full ROM, non tender to palpation in the midline, no stridor, no crepitus, no meningeal signs  Respiratory: Lungs clear to auscultation bilaterally, no wheezes, rales, or rhonchi. Not in respiratory distress  Cardiovascular:  Regular rate. Regular rhythm. No murmurs, gallops, or rubs. 2+ distal pulses  Chest: No chest wall tenderness  GI:  Abdomen Soft, Non tender, Non distended.  +BS. No organomegaly, no palpable masses,  No rebound, guarding, or rigidity.   Musculoskeletal: Moves all extremities x 4. Warm and well perfused, no clubbing, cyanosis, or edema. Capillary refill <3 seconds  Integument: skin warm and dry. No rashes.   Lymphatic: no lymphadenopathy noted  Neurologic: No focal deficits, symmetric strength 5/5 in the upper and lower extremities bilaterally  Psychiatric: Normal Affect    Procedures    ------------------------------ ED COURSE/MEDICAL DECISION MAKING----------------------  Diagnoses as of 10/25/24 1927   Methamphetamine abuse (Multi)   Marijuana abuse, continuous   Depression, unspecified depression type      1925 patient is medically clear for psychiatric evaluation by the [facility].  His involuntary metformin has been completed.  He was given Haldol Benadryl and Ativan to control his violent outburst.      Medical Decision Making:   Patient is a medically cleared for psychiatric evaluation.  He will be signed to Dr. Pickett pending this evaluation.      Counseling:   The emergency provider has spoken with the patient and discussed today’s results, in addition to providing specific details for the plan of care and counseling regarding the diagnosis and prognosis.  Questions are answered at this time and they are agreeable with the plan.      --------------------------------- IMPRESSION AND  DISPOSITION ---------------------------------        IMPRESSION  1. Methamphetamine abuse (Multi)    2. Marijuana abuse, continuous    3. Depression, unspecified depression type        DISPOSITION  Disposition:  Signed out to Dr. Pickett pending EPAT evaluation  Patient condition is poor      Billing Provider Critical Care Time: 0 minutes     Filiberto Harris MD  10/25/24 4553

## 2024-10-25 NOTE — SIGNIFICANT EVENT
Application for Emergency Admission      Ready for Transfer?  Is the patient medically cleared for transfer to inpatient psychiatry: Yes     has the patient been accepted to an inpatient psychiatric hospital: Yes  Application for Emergency Admission  IN ACCORDANCE WITH [place]  The Chief Clinical Officer of: Dr. Clarke Fox 10/25/2024 .7:19 PM    Reason for Hospitalization  The undersigned has reason to believe that: Alexander Zavala Is a mentally ill person subject to hospitalization by court order under division B Section 5122.01 of the Revised Code, i.e., this person:    1.Yes  Represents a substantial risk of physical harm to self as manifested by evidence of threats of, or attempts at, suicide or serious self-inflicted bodily harm    2.No Represents a substantial risk of physical harm to others as manifested by evidence of recent homicidal or other violent behavior, evidence of recent threats that place another in reasonable fear of violent behavior and serious physical harm, or other evidence of present dangerousness    3.Yes Represents a substantial and immediate risk of serious physical impairment or injury to self as manifested by  evidence that the person is unable to provide for and is not providing for the person's basic physical needs because of the person's mental illness and that appropriate provision for those needs cannot be made  immediately available in the community    4.Yes Would benefit from treatment in a hospital for his mental illness and is in need of such treatment as manifested by evidence of behavior that creates a grave and imminent risk to substantial rights of others or  himself.    5.Yes Would benefit from treatment as manifested by evidence of behavior that indicates all of the following:       (a) The person is unlikely to survive safely in the community without supervision, based on a clinical determination.       (b) The person has a history of lack of  compliance with treatment for mental illness and one of the following applies:      (i) At least twice within the thirty-six months prior to the filing of an affidavit seeking court-ordered treatment of the person under section 5122.111 of the Revised Code, the lack of compliance has been a significant factor in necessitating hospitalization in a hospital or receipt of services in a forensic or other mental health unit of a correctional facility, provided that the thirty-six-month period shall be extended by the length of any hospitalization or incarceration of the person that occurred within the thirty-six-month period.      (ii) Within the forty-eight months prior to the filing of an affidavit seeking court-ordered treatment of the person under section 5122.111 of the Revised Code, the lack of compliance resulted in one or more acts of serious violent behavior toward self or others or threats of, or attempts at, serious physical harm to self or others, provided that the forty-eight-month period shall be extended by the length of any hospitalization or incarceration of the person that occurred within the forty-eight-month period.      (c) The person, as a result of mental illness, is unlikely to voluntarily participate in necessary treatment.       (d) In view of the person's treatment history and current behavior, the person is in need of treatment in order to prevent a relapse or deterioration that would be likely to result in substantial risk of serious harm to the person or others.    (e) Represents a substantial risk of physical harm to self or others if allowed to remain at liberty pending examination.    Therefore, it is requested that said person be admitted to the above named facility.    STATEMENT OF BELIEF    Must be filled out by one of the following: a psychiatrist, licensed physician, licensed clinical psychologist, health or ,  or .  (Statement shall include the  circumstances under which the individual was taken into custody and the reason for the person's belief that hospitalization is necessary. The statement shall also include a reference to efforts made to secure the individual's property at his residence if he was taken into custody there. Every reasonable and appropriate effort should be made to take this person into custody in the least conspicuous manner possible.)    Patient continues to state he wants to die.  He is unsafe for discharge at this time.    Filiberto Harris MD 10/25/2024     _____________________________________________________________   Place of Employment: [facility]    STATEMENT OF OBSERVATION BY PSYCHIATRIST, LICENSED PHYSICIAN, OR LICENSED CLINICAL PSYCHOLOGIST, IF APPLICABLE    Place of Observation (e.g., [facility] center, general hospital, office, emergency facility)  (If applicable, please complete)    Filiberto Harris MD 10/25/2024    _____________________________________________________________

## 2024-10-26 VITALS
OXYGEN SATURATION: 96 % | DIASTOLIC BLOOD PRESSURE: 71 MMHG | WEIGHT: 205 LBS | HEART RATE: 83 BPM | BODY MASS INDEX: 27.77 KG/M2 | RESPIRATION RATE: 16 BRPM | SYSTOLIC BLOOD PRESSURE: 116 MMHG | TEMPERATURE: 97.8 F | HEIGHT: 72 IN

## 2024-10-26 LAB — HOLD SPECIMEN: NORMAL

## 2024-10-26 PROCEDURE — 96372 THER/PROPH/DIAG INJ SC/IM: CPT

## 2024-10-26 PROCEDURE — 2500000004 HC RX 250 GENERAL PHARMACY W/ HCPCS (ALT 636 FOR OP/ED)

## 2024-10-26 RX ORDER — LORAZEPAM 2 MG/ML
INJECTION INTRAMUSCULAR
Status: COMPLETED
Start: 2024-10-26 | End: 2024-10-26

## 2024-10-26 RX ORDER — DIPHENHYDRAMINE HYDROCHLORIDE 50 MG/ML
INJECTION INTRAMUSCULAR; INTRAVENOUS
Status: COMPLETED
Start: 2024-10-26 | End: 2024-10-26

## 2024-10-26 RX ORDER — DIPHENHYDRAMINE HYDROCHLORIDE 50 MG/ML
50 INJECTION INTRAMUSCULAR; INTRAVENOUS ONCE
Status: COMPLETED | OUTPATIENT
Start: 2024-10-26 | End: 2024-10-26

## 2024-10-26 RX ORDER — HALOPERIDOL 5 MG/ML
5 INJECTION INTRAMUSCULAR ONCE
Status: COMPLETED | OUTPATIENT
Start: 2024-10-26 | End: 2024-10-26

## 2024-10-26 RX ORDER — HALOPERIDOL 5 MG/ML
INJECTION INTRAMUSCULAR
Status: COMPLETED
Start: 2024-10-26 | End: 2024-10-26

## 2024-10-26 RX ORDER — LORAZEPAM 2 MG/ML
1 INJECTION INTRAMUSCULAR ONCE
Status: COMPLETED | OUTPATIENT
Start: 2024-10-26 | End: 2024-10-26

## 2024-10-26 RX ADMIN — DIPHENHYDRAMINE HYDROCHLORIDE 50 MG: 50 INJECTION INTRAMUSCULAR; INTRAVENOUS at 17:50

## 2024-10-26 RX ADMIN — HALOPERIDOL LACTATE 5 MG: 5 INJECTION, SOLUTION INTRAMUSCULAR at 17:54

## 2024-10-26 RX ADMIN — HALOPERIDOL 5 MG: 5 INJECTION INTRAMUSCULAR at 17:54

## 2024-10-26 RX ADMIN — LORAZEPAM 1 MG: 2 INJECTION INTRAMUSCULAR at 17:52

## 2024-10-26 RX ADMIN — LORAZEPAM 1 MG: 2 INJECTION INTRAMUSCULAR; INTRAVENOUS at 17:52

## 2024-10-26 ASSESSMENT — PAIN - FUNCTIONAL ASSESSMENT: PAIN_FUNCTIONAL_ASSESSMENT: 0-10

## 2024-10-26 ASSESSMENT — PAIN SCALES - GENERAL: PAINLEVEL_OUTOF10: 0 - NO PAIN

## 2024-10-26 NOTE — PROGRESS NOTES
This 39-year-old patient was seen evaluated and treated by Dr. Harris as well as Dr. Pickett, due to suicidal ideation he also has positive meth, patient initially uncooperative subsequent week and became cooperative EPAT evaluated patient after major patient is medically clear and they recommended patient to be placed.  Subsequently patient was accepted by Dr. Terry at [facility] for admission and inpatient psychiatric care.  Patient waiting to be transferred awaiting bed assignment.  Please refer to Dr. Harris's note for complete history and physical admission treatment plan and disposition plan.  Yonny Jones MD

## 2024-10-26 NOTE — PROGRESS NOTES
"Emergency Medicine Transition of Care Note.    I received Alexander Zavala in signout from   Dr. Harris please see the previous ED provider note for all HPI, PE and MDM up to the time of signout at 2000. This is in addition to the primary record.    In brief Alexander Zavala is an 39 y.o. male presenting for   Chief Complaint   Patient presents with    Suicidal     No plan, states \"wants to die\"     At the time of signout we were awaiting: EPAT consultation    Diagnoses as of 10/26/24 0243   Methamphetamine abuse (Multi)   Marijuana abuse, continuous   Depression, unspecified depression type       Medical Decision Making  I received the patient handoff from Dr. Harris.  He is hemodynamically stable and has been resting, causing no difficulties.  Patient is well-known psychiatric patient who is in again tonight with drug use and wanting to harm himself.  Is currently medically clear for psychiatric evaluation and placement as indicated.        Final diagnoses:   [F15.10] Methamphetamine abuse (Multi)   [F12.10] Marijuana abuse, continuous   [F32.A] Depression, unspecified depression type           Procedure  Procedures    You Pickett MD   "
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
## 2024-10-26 NOTE — DISCHARGE INSTRUCTIONS
Patient transfer has been accepted to [facility] by Dr. Mir, psychiatrist.  Clinically stable being transferred via by ambulance.

## 2024-10-28 PROBLEM — R41.0 DISORIENTATION: Status: RESOLVED | Noted: 2024-07-16 | Resolved: 2024-10-28

## 2024-10-28 PROBLEM — F15.20: Status: ACTIVE | Noted: 2024-01-01

## 2024-10-28 PROBLEM — F22 PSYCHOSIS, PARANOID (MULTI): Status: RESOLVED | Noted: 2023-11-01 | Resolved: 2024-10-28

## 2024-10-28 LAB
ATRIAL RATE: 54 BPM
P AXIS: 65 DEGREES
P OFFSET: 200 MS
P ONSET: 145 MS
PR INTERVAL: 144 MS
Q ONSET: 217 MS
QRS COUNT: 9 BEATS
QRS DURATION: 98 MS
QT INTERVAL: 434 MS
QTC CALCULATION(BAZETT): 411 MS
QTC FREDERICIA: 418 MS
R AXIS: 78 DEGREES
T AXIS: 42 DEGREES
T OFFSET: 434 MS
VENTRICULAR RATE: 54 BPM

## 2024-11-11 ENCOUNTER — APPOINTMENT (OUTPATIENT)
Dept: CARDIOLOGY | Facility: HOSPITAL | Age: 39
End: 2024-11-11
Payer: MEDICARE

## 2024-11-11 ENCOUNTER — HOSPITAL ENCOUNTER (EMERGENCY)
Facility: HOSPITAL | Age: 39
Discharge: HOME | End: 2024-11-11
Attending: EMERGENCY MEDICINE
Payer: MEDICARE

## 2024-11-11 VITALS
SYSTOLIC BLOOD PRESSURE: 122 MMHG | OXYGEN SATURATION: 98 % | WEIGHT: 204.81 LBS | HEART RATE: 84 BPM | DIASTOLIC BLOOD PRESSURE: 84 MMHG | RESPIRATION RATE: 16 BRPM | HEIGHT: 72 IN | BODY MASS INDEX: 27.74 KG/M2 | TEMPERATURE: 97.6 F

## 2024-11-11 DIAGNOSIS — F15.10 AMPHETAMINE ABUSE (MULTI): Primary | ICD-10-CM

## 2024-11-11 LAB
ALBUMIN SERPL BCP-MCNC: 4 G/DL (ref 3.4–5)
ALP SERPL-CCNC: 65 U/L (ref 33–120)
ALT SERPL W P-5'-P-CCNC: 33 U/L (ref 10–52)
AMPHETAMINES UR QL SCN: ABNORMAL
ANION GAP SERPL CALC-SCNC: 10 MMOL/L (ref 10–20)
APPEARANCE UR: CLEAR
AST SERPL W P-5'-P-CCNC: 26 U/L (ref 9–39)
BARBITURATES UR QL SCN: ABNORMAL
BASOPHILS # BLD AUTO: 0.06 X10*3/UL (ref 0–0.1)
BASOPHILS NFR BLD AUTO: 0.8 %
BENZODIAZ UR QL SCN: ABNORMAL
BILIRUB SERPL-MCNC: 0.5 MG/DL (ref 0–1.2)
BILIRUB UR STRIP.AUTO-MCNC: NEGATIVE MG/DL
BUN SERPL-MCNC: 19 MG/DL (ref 6–23)
BZE UR QL SCN: ABNORMAL
CALCIUM SERPL-MCNC: 8.7 MG/DL (ref 8.6–10.3)
CANNABINOIDS UR QL SCN: ABNORMAL
CHLORIDE SERPL-SCNC: 102 MMOL/L (ref 98–107)
CO2 SERPL-SCNC: 27 MMOL/L (ref 21–32)
COLOR UR: YELLOW
CREAT SERPL-MCNC: 0.75 MG/DL (ref 0.5–1.3)
EGFRCR SERPLBLD CKD-EPI 2021: >90 ML/MIN/1.73M*2
EOSINOPHIL # BLD AUTO: 0.31 X10*3/UL (ref 0–0.7)
EOSINOPHIL NFR BLD AUTO: 4.4 %
ERYTHROCYTE [DISTWIDTH] IN BLOOD BY AUTOMATED COUNT: 13.2 % (ref 11.5–14.5)
ETHANOL SERPL-MCNC: <10 MG/DL
FENTANYL+NORFENTANYL UR QL SCN: ABNORMAL
GLUCOSE SERPL-MCNC: 87 MG/DL (ref 74–99)
GLUCOSE UR STRIP.AUTO-MCNC: NEGATIVE MG/DL
HCT VFR BLD AUTO: 47.7 % (ref 41–52)
HGB BLD-MCNC: 16.2 G/DL (ref 13.5–17.5)
HOLD SPECIMEN: NORMAL
IMM GRANULOCYTES # BLD AUTO: 0.03 X10*3/UL (ref 0–0.7)
IMM GRANULOCYTES NFR BLD AUTO: 0.4 % (ref 0–0.9)
INR PPP: 1.1 (ref 0.9–1.1)
KETONES UR STRIP.AUTO-MCNC: NEGATIVE MG/DL
LACTATE SERPL-SCNC: 0.7 MMOL/L (ref 0.4–2)
LEUKOCYTE ESTERASE UR QL STRIP.AUTO: ABNORMAL
LYMPHOCYTES # BLD AUTO: 1.63 X10*3/UL (ref 1.2–4.8)
LYMPHOCYTES NFR BLD AUTO: 23 %
MCH RBC QN AUTO: 30.2 PG (ref 26–34)
MCHC RBC AUTO-ENTMCNC: 34 G/DL (ref 32–36)
MCV RBC AUTO: 89 FL (ref 80–100)
METHADONE UR QL SCN: ABNORMAL
MONOCYTES # BLD AUTO: 0.43 X10*3/UL (ref 0.1–1)
MONOCYTES NFR BLD AUTO: 6.1 %
MUCOUS THREADS #/AREA URNS AUTO: NORMAL /LPF
NEUTROPHILS # BLD AUTO: 4.62 X10*3/UL (ref 1.2–7.7)
NEUTROPHILS NFR BLD AUTO: 65.3 %
NITRITE UR QL STRIP.AUTO: NEGATIVE
NRBC BLD-RTO: 0 /100 WBCS (ref 0–0)
OPIATES UR QL SCN: ABNORMAL
OXYCODONE+OXYMORPHONE UR QL SCN: ABNORMAL
PCP UR QL SCN: ABNORMAL
PH UR STRIP.AUTO: 6 [PH]
PLATELET # BLD AUTO: 273 X10*3/UL (ref 150–450)
POTASSIUM SERPL-SCNC: 4 MMOL/L (ref 3.5–5.3)
PROT SERPL-MCNC: 6.8 G/DL (ref 6.4–8.2)
PROT UR STRIP.AUTO-MCNC: NEGATIVE MG/DL
PROTHROMBIN TIME: 12.3 SECONDS (ref 9.8–12.8)
RBC # BLD AUTO: 5.36 X10*6/UL (ref 4.5–5.9)
RBC # UR STRIP.AUTO: NEGATIVE /UL
RBC #/AREA URNS AUTO: NORMAL /HPF
SODIUM SERPL-SCNC: 135 MMOL/L (ref 136–145)
SP GR UR STRIP.AUTO: 1.02
SQUAMOUS #/AREA URNS AUTO: NORMAL /HPF
UROBILINOGEN UR STRIP.AUTO-MCNC: 2 MG/DL
WBC # BLD AUTO: 7.1 X10*3/UL (ref 4.4–11.3)
WBC #/AREA URNS AUTO: NORMAL /HPF

## 2024-11-11 PROCEDURE — 99284 EMERGENCY DEPT VISIT MOD MDM: CPT

## 2024-11-11 PROCEDURE — 81001 URINALYSIS AUTO W/SCOPE: CPT | Performed by: EMERGENCY MEDICINE

## 2024-11-11 PROCEDURE — 36415 COLL VENOUS BLD VENIPUNCTURE: CPT | Performed by: EMERGENCY MEDICINE

## 2024-11-11 PROCEDURE — 80307 DRUG TEST PRSMV CHEM ANLYZR: CPT | Performed by: EMERGENCY MEDICINE

## 2024-11-11 PROCEDURE — 83605 ASSAY OF LACTIC ACID: CPT | Performed by: EMERGENCY MEDICINE

## 2024-11-11 PROCEDURE — 82077 ASSAY SPEC XCP UR&BREATH IA: CPT | Performed by: EMERGENCY MEDICINE

## 2024-11-11 PROCEDURE — 85610 PROTHROMBIN TIME: CPT | Performed by: EMERGENCY MEDICINE

## 2024-11-11 PROCEDURE — 85025 COMPLETE CBC W/AUTO DIFF WBC: CPT | Performed by: EMERGENCY MEDICINE

## 2024-11-11 PROCEDURE — 93005 ELECTROCARDIOGRAM TRACING: CPT

## 2024-11-11 PROCEDURE — 80053 COMPREHEN METABOLIC PANEL: CPT | Performed by: EMERGENCY MEDICINE

## 2024-11-11 SDOH — HEALTH STABILITY: MENTAL HEALTH: DEPRESSION SYMPTOMS: NO PROBLEMS REPORTED OR OBSERVED.

## 2024-11-11 SDOH — HEALTH STABILITY: MENTAL HEALTH: SUICIDAL BEHAVIOR (LIFETIME): NO

## 2024-11-11 SDOH — HEALTH STABILITY: MENTAL HEALTH: SUICIDAL BEHAVIOR (3 MONTHS): NO

## 2024-11-11 SDOH — HEALTH STABILITY: MENTAL HEALTH: WISH TO BE DEAD (PAST 1 MONTH): NO

## 2024-11-11 SDOH — HEALTH STABILITY: MENTAL HEALTH: ACTIVE SUICIDAL IDEATION WITH SOME INTENT TO ACT, WITHOUT SPECIFIC PLAN (PAST 1 MONTH): NO

## 2024-11-11 SDOH — HEALTH STABILITY: MENTAL HEALTH: ANXIETY SYMPTOMS: NO PROBLEMS REPORTED OR OBSERVED.

## 2024-11-11 SDOH — HEALTH STABILITY: MENTAL HEALTH: ACTIVE SUICIDAL IDEATION WITH SPECIFIC PLAN AND INTENT (PAST 1 MONTH): NO

## 2024-11-11 SDOH — HEALTH STABILITY: MENTAL HEALTH: BEHAVIORAL HEALTH(WDL): EXCEPTIONS TO WDL

## 2024-11-11 SDOH — HEALTH STABILITY: MENTAL HEALTH: NON-SPECIFIC ACTIVE SUICIDAL THOUGHTS (PAST 1 MONTH): NO

## 2024-11-11 SDOH — HEALTH STABILITY: MENTAL HEALTH: BEHAVIORS/MOOD: CALM;OTHER (COMMENT)

## 2024-11-11 ASSESSMENT — LIFESTYLE VARIABLES
PRESCIPTION_ABUSE_PAST_12_MONTHS: YES
SUBSTANCE_ABUSE_PAST_12_MONTHS: YES

## 2024-11-11 ASSESSMENT — COLUMBIA-SUICIDE SEVERITY RATING SCALE - C-SSRS
5. HAVE YOU STARTED TO WORK OUT OR WORKED OUT THE DETAILS OF HOW TO KILL YOURSELF? DO YOU INTEND TO CARRY OUT THIS PLAN?: NO
6. HAVE YOU EVER DONE ANYTHING, STARTED TO DO ANYTHING, OR PREPARED TO DO ANYTHING TO END YOUR LIFE?: YES
1. IN THE PAST MONTH, HAVE YOU WISHED YOU WERE DEAD OR WISHED YOU COULD GO TO SLEEP AND NOT WAKE UP?: NO
4. HAVE YOU HAD THESE THOUGHTS AND HAD SOME INTENTION OF ACTING ON THEM?: NO
1. IN THE PAST MONTH, HAVE YOU WISHED YOU WERE DEAD OR WISHED YOU COULD GO TO SLEEP AND NOT WAKE UP?: YES
2. HAVE YOU ACTUALLY HAD ANY THOUGHTS OF KILLING YOURSELF?: YES
6. HAVE YOU EVER DONE ANYTHING, STARTED TO DO ANYTHING, OR PREPARED TO DO ANYTHING TO END YOUR LIFE?: NO
6. HAVE YOU EVER DONE ANYTHING, STARTED TO DO ANYTHING, OR PREPARED TO DO ANYTHING TO END YOUR LIFE?: NO
2. HAVE YOU ACTUALLY HAD ANY THOUGHTS OF KILLING YOURSELF?: NO

## 2024-11-11 ASSESSMENT — PAIN SCALES - GENERAL
PAINLEVEL_OUTOF10: 0 - NO PAIN
PAINLEVEL_OUTOF10: 0 - NO PAIN

## 2024-11-11 ASSESSMENT — PAIN - FUNCTIONAL ASSESSMENT: PAIN_FUNCTIONAL_ASSESSMENT: 0-10

## 2024-11-11 NOTE — ED PROVIDER NOTES
[facility]   ED  Provider Note  11/11/2024  2:41 PM  AC09/AC09      Chief Complaint   Patient presents with    Suicidal    Psychiatric Evaluation        History of Present Illness:   Alexander Zavala is a 39 y.o. male presenting to the ED for suicidal ideation, beginning last week.  The complaint has been persistent, moderate to severe in severity, and worsened by depression.  Patient has a long history of psychiatric issues and depression.  He has a problem with methamphetamine as well.  He is seen frequently ER for this combination of problems.  Today states he is feeling more depressed and is not sure he wants to go on living.  He denies any fixed suicidal plan at this time.  He has history of bipolar and schizoaffective disorder      Review of Systems:   Pertinent positives and review of systems as noted above.  Remaining 10 review of systems is negative or noncontributory to today's episode of care.  Review of Systems       --------------------------------------------- PAST HISTORY ---------------------------------------------  Past Medical History:   Past Medical History:   Diagnosis Date    Methamphetamine use disorder, moderate (Multi)     Schizoaffective disorder, bipolar type (Multi) 11/01/2023    R/o schizophrenia          Past Surgical History: History reviewed. No pertinent surgical history.     Social History:   Social History     Social History Narrative    Not on file        Family History: family history is not on file. Unless otherwise noted, family history is non contributory    Patient's Medications   New Prescriptions    No medications on file   Previous Medications    ARIPIPRAZOLE (ABILIFY) 10 MG TABLET        ARIPIPRAZOLE (ABILIFY) 15 MG TABLET    take 1 tablet by mouth twice a day with breakfast and dinner for MOOD / PSYCHOSIS    FLUOXETINE (PROZAC) 20 MG CAPSULE    Take 3 capsules (60 mg) by mouth once daily. Do not start before November 23, 2023.    FLUOXETINE (PROZAC) 40 MG CAPSULE         HALOPERIDOL (HALDOL) 5 MG TABLET    Take 1 tablet (5 mg) by mouth 2 times a day.    HYDROXYZINE PAMOATE (VISTARIL) 50 MG CAPSULE    Take 1 capsule (50 mg) by mouth 3 times a day as needed for anxiety for up to 15 days.    LITHIUM ER (ESKALITH) 450 MG 12 HR TABLET    Take 1 tablet (450 mg) by mouth once daily at bedtime. Do not crush, chew, or split.    LITHIUM ER (LITHOBID) 300 MG 12 HR TABLET    Take 1 tablet (300 mg) by mouth once daily. Do not crush, chew, or split.  Take every morning Do not start before November 23, 2023.    NICOTINE POLACRILEX (NICORETTE) 4 MG GUM    Chew 1 each (4 mg) every 6 hours if needed for smoking cessation (nicotine craving, urge to smoke).    OLANZAPINE (ZYPREXA) 15 MG TABLET    Take 2 tablets (30 mg) by mouth once daily at bedtime.    PRAZOSIN (MINIPRESS) 1 MG CAPSULE    Take 1 capsule (1 mg) by mouth 2 times a day.    TRAZODONE (DESYREL) 100 MG TABLET    take 1 tablet by mouth daily at bedtime if needed for insomnia   Modified Medications    No medications on file   Discontinued Medications    No medications on file      The patient’s home medications have been reviewed.    Allergies: Patient has no known allergies.    -------------------------------------------------- RESULTS -------------------------------------------------  All laboratory and radiology results have been personally reviewed by myself   LABS:  Labs Reviewed   DRUG SCREEN,URINE - Abnormal       Result Value    Amphetamine Screen, Urine Presumptive Positive (*)     Barbiturate Screen, Urine Presumptive Negative      Benzodiazepines Screen, Urine Presumptive Negative      Cannabinoid Screen, Urine Presumptive Positive (*)     Cocaine Metabolite Screen, Urine Presumptive Negative      Fentanyl Screen, Urine Presumptive Negative      Opiate Screen, Urine Presumptive Negative      Oxycodone Screen, Urine Presumptive Negative      PCP Screen, Urine Presumptive Negative      Methadone Screen, Urine Presumptive Negative       Narrative:     Drug screen results are presumptive and should not be used to assess   compliance with prescribed medication. Contact the performing [facility] laboratory   to add-on definitive confirmatory testing if clinically indicated.    Toxicology screening results are reported qualitatively. The concentration must   be greater than or equal to the cutoff to be reported as positive. The concentration   at which the screening test can detect an individual drug or metabolite varies.   The absence of expected drug(s) and/or drug metabolite(s) may indicate non-compliance,   inappropriate timing of specimen collection relative to drug administration, poor drug   absorption, diluted/adulterated urine, or limitations of testing. For medical purposes   only; not valid for forensic use.    Interpretive questions should be directed to the laboratory medical directors.   COMPREHENSIVE METABOLIC PANEL - Abnormal    Glucose 87      Sodium 135 (*)     Potassium 4.0      Chloride 102      Bicarbonate 27      Anion Gap 10      Urea Nitrogen 19      Creatinine 0.75      eGFR >90      Calcium 8.7      Albumin 4.0      Alkaline Phosphatase 65      Total Protein 6.8      AST 26      Bilirubin, Total 0.5      ALT 33     URINALYSIS WITH REFLEX MICROSCOPIC - Abnormal    Color, Urine Yellow      Appearance, Urine Clear      Specific Gravity, Urine 1.024      pH, Urine 6.0      Protein, Urine NEGATIVE      Glucose, Urine NEGATIVE      Blood, Urine NEGATIVE      Ketones, Urine NEGATIVE      Bilirubin, Urine NEGATIVE      Urobilinogen, Urine 2.0 (*)     Nitrite, Urine NEGATIVE      Leukocyte Esterase, Urine TRACE (*)    ALCOHOL - Normal    Alcohol <10     PROTIME-INR - Normal    Protime 12.3      INR 1.1     LACTATE - Normal    Lactate 0.7      Narrative:     Venipuncture immediately after or during the administration of Metamizole may lead to falsely low results. Testing should be performed immediately prior to Metamizole dosing.   CBC  WITH AUTO DIFFERENTIAL    WBC 7.1      nRBC 0.0      RBC 5.36      Hemoglobin 16.2      Hematocrit 47.7      MCV 89      MCH 30.2      MCHC 34.0      RDW 13.2      Platelets 273      Neutrophils % 65.3      Immature Granulocytes %, Automated 0.4      Lymphocytes % 23.0      Monocytes % 6.1      Eosinophils % 4.4      Basophils % 0.8      Neutrophils Absolute 4.62      Immature Granulocytes Absolute, Automated 0.03      Lymphocytes Absolute 1.63      Monocytes Absolute 0.43      Eosinophils Absolute 0.31      Basophils Absolute 0.06     MICROSCOPIC ONLY, URINE    WBC, Urine 1-5      RBC, Urine 1-2      Squamous Epithelial Cells, Urine 1-9 (SPARSE)      Mucus, Urine FEW       EKG shows a sinus rhythm at 83 bpm, noisy baseline, no acute ST elevations.  Normal axis.  Interpreted by NICO Harris MD    RADIOLOGY:  Interpreted by Radiologist.  No orders to display       Encounter Date: 10/25/24   ECG 12 lead   Result Value    Ventricular Rate 54    Atrial Rate 54    DE Interval 144    QRS Duration 98    QT Interval 434    QTC Calculation(Bazett) 411    P Axis 65    R Axis 78    T Axis 42    QRS Count 9    Q Onset 217    P Onset 145    P Offset 200    T Offset 434    QTC Fredericia 418    Narrative    Sinus bradycardia  Otherwise normal ECG  When compared with ECG of 15-OCT-2024 15:32,  Vent. rate has decreased BY  36 BPM  See ED provider note for full interpretation and clinical correlation  Confirmed by Akanksha Palm ([id number]) on 11/9/2024 12:25:30 PM     ------------------------- NURSING NOTES AND VITALS REVIEWED ---------------------------   The nursing notes within the ED encounter and vital signs as below have been reviewed.   BP (!) 147/91   Pulse 95   Temp 36.4 °C (97.5 °F) (Tympanic)   Resp 19   Ht 1.829 m (6')   Wt 92.9 kg (204 lb 12.9 oz)   SpO2 97%   BMI 27.78 kg/m²   Oxygen Saturation Interpretation: Normal      ---------------------------------------------------PHYSICAL  EXAM--------------------------------------  Physical Exam   Constitutional/General: Alert,  well appearing, non toxic in NAD  Head: Normocephalic and atraumatic  Eyes: PERRL, EOMI, conjunctiva normal, sclera non icteric  Mouth: Oropharynx clear, handling secretions, no trismus, no asymmetry of the posterior oropharynx or uvular edema  Neck: Supple, full ROM, non tender to palpation in the midline, no stridor, no crepitus, no meningeal signs  Respiratory: Lungs clear to auscultation bilaterally, no wheezes, rales, or rhonchi. Not in respiratory distress  Cardiovascular:  Regular rate. Regular rhythm. No murmurs, gallops, or rubs. 2+ distal pulses  Chest: No chest wall tenderness  GI:  Abdomen Soft, Non tender, Non distended.  +BS. No organomegaly, no palpable masses,  No rebound, guarding, or rigidity.   Musculoskeletal: Moves all extremities x 4. Warm and well perfused, no clubbing, cyanosis, or edema. Capillary refill <3 seconds  Integument: skin warm and dry. No rashes.   Lymphatic: no lymphadenopathy noted  Neurologic: No focal deficits, symmetric strength 5/5 in the upper and lower extremities bilaterally  Psychiatric: Normal Affect, patient states he is feels life is not worth living.  He denies any fixed suicidal plan.    Procedures    ------------------------------ ED COURSE/MEDICAL DECISION MAKING----------------------  Diagnoses as of 11/12/24 0819   Amphetamine abuse (Multi)      This patient presents to the emergency department with the above history and physical.  Patient has no evidence of acute life-threatening medical illness or injury that prohibit appropriate psychiatric evaluation.  Patient is medically clear for psychiatric evaluation and treatment [facility].  NICO Harris M.D.      Medical Decision Making:   Patient signed out to Dr. Powell pending EPAT evaluation  Diagnoses as of 11/12/24 0819   Amphetamine abuse (Multi)      Counseling:   The emergency  provider has spoken with the patient and discussed today’s results, in addition to providing specific details for the plan of care and counseling regarding the diagnosis and prognosis.  Questions are answered at this time and they are agreeable with the plan.      --------------------------------- IMPRESSION AND DISPOSITION ---------------------------------        IMPRESSION  1. Amphetamine abuse (Multi)        DISPOSITION  Disposition: Discharge to home  Patient condition is fair      Billing Provider Critical Care Time: 0 minutes     Filiberto Harris MD  11/12/24 0825

## 2024-11-12 NOTE — PROGRESS NOTES
EPAT - Social Work Psychiatric Assessment    Arrival Details  Mode of Arrival: Ambulance  Admission Source: Home  Admission Type: Involuntary  EPAT Assessment Start Date: 11/11/24  EPAT Assessment Start Time: 2315  Name of : Ibeth Hill MSW, LSW    History of Present Illness  Admission Reason: Psychiatric Evaulation  HPI: Alexander Zavala is a 39-year-old male presenting to the ED via EMS for a psychiatric evaluation. Reportedly, “pt arrived, told his anthem rep he wants to die. They called squad.” Pt’s chart, triage and provider note all reviewed prior to assessment. Pt was noted to be ‘no risk’ on the [place] Suicide Screening at triage. On arrival pt BAL is negative and UDS is positive for methamphetamine and marijuana.          Pt has a psychiatric history of Schizoaffective disorder, MDD with psychotic features and substance induced mood disorder with psychotic symptoms. Pt is non-compliant with outpatient providers and medications. History of inpatient admission, last was October 2024 at [facility]. Pt is familiar to ED and EPAT due to multiple ED visits with similar presentations.    SW Readmission Information   Readmission within 30 Days: No    Psychiatric Symptoms  Anxiety Symptoms: No problems reported or observed.  Depression Symptoms: No problems reported or observed.  Josy Symptoms: No problems reported or observed.    Psychosis Symptoms  Hallucination Type: No problems reported or observed.  Delusion Type: No problems reported or observed.    Additional Symptoms - Adult  Generalized Anxiety Disorder: No problems reported or observed.  Obsessive Compulsive Disorder: No problems reported or observed.  Panic Attack: No problems reported or observed.  Post Traumatic Stress Disorder: No problems reported or observed.  Delirium: No problems reported or observed.    Past Psychiatric History/Meds/Treatments  Past Psychiatric History: Schizoaffective disorder, MDD with psychotic features and  substance induced mood disorder with psychotic symptoms.  Past Psychiatric Meds/Treatments: pt is non-compliant with outpatient care // last IP hospitalization October 2024 at [facility]  Past Violence/Victimization History: pt has a hx of agitation/violence    Current Mental Health Contacts   Name/Phone Number: pt denies  Provider Name/Phone Number: pt denies    Income Information  Employment Status for: Patient  Employment Status: Disabled  Income Source: Disability    [employer] Service/Education History  Current or Previous  Service: None  Education Level: High school    Social/Cultural History  Social History: pt is a [place] citizen // no guardian // no payee    Legal  Legal Considerations: Patient/ Family Capacity to Make Sound Judgments  Criminal Activity/ Legal Involvement Pertinent to Current Situation/ Hospitalization: none  Legal Concerns: hx of DUI, Assault and Aggravated Disorderly Conduct charges, with 9 months in longterm in 2008.    Drug Screening  Have you used any substances (canabis, cocaine, heroin, hallucinogens, inhalants, etc.) in the past 12 months?: Yes  Have you used any prescription drugs other than prescribed in the past 12 months?: Yes  Is a toxicology screen needed?: Yes    Behavioral Health  Behaviors/Mood: Irritable, Uncooperative    Orientation  Orientation Level: Oriented X4    General Appearance  Motor Activity: Unremarkable  Speech Pattern:  (Unremarkable)  General Attitude: Uninterested, Uncooperative  Appearance/Hygiene: Disheveled    Thought Process  Coherency: [place] thinking  Content: Unremarkable  Delusions:  (None)  Perception: Not altered  Hallucination: None  Judgment/Insight: Limited  Confusion: None  Cognition: Poor safety awareness, Poor attention/concentration, Poor judgement    Sleep Pattern  Sleep Pattern: Unable to assess    Risk Factors  Self Harm/Suicidal Ideation Plan: pt denies  Previous Self Harm/Suicidal Plans: pt denies    Violence Risk  Assessment  Thoughts of Harm to Others: No    Ability to Assess Risk Screen  Risk Screen - Ability to Assess: Able to be screened  [place] Suicide Severity Rating Scale (Screener/Recent Self-Report)  1. Wish to be Dead (Past 1 Month): No  2. Non-Specific Active Suicidal Thoughts (Past 1 Month): No  3. Active Suicidal Ideation with any Methods (Not Plan) Without Intent to Act (Past 1 Month): No  4. Active Suicidal Ideation with Some Intent to Act, Without Specific Plan (Past 1 Month): No  5. Active Suicidal Ideation with Specific Plan and Intent (Past 1 Month): No  6. Suicidal Behavior (Lifetime): No  6. Suicidal Behavior (3 Months): No  Calculated C-SSRS Risk Score (Lifetime/Recent): No Risk Indicated  Step 1: Risk Factors  Current & Past Psychiatric Dx: Alcohol/substance abuse disorders  Precipitants/Stressors: Substance intoxication or withdrawal  Change in Treatment: Non-compliant or not receiving treatment  Access to Lethal Methods : No  Step 3: Suicidal Ideation Intensity  How Many Times Have You Had These Thoughts: Less than once a week  When You Have the Thoughts How Long do They Last : Fleeting - few seconds or minutes  Could/Can You Stop Thinking About Killing Yourself or Wanting to Die if You Want to: Does not attempt to control thoughts  Are There Things - Anyone or Anything - That Stopped You From Wanting to Die or Acting on: Does not apply  What Sort of Reasons Did You Have For Thinking About Wanting to Die or Killing Yourself: Does not apply  Total Score: 2  Step 5: Documentation  Risk Level: Low suicide risk    Psychiatric Impression and Plan of Care  Assessment and Plan: On assessment pt is seen via telehealth. Pt presents A&O x4, demonstrates concrete thought process. Presents with irritable mood and flat affect. Pt is minimally cooperative with assessment. His speech is normal in tone, rate and volume. There are no loosening of associations or delusions present on assessment. Pt does not appear to  be internally stimulated. On assessment pt denying all SI/HI/AVH, access to weapons and NSSIB. Pt states that he has been engaged with [facility] in the past, but he does not want to continue services with them. Pt denies attempting to engage in services at other agencies.      When asked about ED presentation, pt states “I have been in the hospital a lot in the past year.” Pt states that these hospitalizations have not been helpful for him he states that they all have been “a waste of time.” This writer attempted to engage pt in conversation about what he believes would be helpful for him and he states, “I am done with this.” Pt then sets iPad down and refuses to continue assessment.      Diagnostic Impression: Methamphetamine Abuse      Psychiatric Recommendation and Plan for Care: At this time pt is not meeting criteria for an inpatient psychiatric admission. Pt is not presenting as an elevated risk of harm to self or others. Recommendation for discharge and establishing outpatient follow-up. Discussed with Andrew Powell MD who is in agreement.    Outcome/Disposition  Assessment, Recommendations and Risk Level Reviewed with: Andrew Powell MD  EPAT Assessment Completed Date: 11/12/24  EPAT Assessment Completed Time: 0223  Patient Disposition: Home

## 2024-11-12 NOTE — PROGRESS NOTES
Emergency Medicine Transition of Care Note.    I received Alexander Zavala in signout from Dr. Harris.  Please see the previous ED provider note for all HPI, PE and MDM up to the time of signout at 8 PM. This is in addition to the primary record.    In brief Alexander Zavala is an 39 y.o. male presenting for suicidal ideation without a plan  Chief Complaint   Patient presents with    Suicidal    Psychiatric Evaluation     At the time of signout we were awaiting: EPAT evaluation after medical clearance    Labs Reviewed   DRUG SCREEN,URINE - Abnormal       Result Value    Amphetamine Screen, Urine Presumptive Positive (*)     Barbiturate Screen, Urine Presumptive Negative      Benzodiazepines Screen, Urine Presumptive Negative      Cannabinoid Screen, Urine Presumptive Positive (*)     Cocaine Metabolite Screen, Urine Presumptive Negative      Fentanyl Screen, Urine Presumptive Negative      Opiate Screen, Urine Presumptive Negative      Oxycodone Screen, Urine Presumptive Negative      PCP Screen, Urine Presumptive Negative      Methadone Screen, Urine Presumptive Negative      Narrative:     Drug screen results are presumptive and should not be used to assess   compliance with prescribed medication. Contact the performing [facility] laboratory   to add-on definitive confirmatory testing if clinically indicated.    Toxicology screening results are reported qualitatively. The concentration must   be greater than or equal to the cutoff to be reported as positive. The concentration   at which the screening test can detect an individual drug or metabolite varies.   The absence of expected drug(s) and/or drug metabolite(s) may indicate non-compliance,   inappropriate timing of specimen collection relative to drug administration, poor drug   absorption, diluted/adulterated urine, or limitations of testing. For medical purposes   only; not valid for forensic use.    Interpretive questions should be directed to the laboratory medical  directors.   COMPREHENSIVE METABOLIC PANEL - Abnormal    Glucose 87      Sodium 135 (*)     Potassium 4.0      Chloride 102      Bicarbonate 27      Anion Gap 10      Urea Nitrogen 19      Creatinine 0.75      eGFR >90      Calcium 8.7      Albumin 4.0      Alkaline Phosphatase 65      Total Protein 6.8      AST 26      Bilirubin, Total 0.5      ALT 33     URINALYSIS WITH REFLEX MICROSCOPIC - Abnormal    Color, Urine Yellow      Appearance, Urine Clear      Specific Gravity, Urine 1.024      pH, Urine 6.0      Protein, Urine NEGATIVE      Glucose, Urine NEGATIVE      Blood, Urine NEGATIVE      Ketones, Urine NEGATIVE      Bilirubin, Urine NEGATIVE      Urobilinogen, Urine 2.0 (*)     Nitrite, Urine NEGATIVE      Leukocyte Esterase, Urine TRACE (*)    ALCOHOL - Normal    Alcohol <10     PROTIME-INR - Normal    Protime 12.3      INR 1.1     LACTATE - Normal    Lactate 0.7      Narrative:     Venipuncture immediately after or during the administration of Metamizole may lead to falsely low results. Testing should be performed immediately prior to Metamizole dosing.   CBC WITH AUTO DIFFERENTIAL    WBC 7.1      nRBC 0.0      RBC 5.36      Hemoglobin 16.2      Hematocrit 47.7      MCV 89      MCH 30.2      MCHC 34.0      RDW 13.2      Platelets 273      Neutrophils % 65.3      Immature Granulocytes %, Automated 0.4      Lymphocytes % 23.0      Monocytes % 6.1      Eosinophils % 4.4      Basophils % 0.8      Neutrophils Absolute 4.62      Immature Granulocytes Absolute, Automated 0.03      Lymphocytes Absolute 1.63      Monocytes Absolute 0.43      Eosinophils Absolute 0.31      Basophils Absolute 0.06     MICROSCOPIC ONLY, URINE    WBC, Urine 1-5      RBC, Urine 1-2      Squamous Epithelial Cells, Urine 1-9 (SPARSE)      Mucus, Urine FEW             Medical Decision Making  Patient presented with suicidal ideation without a plan.  In the emergency department he was resting comfortably through the stay and without any  complaints.  On my evaluation the patient denied any suicidal ideations, hallucinations, homicidal ideations or any other concerns.  Evaluated by EPAT.  Patient also denied any suicidal ideations to them.  At present time there is no indication for psychiatric admission.  Patient safe to be discharged.  Patient discharged with outpatient resources.    Final diagnoses:   None           Procedure  Procedures    Andrew Powell MD

## 2024-11-16 LAB
ATRIAL RATE: 83 BPM
P AXIS: 69 DEGREES
P OFFSET: 201 MS
P ONSET: 147 MS
PR INTERVAL: 140 MS
Q ONSET: 217 MS
QRS COUNT: 14 BEATS
QRS DURATION: 92 MS
QT INTERVAL: 384 MS
QTC CALCULATION(BAZETT): 451 MS
QTC FREDERICIA: 428 MS
R AXIS: 65 DEGREES
T AXIS: 36 DEGREES
T OFFSET: 409 MS
VENTRICULAR RATE: 83 BPM

## 2024-12-23 ENCOUNTER — HOSPITAL ENCOUNTER (EMERGENCY)
Facility: HOSPITAL | Age: 39
Discharge: HOME | End: 2024-12-24
Attending: EMERGENCY MEDICINE
Payer: MEDICARE

## 2024-12-23 ENCOUNTER — APPOINTMENT (OUTPATIENT)
Dept: CARDIOLOGY | Facility: HOSPITAL | Age: 39
End: 2024-12-23
Payer: MEDICARE

## 2024-12-23 DIAGNOSIS — R45.851 SUICIDAL IDEATION: ICD-10-CM

## 2024-12-23 DIAGNOSIS — F19.929: Primary | ICD-10-CM

## 2024-12-23 DIAGNOSIS — F19.10 DRUG ABUSE (MULTI): ICD-10-CM

## 2024-12-23 LAB
ALBUMIN SERPL BCP-MCNC: 3.9 G/DL (ref 3.4–5)
ALP SERPL-CCNC: 79 U/L (ref 33–120)
ALT SERPL W P-5'-P-CCNC: 22 U/L (ref 10–52)
ANION GAP SERPL CALC-SCNC: 10 MMOL/L (ref 10–20)
AST SERPL W P-5'-P-CCNC: 21 U/L (ref 9–39)
BASOPHILS # BLD AUTO: 0.04 X10*3/UL (ref 0–0.1)
BASOPHILS NFR BLD AUTO: 0.6 %
BILIRUB SERPL-MCNC: 0.5 MG/DL (ref 0–1.2)
BUN SERPL-MCNC: 17 MG/DL (ref 6–23)
CALCIUM SERPL-MCNC: 8.8 MG/DL (ref 8.6–10.3)
CHLORIDE SERPL-SCNC: 103 MMOL/L (ref 98–107)
CO2 SERPL-SCNC: 26 MMOL/L (ref 21–32)
CREAT SERPL-MCNC: 0.88 MG/DL (ref 0.5–1.3)
EGFRCR SERPLBLD CKD-EPI 2021: >90 ML/MIN/1.73M*2
EOSINOPHIL # BLD AUTO: 0.26 X10*3/UL (ref 0–0.7)
EOSINOPHIL NFR BLD AUTO: 3.9 %
ERYTHROCYTE [DISTWIDTH] IN BLOOD BY AUTOMATED COUNT: 12.7 % (ref 11.5–14.5)
ETHANOL SERPL-MCNC: <10 MG/DL
GLUCOSE SERPL-MCNC: 133 MG/DL (ref 74–99)
HCT VFR BLD AUTO: 53.9 % (ref 41–52)
HGB BLD-MCNC: 17.5 G/DL (ref 13.5–17.5)
IMM GRANULOCYTES # BLD AUTO: 0.02 X10*3/UL (ref 0–0.7)
IMM GRANULOCYTES NFR BLD AUTO: 0.3 % (ref 0–0.9)
INR PPP: 1.2 (ref 0.9–1.1)
LACTATE SERPL-SCNC: 1.8 MMOL/L (ref 0.4–2)
LYMPHOCYTES # BLD AUTO: 0.96 X10*3/UL (ref 1.2–4.8)
LYMPHOCYTES NFR BLD AUTO: 14.4 %
MCH RBC QN AUTO: 30.6 PG (ref 26–34)
MCHC RBC AUTO-ENTMCNC: 32.5 G/DL (ref 32–36)
MCV RBC AUTO: 94 FL (ref 80–100)
MONOCYTES # BLD AUTO: 0.34 X10*3/UL (ref 0.1–1)
MONOCYTES NFR BLD AUTO: 5.1 %
NEUTROPHILS # BLD AUTO: 5.06 X10*3/UL (ref 1.2–7.7)
NEUTROPHILS NFR BLD AUTO: 75.7 %
NRBC BLD-RTO: 0 /100 WBCS (ref 0–0)
PLATELET # BLD AUTO: 272 X10*3/UL (ref 150–450)
POTASSIUM SERPL-SCNC: 3.7 MMOL/L (ref 3.5–5.3)
PROT SERPL-MCNC: 7 G/DL (ref 6.4–8.2)
PROTHROMBIN TIME: 13.5 SECONDS (ref 9.8–12.8)
RBC # BLD AUTO: 5.72 X10*6/UL (ref 4.5–5.9)
SODIUM SERPL-SCNC: 135 MMOL/L (ref 136–145)
WBC # BLD AUTO: 6.7 X10*3/UL (ref 4.4–11.3)

## 2024-12-23 PROCEDURE — 36415 COLL VENOUS BLD VENIPUNCTURE: CPT | Performed by: EMERGENCY MEDICINE

## 2024-12-23 PROCEDURE — 93005 ELECTROCARDIOGRAM TRACING: CPT

## 2024-12-23 PROCEDURE — 83605 ASSAY OF LACTIC ACID: CPT | Performed by: EMERGENCY MEDICINE

## 2024-12-23 PROCEDURE — 85025 COMPLETE CBC W/AUTO DIFF WBC: CPT | Performed by: EMERGENCY MEDICINE

## 2024-12-23 PROCEDURE — 80053 COMPREHEN METABOLIC PANEL: CPT | Performed by: EMERGENCY MEDICINE

## 2024-12-23 PROCEDURE — 2500000004 HC RX 250 GENERAL PHARMACY W/ HCPCS (ALT 636 FOR OP/ED): Performed by: EMERGENCY MEDICINE

## 2024-12-23 PROCEDURE — 85610 PROTHROMBIN TIME: CPT | Performed by: EMERGENCY MEDICINE

## 2024-12-23 PROCEDURE — 82077 ASSAY SPEC XCP UR&BREATH IA: CPT | Performed by: EMERGENCY MEDICINE

## 2024-12-23 PROCEDURE — 99284 EMERGENCY DEPT VISIT MOD MDM: CPT | Performed by: EMERGENCY MEDICINE

## 2024-12-23 PROCEDURE — 96372 THER/PROPH/DIAG INJ SC/IM: CPT | Performed by: EMERGENCY MEDICINE

## 2024-12-23 RX ORDER — MIDAZOLAM HYDROCHLORIDE 1 MG/ML
2 INJECTION, SOLUTION INTRAMUSCULAR; INTRAVENOUS ONCE
Status: COMPLETED | OUTPATIENT
Start: 2024-12-23 | End: 2024-12-23

## 2024-12-23 RX ORDER — ZIPRASIDONE MESYLATE 20 MG/ML
20 INJECTION, POWDER, LYOPHILIZED, FOR SOLUTION INTRAMUSCULAR ONCE
Status: COMPLETED | OUTPATIENT
Start: 2024-12-23 | End: 2024-12-23

## 2024-12-23 RX ORDER — DIPHENHYDRAMINE HYDROCHLORIDE 50 MG/ML
50 INJECTION INTRAMUSCULAR; INTRAVENOUS ONCE
Status: COMPLETED | OUTPATIENT
Start: 2024-12-23 | End: 2024-12-23

## 2024-12-23 SDOH — HEALTH STABILITY: MENTAL HEALTH: BEHAVIORS/MOOD: LABILE

## 2024-12-23 SDOH — HEALTH STABILITY: MENTAL HEALTH: BEHAVIORAL HEALTH(WDL): EXCEPTIONS TO WDL

## 2024-12-23 ASSESSMENT — COLUMBIA-SUICIDE SEVERITY RATING SCALE - C-SSRS
1. IN THE PAST MONTH, HAVE YOU WISHED YOU WERE DEAD OR WISHED YOU COULD GO TO SLEEP AND NOT WAKE UP?: NO
2. HAVE YOU ACTUALLY HAD ANY THOUGHTS OF KILLING YOURSELF?: NO
6. HAVE YOU EVER DONE ANYTHING, STARTED TO DO ANYTHING, OR PREPARED TO DO ANYTHING TO END YOUR LIFE?: NO

## 2024-12-23 ASSESSMENT — PAIN - FUNCTIONAL ASSESSMENT: PAIN_FUNCTIONAL_ASSESSMENT: 0-10

## 2024-12-23 ASSESSMENT — PAIN SCALES - GENERAL: PAINLEVEL_OUTOF10: 0 - NO PAIN

## 2024-12-23 NOTE — ED PROVIDER NOTES
"[facility]   ED  Provider Note  12/23/2024  4:26 PM  AC09/AC09      Chief Complaint   Patient presents with   • Psychiatric Evaluation     Patient to the ED for psych eval, patient unable to have a conversation but keeps repeating \"kill me\", \"I want to die\", \"hello hello\". Patient screaming in room with hx of violence.         History of Present Illness:   Alexander Zavala is a 39 y.o. male presenting to the ED for altered mental status, beginning prior to arrival.  The complaint has been persistent today, moderate to severe in severity, and worsened by nothing.  Patient has a history of polysubstance abuse.  He has had multiple ED visits but been positive for methamphetamines and occasionally other drugs as well.  He was dropped off by his brother due to his unruly behavior.  He is pacing about saying I want to die or kill me.  He also screams the top of his lungs and streaks unintelligible phrases.  He has not been violent towards others today but has been thrashing about the bed.  He appears to be actively hallucinating is not in control.      Review of Systems:   Pertinent positives and review of systems as noted above.  Remaining 10 review of systems is negative or noncontributory to today's episode of care.  Review of Systems       --------------------------------------------- PAST HISTORY ---------------------------------------------  Past Medical History:   Past Medical History:   Diagnosis Date   • Methamphetamine use disorder, moderate (Multi)    • Schizoaffective disorder, bipolar type (Multi) 11/01/2023    R/o schizophrenia          Past Surgical History: History reviewed. No pertinent surgical history.     Social History:   Social History     Social History Narrative   • Not on file        Family History: family history is not on file. Unless otherwise noted, family history is non contributory    Patient's Medications   New Prescriptions    No medications on file   Previous Medications    ARIPIPRAZOLE " (ABILIFY) 10 MG TABLET        ARIPIPRAZOLE (ABILIFY) 15 MG TABLET    take 1 tablet by mouth twice a day with breakfast and dinner for MOOD / PSYCHOSIS    FLUOXETINE (PROZAC) 20 MG CAPSULE    Take 3 capsules (60 mg) by mouth once daily. Do not start before November 23, 2023.    FLUOXETINE (PROZAC) 40 MG CAPSULE        HALOPERIDOL (HALDOL) 5 MG TABLET    Take 1 tablet (5 mg) by mouth 2 times a day.    HYDROXYZINE PAMOATE (VISTARIL) 50 MG CAPSULE    Take 1 capsule (50 mg) by mouth 3 times a day as needed for anxiety for up to 15 days.    LITHIUM ER (ESKALITH) 450 MG 12 HR TABLET    Take 1 tablet (450 mg) by mouth once daily at bedtime. Do not crush, chew, or split.    LITHIUM ER (LITHOBID) 300 MG 12 HR TABLET    Take 1 tablet (300 mg) by mouth once daily. Do not crush, chew, or split.  Take every morning Do not start before November 23, 2023.    NICOTINE POLACRILEX (NICORETTE) 4 MG GUM    Chew 1 each (4 mg) every 6 hours if needed for smoking cessation (nicotine craving, urge to smoke).    OLANZAPINE (ZYPREXA) 15 MG TABLET    Take 2 tablets (30 mg) by mouth once daily at bedtime.    PRAZOSIN (MINIPRESS) 1 MG CAPSULE    Take 1 capsule (1 mg) by mouth 2 times a day.    TRAZODONE (DESYREL) 100 MG TABLET    take 1 tablet by mouth daily at bedtime if needed for insomnia   Modified Medications    No medications on file   Discontinued Medications    No medications on file      The patient’s home medications have been reviewed.    Allergies: Patient has no known allergies.    -------------------------------------------------- RESULTS -------------------------------------------------  All laboratory and radiology results have been personally reviewed by myself   LABS:  Labs Reviewed   PROTIME-INR - Abnormal       Result Value    Protime 13.5 (*)     INR 1.2 (*)    COMPREHENSIVE METABOLIC PANEL - Abnormal    Glucose 133 (*)     Sodium 135 (*)     Potassium 3.7      Chloride 103      Bicarbonate 26      Anion Gap 10      Urea  Nitrogen 17      Creatinine 0.88      eGFR >90      Calcium 8.8      Albumin 3.9      Alkaline Phosphatase 79      Total Protein 7.0      AST 21      Bilirubin, Total 0.5      ALT 22     CBC WITH AUTO DIFFERENTIAL - Abnormal    WBC 6.7      nRBC 0.0      RBC 5.72      Hemoglobin 17.5      Hematocrit 53.9 (*)     MCV 94      MCH 30.6      MCHC 32.5      RDW 12.7      Platelets 272      Neutrophils % 75.7      Immature Granulocytes %, Automated 0.3      Lymphocytes % 14.4      Monocytes % 5.1      Eosinophils % 3.9      Basophils % 0.6      Neutrophils Absolute 5.06      Immature Granulocytes Absolute, Automated 0.02      Lymphocytes Absolute 0.96 (*)     Monocytes Absolute 0.34      Eosinophils Absolute 0.26      Basophils Absolute 0.04     ALCOHOL - Normal    Alcohol <10     LACTATE - Normal    Lactate 1.8      Narrative:     Venipuncture immediately after or during the administration of Metamizole may lead to falsely low results. Testing should be performed immediately prior to Metamizole dosing.   DRUG SCREEN,URINE   URINALYSIS WITH REFLEX MICROSCOPIC     EKG: Sinus rhythm at 86 bpm, marked sinus arrhythmia, normal axis, normal intervals, no acute ST elevations.  Interpreted by NICO Harris MD      RADIOLOGY:  Interpreted by Radiologist.  No orders to display       Encounter Date: 11/11/24   ECG 12 Lead   Result Value    Ventricular Rate 83    Atrial Rate 83    AK Interval 140    QRS Duration 92    QT Interval 384    QTC Calculation(Bazett) 451    P Axis 69    R Axis 65    T Axis 36    QRS Count 14    Q Onset 217    P Onset 147    P Offset 201    T Offset 409    QTC Fredericia 428    Narrative    Sinus rhythm with occasional Premature ventricular complexes  Otherwise normal ECG  When compared with ECG of 25-OCT-2024 19:45,  Premature ventricular complexes are now Present  Vent. rate has increased BY  29 BPM  See ED provider note for full interpretation and clinical correlation  Confirmed by Lorena Amezcua ([id number]) on  "11/16/2024 10:03:57 AM     ------------------------- NURSING NOTES AND VITALS REVIEWED ---------------------------   The nursing notes within the ED encounter and vital signs as below have been reviewed.   /87   Pulse (!) 120   Temp 35.7 °C (96.3 °F)   Resp 18   Ht 1.778 m (5' 10\")   Wt 95.3 kg (210 lb)   SpO2 98%   BMI 30.13 kg/m²   Oxygen Saturation Interpretation: Normal      ---------------------------------------------------PHYSICAL EXAM--------------------------------------  Physical Exam   Constitutional/General: Alert,  well appearing, non toxic in NAD  Head: Normocephalic and atraumatic  Eyes: PERRL, EOMI, conjunctiva normal, sclera non icteric  Mouth: Oropharynx clear, handling secretions, no trismus, no asymmetry of the posterior oropharynx or uvular edema  Neck: Supple, full ROM, non tender to palpation in the midline, no stridor, no crepitus, no meningeal signs  Respiratory: Lungs clear to auscultation bilaterally, no wheezes, rales, or rhonchi. Not in respiratory distress  Cardiovascular:  Regular rate. Regular rhythm. No murmurs, gallops, or rubs. 2+ distal pulses  Chest: No chest wall tenderness  GI:  Abdomen Soft, Non tender, Non distended.  +BS. No organomegaly, no palpable masses,  No rebound, guarding, or rigidity.   Musculoskeletal: Moves all extremities x 4. Warm and well perfused, no clubbing, cyanosis, or edema. Capillary refill <3 seconds  Integument: skin warm and dry. No rashes.   Lymphatic: no lymphadenopathy noted  Neurologic: No focal deficits, symmetric strength 5/5 in the upper and lower extremities bilaterally  Psychiatric: Patient is yelling out he wants to die or just kill me.  He is thrashing about the bed and shrieking unintelligibly at times.  He does not appear to be in control of himself at this time.     Procedures    ------------------------------ ED COURSE/MEDICAL DECISION MAKING----------------------  Diagnoses as of 12/24/24 1058   Suicidal ideation   Drug " abuse (Multi)   Substance intoxication with complication (Multi)      Patient was signed out to Dr. Powell pending laboratory test results.  The patient will require medical clearance for EPAT evaluation.      Medical Decision Making:     Diagnoses as of 12/24/24 1058   Suicidal ideation   Drug abuse (Multi)   Substance intoxication with complication (Multi)      Counseling:   The emergency provider has spoken with the patient and discussed today’s results, in addition to providing specific details for the plan of care and counseling regarding the diagnosis and prognosis.  Questions are answered at this time and they are agreeable with the plan.      --------------------------------- IMPRESSION AND DISPOSITION ---------------------------------        IMPRESSION  1. Suicidal ideation    2. Drug abuse (Multi)        DISPOSITION  Disposition:  See ED attending note  Patient condition is fair      Billing Provider Critical Care Time: 0 minutes     Filiberto Harris MD  12/24/24 1052

## 2024-12-24 VITALS
DIASTOLIC BLOOD PRESSURE: 73 MMHG | HEART RATE: 88 BPM | OXYGEN SATURATION: 97 % | WEIGHT: 210 LBS | BODY MASS INDEX: 30.06 KG/M2 | SYSTOLIC BLOOD PRESSURE: 135 MMHG | TEMPERATURE: 97.2 F | HEIGHT: 70 IN | RESPIRATION RATE: 18 BRPM

## 2024-12-24 SDOH — HEALTH STABILITY: MENTAL HEALTH: BEHAVIORS/MOOD: LABILE;COOPERATIVE

## 2024-12-24 SDOH — HEALTH STABILITY: MENTAL HEALTH: BEHAVIORAL HEALTH(WDL): EXCEPTIONS TO WDL

## 2024-12-24 ASSESSMENT — PAIN SCALES - GENERAL: PAINLEVEL_OUTOF10: 0 - NO PAIN

## 2024-12-24 ASSESSMENT — COLUMBIA-SUICIDE SEVERITY RATING SCALE - C-SSRS
6. HAVE YOU EVER DONE ANYTHING, STARTED TO DO ANYTHING, OR PREPARED TO DO ANYTHING TO END YOUR LIFE?: NO
1. SINCE LAST CONTACT, HAVE YOU WISHED YOU WERE DEAD OR WISHED YOU COULD GO TO SLEEP AND NOT WAKE UP?: NO
2. HAVE YOU ACTUALLY HAD ANY THOUGHTS OF KILLING YOURSELF?: NO

## 2024-12-24 ASSESSMENT — PAIN - FUNCTIONAL ASSESSMENT: PAIN_FUNCTIONAL_ASSESSMENT: 0-10

## 2024-12-24 NOTE — PROGRESS NOTES
"Emergency Medicine Transition of Care Note.    I received Alexander Zavala in signout from Dr. Harris.  Please see the previous ED provider note for all HPI, PE and MDM up to the time of signout at change of shift. This is in addition to the primary record.    In brief Alexander Zavala is an 39 y.o. male presenting for altered mental status.  History of polysubstance abuse.  Patient saying he wanted to die.  Chief Complaint   Patient presents with    Psychiatric Evaluation     Patient to the ED for psych eval, patient unable to have a conversation but keeps repeating \"kill me\", \"I want to die\", \"hello hello\". Patient screaming in room with hx of violence.      At the time of signout we were awaiting: Urine for medical clearance    Diagnoses as of 12/24/24 0642   Suicidal ideation   Drug abuse (Multi)   Substance intoxication with complication (Multi)     Labs Reviewed   PROTIME-INR - Abnormal       Result Value    Protime 13.5 (*)     INR 1.2 (*)    COMPREHENSIVE METABOLIC PANEL - Abnormal    Glucose 133 (*)     Sodium 135 (*)     Potassium 3.7      Chloride 103      Bicarbonate 26      Anion Gap 10      Urea Nitrogen 17      Creatinine 0.88      eGFR >90      Calcium 8.8      Albumin 3.9      Alkaline Phosphatase 79      Total Protein 7.0      AST 21      Bilirubin, Total 0.5      ALT 22     CBC WITH AUTO DIFFERENTIAL - Abnormal    WBC 6.7      nRBC 0.0      RBC 5.72      Hemoglobin 17.5      Hematocrit 53.9 (*)     MCV 94      MCH 30.6      MCHC 32.5      RDW 12.7      Platelets 272      Neutrophils % 75.7      Immature Granulocytes %, Automated 0.3      Lymphocytes % 14.4      Monocytes % 5.1      Eosinophils % 3.9      Basophils % 0.6      Neutrophils Absolute 5.06      Immature Granulocytes Absolute, Automated 0.02      Lymphocytes Absolute 0.96 (*)     Monocytes Absolute 0.34      Eosinophils Absolute 0.26      Basophils Absolute 0.04     ALCOHOL - Normal    Alcohol <10     LACTATE - Normal    Lactate 1.8      " Narrative:     Venipuncture immediately after or during the administration of Metamizole may lead to falsely low results. Testing should be performed immediately prior to Metamizole dosing.   DRUG SCREEN,URINE   URINALYSIS WITH REFLEX MICROSCOPIC       Medical Decision Making  Patient has been allowed to rest through the night.  He presented to the emergency department intoxicated and unruly.  Patient verbalizing desires to die.  In the morning at 6:30 in the morning the patient is awake and alert.  He states that he does not want to provide urine.  He denies any suicidal or homicidal thoughts and denies any hallucinations and states that he just wants to go home.  At present time the patient is clinically sober.  He is calm and cooperative.  He is capable of making his own decisions.  He is not a danger to himself or other.  The patient will be discharged to follow-up with primary care.    Final diagnoses:   [R45.851] Suicidal ideation   [F19.10] Drug abuse (Multi)   [F19.929] Substance intoxication with complication (Multi)           Procedure  Procedures    Andrew Powell MD

## 2024-12-26 ENCOUNTER — HOSPITAL ENCOUNTER (EMERGENCY)
Facility: HOSPITAL | Age: 39
Discharge: HOME | End: 2024-12-26
Attending: EMERGENCY MEDICINE
Payer: MEDICARE

## 2024-12-26 VITALS
WEIGHT: 230 LBS | SYSTOLIC BLOOD PRESSURE: 132 MMHG | BODY MASS INDEX: 31.15 KG/M2 | OXYGEN SATURATION: 98 % | HEART RATE: 89 BPM | RESPIRATION RATE: 20 BRPM | HEIGHT: 72 IN | DIASTOLIC BLOOD PRESSURE: 84 MMHG | TEMPERATURE: 97.6 F

## 2024-12-26 DIAGNOSIS — F41.9 ANXIETY: Primary | ICD-10-CM

## 2024-12-26 LAB
ALBUMIN SERPL BCP-MCNC: 4.1 G/DL (ref 3.4–5)
ALP SERPL-CCNC: 76 U/L (ref 33–120)
ALT SERPL W P-5'-P-CCNC: 19 U/L (ref 10–52)
AMPHETAMINES UR QL SCN: ABNORMAL
ANION GAP SERPL CALC-SCNC: 12 MMOL/L (ref 10–20)
APPEARANCE UR: CLEAR
AST SERPL W P-5'-P-CCNC: 19 U/L (ref 9–39)
BARBITURATES UR QL SCN: ABNORMAL
BASOPHILS # BLD AUTO: 0.05 X10*3/UL (ref 0–0.1)
BASOPHILS NFR BLD AUTO: 0.8 %
BENZODIAZ UR QL SCN: ABNORMAL
BILIRUB SERPL-MCNC: 0.6 MG/DL (ref 0–1.2)
BILIRUB UR STRIP.AUTO-MCNC: NEGATIVE MG/DL
BUN SERPL-MCNC: 18 MG/DL (ref 6–23)
BZE UR QL SCN: ABNORMAL
CALCIUM SERPL-MCNC: 9.2 MG/DL (ref 8.6–10.3)
CANNABINOIDS UR QL SCN: ABNORMAL
CHLORIDE SERPL-SCNC: 103 MMOL/L (ref 98–107)
CO2 SERPL-SCNC: 24 MMOL/L (ref 21–32)
COLOR UR: YELLOW
CREAT SERPL-MCNC: 0.7 MG/DL (ref 0.5–1.3)
EGFRCR SERPLBLD CKD-EPI 2021: >90 ML/MIN/1.73M*2
EOSINOPHIL # BLD AUTO: 0.41 X10*3/UL (ref 0–0.7)
EOSINOPHIL NFR BLD AUTO: 6.5 %
ERYTHROCYTE [DISTWIDTH] IN BLOOD BY AUTOMATED COUNT: 12.6 % (ref 11.5–14.5)
ETHANOL SERPL-MCNC: <10 MG/DL
FENTANYL+NORFENTANYL UR QL SCN: ABNORMAL
GLUCOSE SERPL-MCNC: 90 MG/DL (ref 74–99)
GLUCOSE UR STRIP.AUTO-MCNC: NEGATIVE MG/DL
HCT VFR BLD AUTO: 48.9 % (ref 41–52)
HGB BLD-MCNC: 16.7 G/DL (ref 13.5–17.5)
IMM GRANULOCYTES # BLD AUTO: 0.03 X10*3/UL (ref 0–0.7)
IMM GRANULOCYTES NFR BLD AUTO: 0.5 % (ref 0–0.9)
KETONES UR STRIP.AUTO-MCNC: ABNORMAL MG/DL
LEUKOCYTE ESTERASE UR QL STRIP.AUTO: NEGATIVE
LYMPHOCYTES # BLD AUTO: 1.34 X10*3/UL (ref 1.2–4.8)
LYMPHOCYTES NFR BLD AUTO: 21.2 %
MCH RBC QN AUTO: 30.6 PG (ref 26–34)
MCHC RBC AUTO-ENTMCNC: 34.2 G/DL (ref 32–36)
MCV RBC AUTO: 90 FL (ref 80–100)
METHADONE UR QL SCN: ABNORMAL
MONOCYTES # BLD AUTO: 0.4 X10*3/UL (ref 0.1–1)
MONOCYTES NFR BLD AUTO: 6.3 %
NEUTROPHILS # BLD AUTO: 4.08 X10*3/UL (ref 1.2–7.7)
NEUTROPHILS NFR BLD AUTO: 64.7 %
NITRITE UR QL STRIP.AUTO: NEGATIVE
NRBC BLD-RTO: 0 /100 WBCS (ref 0–0)
OPIATES UR QL SCN: ABNORMAL
OXYCODONE+OXYMORPHONE UR QL SCN: ABNORMAL
PCP UR QL SCN: ABNORMAL
PH UR STRIP.AUTO: 5 [PH]
PLATELET # BLD AUTO: 316 X10*3/UL (ref 150–450)
POTASSIUM SERPL-SCNC: 4 MMOL/L (ref 3.5–5.3)
PROT SERPL-MCNC: 7.2 G/DL (ref 6.4–8.2)
PROT UR STRIP.AUTO-MCNC: NEGATIVE MG/DL
RBC # BLD AUTO: 5.46 X10*6/UL (ref 4.5–5.9)
RBC # UR STRIP.AUTO: NEGATIVE /UL
SODIUM SERPL-SCNC: 135 MMOL/L (ref 136–145)
SP GR UR STRIP.AUTO: 1.02
UROBILINOGEN UR STRIP.AUTO-MCNC: 4 MG/DL
WBC # BLD AUTO: 6.3 X10*3/UL (ref 4.4–11.3)

## 2024-12-26 PROCEDURE — 99285 EMERGENCY DEPT VISIT HI MDM: CPT | Performed by: EMERGENCY MEDICINE

## 2024-12-26 PROCEDURE — 36415 COLL VENOUS BLD VENIPUNCTURE: CPT | Performed by: EMERGENCY MEDICINE

## 2024-12-26 PROCEDURE — 85025 COMPLETE CBC W/AUTO DIFF WBC: CPT | Performed by: EMERGENCY MEDICINE

## 2024-12-26 PROCEDURE — 80053 COMPREHEN METABOLIC PANEL: CPT | Performed by: EMERGENCY MEDICINE

## 2024-12-26 PROCEDURE — 81003 URINALYSIS AUTO W/O SCOPE: CPT | Mod: 59 | Performed by: EMERGENCY MEDICINE

## 2024-12-26 PROCEDURE — 82077 ASSAY SPEC XCP UR&BREATH IA: CPT | Performed by: EMERGENCY MEDICINE

## 2024-12-26 PROCEDURE — 80307 DRUG TEST PRSMV CHEM ANLYZR: CPT | Performed by: EMERGENCY MEDICINE

## 2024-12-26 ASSESSMENT — COLUMBIA-SUICIDE SEVERITY RATING SCALE - C-SSRS
1. IN THE PAST MONTH, HAVE YOU WISHED YOU WERE DEAD OR WISHED YOU COULD GO TO SLEEP AND NOT WAKE UP?: NO
1. SINCE LAST CONTACT, HAVE YOU WISHED YOU WERE DEAD OR WISHED YOU COULD GO TO SLEEP AND NOT WAKE UP?: NO
2. HAVE YOU ACTUALLY HAD ANY THOUGHTS OF KILLING YOURSELF?: NO
2. HAVE YOU ACTUALLY HAD ANY THOUGHTS OF KILLING YOURSELF?: NO
6. HAVE YOU EVER DONE ANYTHING, STARTED TO DO ANYTHING, OR PREPARED TO DO ANYTHING TO END YOUR LIFE?: NO
6. HAVE YOU EVER DONE ANYTHING, STARTED TO DO ANYTHING, OR PREPARED TO DO ANYTHING TO END YOUR LIFE?: NO

## 2024-12-26 ASSESSMENT — PAIN SCALES - GENERAL: PAINLEVEL_OUTOF10: 0 - NO PAIN

## 2024-12-26 ASSESSMENT — PAIN - FUNCTIONAL ASSESSMENT: PAIN_FUNCTIONAL_ASSESSMENT: 0-10

## 2024-12-26 NOTE — ED TRIAGE NOTES
"Arrived to  disheveled. States he hasn't hd in meds in weeks and his \"words are messed up\". Denies any thoughts of self harm or wanting to harm others. Just \"not right\". States he hasn't been showering or eating right. Cooperative with care and following directions without difficulty.   "

## 2024-12-26 NOTE — ED NOTES
Patient left at this time. Patient was evaluated by physician, Dr. Gaytan. Patient denies suicidal or homicidal ideation. States he needs to  his medications from [facility] and will be leaving here to go get them. Stressed importance of going to  medications. Asked patient is he needed RN to call a family member or someone to assist him. Patient declines. Physician states patient is not pink-slipped and may leave. Discharged from this facility. VSS. All belongings returned.     Yas Wei RN  12/26/24 4568

## 2024-12-26 NOTE — ED PROVIDER NOTES
HPI   Chief Complaint   Patient presents with    Psychiatric Evaluation     Off meds for weeks       Patient presents with a chief complaint of some psychiatric issues.  He always has issues with his landlord.  He has not picked up his meds in a few days and has run out.    ROS:    CONSTITUTIONAL: Feeling anxious    HEENT: Denies changes in vision and hearing, No sore throat    RESPIRATORY: Denies SOB and cough    CV: Denies palpitations or chest pain    GI: Denies abdominal pain, nausea, vomiting and diarrhea    : Denies dysuria and urinary frequency    MUSCULOSKELETAL: Denies myalgia and joint pain    SKIN: Denies rash and pruritus    NEUROLOGICAL: Denies headache and syncope    PSYCHIATRIC: Denies recent changes in mood. Denies anxiety and depression      PHYSICAL EXAM:    GENERAL: Alert and oriented x 3. No acute distress. Well-nourished    EYES: EOMI. Anicteric    HEENT: Moist mucous membranes. No scleral icterus. No cervical lymphadenopathy    LUNGS: Clear to auscultation bilaterally. No accessory muscle use    CARDIOVASCULAR: Regular rate and rhythm. No murmur. No JVD    ABDOMEN: Soft, non-tender and non-distended. No palpable masses    EXTREMITIES: No edema. Non-tender    SKIN: No rashes or lesions    NEUROLOGIC: No focal neurological deficits. CN II-XII grossly intact    PSYCHIATRIC: Cooperative.  Appearing anxious      Medical Decision Making:    Differential Diagnosis: Psychiatric issues    Clinical Laboratory Review:    Imaging Review:    Discussion with Consulting Physician:    Treatment Plan: Patient now wants to leave the hospital at this time and go home.  Discussed with him picking up his medications at the pharmacy on his way home at this time.  Educated to return to the hospital at any time if needed    Follow Up:  Follow up with your primary care physician as soon as possible    Return Precautions:  Return to the emergency department if symptoms worsen at any time              Patient History    Past Medical History:   Diagnosis Date    Methamphetamine use disorder, moderate (Multi)     Schizoaffective disorder, bipolar type (Multi) 11/01/2023    R/o schizophrenia       History reviewed. No pertinent surgical history.  No family history on file.  Social History     Tobacco Use    Smoking status: Every Day     Types: Cigarettes    Smokeless tobacco: Never   Vaping Use    Vaping status: Some Days   Substance Use Topics    Alcohol use: Not on file    Drug use: Not Currently     Types: Methamphetamines, Marijuana       Physical Exam   ED Triage Vitals [12/26/24 1342]   Temperature Heart Rate Respirations BP   36.4 °C (97.6 °F) 89 20 132/84      SpO2 Temp Source Heart Rate Source Patient Position   98 % Tympanic -- Sitting      BP Location FiO2 (%)     Left arm --       Physical Exam      ED Course & MDM   Diagnoses as of 12/26/24 1454   Anxiety                 No data recorded     Shannan Coma Scale Score: 15 (12/26/24 1354 : Tasha Sánchez RN)                           Medical Decision Making      Procedure  Procedures    Diagnoses as of 12/26/24 1454   Anxiety     Diagnoses as of 12/26/24 1454   Anxiety            Callie Gaytan DO  12/26/24 1454

## 2024-12-27 LAB
ATRIAL RATE: 86 BPM
HOLD SPECIMEN: NORMAL
P AXIS: 72 DEGREES
P OFFSET: 204 MS
P ONSET: 149 MS
PR INTERVAL: 136 MS
Q ONSET: 217 MS
QRS COUNT: 15 BEATS
QRS DURATION: 92 MS
QT INTERVAL: 366 MS
QTC CALCULATION(BAZETT): 437 MS
QTC FREDERICIA: 412 MS
R AXIS: 69 DEGREES
T AXIS: 40 DEGREES
T OFFSET: 400 MS
VENTRICULAR RATE: 86 BPM

## 2025-01-08 ENCOUNTER — HOSPITAL ENCOUNTER (EMERGENCY)
Facility: HOSPITAL | Age: 40
Discharge: HOME | End: 2025-01-09
Attending: EMERGENCY MEDICINE
Payer: MEDICARE

## 2025-01-08 DIAGNOSIS — R41.82 ALTERED MENTAL STATUS, UNSPECIFIED ALTERED MENTAL STATUS TYPE: Primary | ICD-10-CM

## 2025-01-08 DIAGNOSIS — F19.10 POLYSUBSTANCE ABUSE (MULTI): ICD-10-CM

## 2025-01-08 LAB
ALBUMIN SERPL BCP-MCNC: 4.2 G/DL (ref 3.4–5)
ALP SERPL-CCNC: 88 U/L (ref 33–120)
ALT SERPL W P-5'-P-CCNC: 19 U/L (ref 10–52)
ANION GAP SERPL CALC-SCNC: 13 MMOL/L (ref 10–20)
APAP SERPL-MCNC: <10 UG/ML
AST SERPL W P-5'-P-CCNC: 21 U/L (ref 9–39)
BASOPHILS # BLD AUTO: 0.05 X10*3/UL (ref 0–0.1)
BASOPHILS NFR BLD AUTO: 0.6 %
BILIRUB SERPL-MCNC: 0.5 MG/DL (ref 0–1.2)
BUN SERPL-MCNC: 19 MG/DL (ref 6–23)
CALCIUM SERPL-MCNC: 9.5 MG/DL (ref 8.6–10.3)
CHLORIDE SERPL-SCNC: 99 MMOL/L (ref 98–107)
CO2 SERPL-SCNC: 27 MMOL/L (ref 21–32)
CREAT SERPL-MCNC: 0.86 MG/DL (ref 0.5–1.3)
EGFRCR SERPLBLD CKD-EPI 2021: >90 ML/MIN/1.73M*2
EOSINOPHIL # BLD AUTO: 0.45 X10*3/UL (ref 0–0.7)
EOSINOPHIL NFR BLD AUTO: 5 %
ERYTHROCYTE [DISTWIDTH] IN BLOOD BY AUTOMATED COUNT: 13.1 % (ref 11.5–14.5)
ETHANOL SERPL-MCNC: <10 MG/DL
GLUCOSE SERPL-MCNC: 120 MG/DL (ref 74–99)
HCT VFR BLD AUTO: 47.2 % (ref 41–52)
HGB BLD-MCNC: 15.7 G/DL (ref 13.5–17.5)
IMM GRANULOCYTES # BLD AUTO: 0.02 X10*3/UL (ref 0–0.7)
IMM GRANULOCYTES NFR BLD AUTO: 0.2 % (ref 0–0.9)
LYMPHOCYTES # BLD AUTO: 1.26 X10*3/UL (ref 1.2–4.8)
LYMPHOCYTES NFR BLD AUTO: 13.9 %
MCH RBC QN AUTO: 30.5 PG (ref 26–34)
MCHC RBC AUTO-ENTMCNC: 33.3 G/DL (ref 32–36)
MCV RBC AUTO: 92 FL (ref 80–100)
MONOCYTES # BLD AUTO: 0.65 X10*3/UL (ref 0.1–1)
MONOCYTES NFR BLD AUTO: 7.2 %
NEUTROPHILS # BLD AUTO: 6.66 X10*3/UL (ref 1.2–7.7)
NEUTROPHILS NFR BLD AUTO: 73.1 %
NRBC BLD-RTO: 0 /100 WBCS (ref 0–0)
PLATELET # BLD AUTO: 289 X10*3/UL (ref 150–450)
POTASSIUM SERPL-SCNC: 3.9 MMOL/L (ref 3.5–5.3)
PROT SERPL-MCNC: 7.2 G/DL (ref 6.4–8.2)
RBC # BLD AUTO: 5.15 X10*6/UL (ref 4.5–5.9)
SALICYLATES SERPL-MCNC: <3 MG/DL
SODIUM SERPL-SCNC: 135 MMOL/L (ref 136–145)
WBC # BLD AUTO: 9.1 X10*3/UL (ref 4.4–11.3)

## 2025-01-08 PROCEDURE — 84132 ASSAY OF SERUM POTASSIUM: CPT | Performed by: EMERGENCY MEDICINE

## 2025-01-08 PROCEDURE — 80143 DRUG ASSAY ACETAMINOPHEN: CPT | Performed by: EMERGENCY MEDICINE

## 2025-01-08 PROCEDURE — 2500000001 HC RX 250 WO HCPCS SELF ADMINISTERED DRUGS (ALT 637 FOR MEDICARE OP)

## 2025-01-08 PROCEDURE — 2500000001 HC RX 250 WO HCPCS SELF ADMINISTERED DRUGS (ALT 637 FOR MEDICARE OP): Performed by: EMERGENCY MEDICINE

## 2025-01-08 PROCEDURE — 84295 ASSAY OF SERUM SODIUM: CPT | Performed by: EMERGENCY MEDICINE

## 2025-01-08 PROCEDURE — 99283 EMERGENCY DEPT VISIT LOW MDM: CPT | Performed by: EMERGENCY MEDICINE

## 2025-01-08 PROCEDURE — 85025 COMPLETE CBC W/AUTO DIFF WBC: CPT | Performed by: EMERGENCY MEDICINE

## 2025-01-08 PROCEDURE — 80179 DRUG ASSAY SALICYLATE: CPT | Performed by: EMERGENCY MEDICINE

## 2025-01-08 PROCEDURE — 36415 COLL VENOUS BLD VENIPUNCTURE: CPT | Performed by: EMERGENCY MEDICINE

## 2025-01-08 RX ORDER — LORAZEPAM 1 MG/1
TABLET ORAL
Status: COMPLETED
Start: 2025-01-08 | End: 2025-01-08

## 2025-01-08 RX ORDER — LORAZEPAM 2 MG/ML
2 INJECTION INTRAMUSCULAR ONCE AS NEEDED
Status: COMPLETED | OUTPATIENT
Start: 2025-01-08 | End: 2025-01-08

## 2025-01-08 RX ORDER — HALOPERIDOL 5 MG/ML
5 INJECTION INTRAMUSCULAR ONCE AS NEEDED
Status: COMPLETED | OUTPATIENT
Start: 2025-01-08 | End: 2025-01-08

## 2025-01-08 RX ORDER — HALOPERIDOL 5 MG/1
5 TABLET ORAL ONCE AS NEEDED
Status: COMPLETED | OUTPATIENT
Start: 2025-01-08 | End: 2025-01-08

## 2025-01-08 RX ORDER — LORAZEPAM 1 MG/1
2 TABLET ORAL ONCE AS NEEDED
Status: COMPLETED | OUTPATIENT
Start: 2025-01-08 | End: 2025-01-08

## 2025-01-08 RX ADMIN — LORAZEPAM 2 MG: 1 TABLET ORAL at 11:50

## 2025-01-08 RX ADMIN — HALOPERIDOL 5 MG: 5 TABLET ORAL at 12:07

## 2025-01-08 SDOH — HEALTH STABILITY: MENTAL HEALTH: BEHAVIORS/MOOD: ANXIOUS;FLIGHT OF IDEAS;PACING

## 2025-01-08 SDOH — HEALTH STABILITY: MENTAL HEALTH: BEHAVIORAL HEALTH(WDL): EXCEPTIONS TO WDL

## 2025-01-08 ASSESSMENT — PAIN - FUNCTIONAL ASSESSMENT: PAIN_FUNCTIONAL_ASSESSMENT: 0-10

## 2025-01-08 ASSESSMENT — PAIN SCALES - GENERAL: PAINLEVEL_OUTOF10: 0 - NO PAIN

## 2025-01-08 NOTE — ED TRIAGE NOTES
"Pt checks into ER after being in the ED lobby with his friends after he was seen in the ER since 2am this morning, pt left facility and then came back and checked in stating \"I want to sleep its cold\" multiple times. In between statements, states not very comprehensible words and phrases. Patient does state once that he does not want to kill himself following can not get patient to answer questions cooperatively , continues to state he just wants to sleep. Pt rocking back in forth in chair, not very cooperative at this time. Placed in psych safe room, clothing and belongings secured changed into gown and pants. Clean new dry socks provided.  Advised we will feed and warm patient allow him to sleep here in the psych safe room and with medically clear and once patient is awake we will reassess patient's need for psychiatric evaluation.   "

## 2025-01-08 NOTE — ED PROVIDER NOTES
Department of Emergency Medicine   ED  Provider Note  Admit Date/RoomTime: 1/8/2025 11:39 AM  ED Room: AC01/AC01                  History of Present Illness:   Alexander Zavala is a 39 y.o. male presenting to the ED for probable psychosis and cold and hungry, beginning last night.  The complaint has been persistent, moderate in severity, and worsened by nothing.  Patient has history of polysubstance abuse.  Patient has been sitting in the ED lobby since 2 AM last night.  His friends apparently dropped him off.  Patient signed in to be seen stating that he needs to sleep and that he is cold.  He keeps repeating that he needs to sleep and that he wants to go home.  He is able to tell me that his friends dropped him off last night.  He cannot tell me why they dropped him off here but has been here multiple times in the past for polysubstance abuse and altered mental status secondary to his drug use.  He does have history of schizophrenia.  There is no one here with him at this time.  Per past medical history of schizoaffective disorder bipolar type.  Polysubstance abuse.  He presently lacks capacity to make healthcare decisions for himself.      Review of Systems:   Pertinent positives and review of systems as noted above.  .  Patient is unable to complete a full review of systems at this time.  He is able to tell me his name.  He can tell me he is at the hospital.        --------------------------------------------- PAST HISTORY ---------------------------------------------  Past Medical History:  has a past medical history of Methamphetamine use disorder, moderate (Multi) and Schizoaffective disorder, bipolar type (Multi) (11/01/2023).    Past Surgical History:  has no past surgical history on file.    Social History:  reports that he has been smoking cigarettes. He has never used smokeless tobacco. He reports that he does not currently use drugs after having used the following drugs: Methamphetamines and  Marijuana.    Family History: family history is not on file. Unless otherwise noted, family history is non contributory    Patient's Medications   New Prescriptions    No medications on file   Previous Medications    ARIPIPRAZOLE (ABILIFY) 10 MG TABLET        ARIPIPRAZOLE (ABILIFY) 15 MG TABLET    take 1 tablet by mouth twice a day with breakfast and dinner for MOOD / PSYCHOSIS    FLUOXETINE (PROZAC) 20 MG CAPSULE    Take 3 capsules (60 mg) by mouth once daily. Do not start before November 23, 2023.    FLUOXETINE (PROZAC) 40 MG CAPSULE        HALOPERIDOL (HALDOL) 5 MG TABLET    Take 1 tablet (5 mg) by mouth 2 times a day.    HYDROXYZINE PAMOATE (VISTARIL) 50 MG CAPSULE    Take 1 capsule (50 mg) by mouth 3 times a day as needed for anxiety for up to 15 days.    LITHIUM ER (ESKALITH) 450 MG 12 HR TABLET    Take 1 tablet (450 mg) by mouth once daily at bedtime. Do not crush, chew, or split.    LITHIUM ER (LITHOBID) 300 MG 12 HR TABLET    Take 1 tablet (300 mg) by mouth once daily. Do not crush, chew, or split.  Take every morning Do not start before November 23, 2023.    NICOTINE POLACRILEX (NICORETTE) 4 MG GUM    Chew 1 each (4 mg) every 6 hours if needed for smoking cessation (nicotine craving, urge to smoke).    OLANZAPINE (ZYPREXA) 15 MG TABLET    Take 2 tablets (30 mg) by mouth once daily at bedtime.    PRAZOSIN (MINIPRESS) 1 MG CAPSULE    Take 1 capsule (1 mg) by mouth 2 times a day.    TRAZODONE (DESYREL) 100 MG TABLET    take 1 tablet by mouth daily at bedtime if needed for insomnia   Modified Medications    No medications on file   Discontinued Medications    No medications on file      The patient’s home medications have been reviewed.    Allergies: Patient has no known allergies.    -------------------------------------------------- RESULTS -------------------------------------------------  All laboratory and radiology results have been personally reviewed by myself   LABS:  Labs Reviewed   COMPREHENSIVE  METABOLIC PANEL - Abnormal       Result Value    Glucose 120 (*)     Sodium 135 (*)     Potassium 3.9      Chloride 99      Bicarbonate 27      Anion Gap 13      Urea Nitrogen 19      Creatinine 0.86      eGFR >90      Calcium 9.5      Albumin 4.2      Alkaline Phosphatase 88      Total Protein 7.2      AST 21      Bilirubin, Total 0.5      ALT 19     ACUTE TOXICOLOGY PANEL, BLOOD - Normal    Acetaminophen <10.0      Salicylate  <3      Alcohol <10     CBC WITH AUTO DIFFERENTIAL    WBC 9.1      nRBC 0.0      RBC 5.15      Hemoglobin 15.7      Hematocrit 47.2      MCV 92      MCH 30.5      MCHC 33.3      RDW 13.1      Platelets 289      Neutrophils % 73.1      Immature Granulocytes %, Automated 0.2      Lymphocytes % 13.9      Monocytes % 7.2      Eosinophils % 5.0      Basophils % 0.6      Neutrophils Absolute 6.66      Immature Granulocytes Absolute, Automated 0.02      Lymphocytes Absolute 1.26      Monocytes Absolute 0.65      Eosinophils Absolute 0.45      Basophils Absolute 0.05     DRUG SCREEN,URINE         RADIOLOGY:  Interpreted by Radiologist.  No orders to display       Encounter Date: 12/23/24   ECG 12 lead   Result Value    Ventricular Rate 86    Atrial Rate 86    UT Interval 136    QRS Duration 92    QT Interval 366    QTC Calculation(Bazett) 437    P Axis 72    R Axis 69    T Axis 40    QRS Count 15    Q Onset 217    P Onset 149    P Offset 204    T Offset 400    QTC Fredericia 412    Narrative    Sinus rhythm with marked sinus arrhythmia  Otherwise normal ECG  When compared with ECG of 11-NOV-2024 15:15,  Premature ventricular complexes are no longer Present  Nonspecific T wave abnormality, worse in Inferior leads  See ED provider note for full interpretation and clinical correlation  Confirmed by Lorena Amezcua ([id number]) on 12/27/2024 11:11:15 AM     ------------------------- NURSING NOTES AND VITALS REVIEWED ---------------------------   The nursing notes within the ED encounter and vital signs as below  have been reviewed.   /86   Pulse 99   Temp 36.6 °C (97.8 °F) (Temporal)   Resp 16   Ht 1.829 m (6')   Wt 104 kg (230 lb)   SpO2 98%   BMI 31.19 kg/m²   Oxygen Saturation Interpretation: Normal      ---------------------------------------------------PHYSICAL EXAM--------------------------------------  Physical Exam  Vitals and nursing note reviewed.   Constitutional:       General: He is not in acute distress.     Appearance: He is well-developed. He is not ill-appearing or toxic-appearing.      Comments: Patient is rocking in the chair.  Patient keeps repeating that he wants to sleep and that he wants to go.  I believe he denies suicidal ideation.  He is a very poor historian at this point.  He appears to probably be under the influence of drugs.   HENT:      Head: Normocephalic and atraumatic.      Nose: Nose normal. No congestion or rhinorrhea.      Mouth/Throat:      Mouth: Mucous membranes are moist.      Pharynx: No oropharyngeal exudate or posterior oropharyngeal erythema.   Eyes:      General: No scleral icterus.     Extraocular Movements: Extraocular movements intact.      Conjunctiva/sclera: Conjunctivae normal.      Pupils: Pupils are equal, round, and reactive to light.   Cardiovascular:      Rate and Rhythm: Normal rate and regular rhythm.      Pulses: Normal pulses.      Heart sounds: Normal heart sounds. No murmur heard.  Pulmonary:      Effort: Pulmonary effort is normal. No respiratory distress.      Breath sounds: Normal breath sounds. No wheezing, rhonchi or rales.   Abdominal:      Palpations: Abdomen is soft.      Tenderness: There is no abdominal tenderness. There is no right CVA tenderness, guarding or rebound.   Musculoskeletal:         General: No swelling, tenderness, deformity or signs of injury.      Cervical back: Normal range of motion and neck supple. No rigidity or tenderness.      Right lower leg: No edema.      Left lower leg: No edema.   Lymphadenopathy:      Cervical:  No cervical adenopathy.   Skin:     General: Skin is warm and dry.      Capillary Refill: Capillary refill takes less than 2 seconds.      Findings: No rash.   Neurological:      General: No focal deficit present.      Mental Status: He is alert. Mental status is at baseline.      Cranial Nerves: No cranial nerve deficit.      Sensory: No sensory deficit.      Motor: No weakness.      Coordination: Coordination normal.   Psychiatric:      Comments: Patient currently is unable to answer questions appropriately.  He does not appear to be a danger to himself or anyone else at this time.            Procedures  None  ------------------------------ ED COURSE/MEDICAL DECISION MAKING----------------------    Medical Decision Making:   Patient is signed into the ER.  I do think he needs assessment.  We will obtain medical clearance and reevaluate.  I am going to give him 5 mg of Haldol orally and 2 mg of Ativan orally.  We will get him a warm blanket and feed him.  He has been out in the lobby and walking around the hospital property and it is wintertime and there is snow out there and it has been snowing.  Apparently his friends dropped him off last night around 2 AM and there is no one here with him at this time.    ED Course as of 01/08/25 2035 Wed Jan 08, 2025   1151 Patient was seen and evaluated by me.  Patient currently lacks capacity to make healthcare decisions.  We will proceed with medical clearance and workup. [EC]   1151 Haldol 5 mg orally has been ordered  Ativan 2 mg orally has been ordered [EC]   1325 CBC and Auto Differential  CBC is normal [EC]   1325 Comprehensive Metabolic Panel(!)  Comprehensive metabolic panel is grossly normal [EC]   1325 Acute Toxicology Panel, Blood  Acetaminophen less than 10,  Salicylate less than 3,  All less than 10 [EC]   2006 Patient is still sleeping at this time.  He is arousable but still has some mumbling speech.  The patient is signed out at shift change to the oncoming  physician Dr. Julia Toro [EC]      ED Course User Index  [EC] Alexey Hsu DO         Diagnoses as of 01/08/25 2035   Altered mental status, unspecified altered mental status type   Polysubstance abuse (Multi)      Counseling:   The emergency provider has spoken with the patient and discussed today’s results, in addition to providing specific details for the plan of care and counseling regarding the diagnosis and prognosis.  Questions are answered at this time and they are agreeable with the plan.      --------------------------------- IMPRESSION AND DISPOSITION ---------------------------------        IMPRESSION  1. Altered mental status, unspecified altered mental status type    2. Polysubstance abuse (Multi)        DISPOSITION  Disposition: signed out to the oncoming provider Dr NURIA Toro  Patient condition is stable      Billing Provider Critical Care Time: 0 minutes     Alexey Hsu DO  01/08/25 2008       Alexey Hsu DO  01/08/25 2035

## 2025-01-09 VITALS
BODY MASS INDEX: 31.15 KG/M2 | OXYGEN SATURATION: 95 % | SYSTOLIC BLOOD PRESSURE: 130 MMHG | HEIGHT: 72 IN | DIASTOLIC BLOOD PRESSURE: 78 MMHG | WEIGHT: 230 LBS | RESPIRATION RATE: 20 BRPM | HEART RATE: 91 BPM | TEMPERATURE: 97.2 F

## 2025-01-09 PROCEDURE — 80307 DRUG TEST PRSMV CHEM ANLYZR: CPT | Performed by: EMERGENCY MEDICINE

## 2025-01-09 NOTE — PROGRESS NOTES
Alexander Zavala is a 39 y.o. male on day 0 of admission presenting with request for assistance for unknown reported medical complaint.  Patient has a history of schizoaffective disorder and medical noncompliance as well as polysubstance abuse.  Patient was apparently dropped off here overnight but did not choose to register until later today.  Patient had been medically cleared by Dr. Fuentes.  He provided patient with oral Haldol and oral Ativan due to his history of schizoaffective disorder.  Patient has been pretty much sleeping since that time.  Patient signed out to me at change of shift for reevaluation when he metabolizes the medication.      Subjective   Patient is drowsy, but arousable.  He does fall back to sleep quickly when not being question.  He is uncertain as to what prompted him to seek ER care today.  He denies suicidal homicidal ideation, auditory visual hallucinations.  He denies having dangerous living circumstances.       Objective     Physical Exam  Constitutional:       Comments: Sleepy, arousable   HENT:      Head: Normocephalic.      Nose: Nose normal.   Cardiovascular:      Rate and Rhythm: Normal rate and regular rhythm.      Pulses: Normal pulses.      Heart sounds: Normal heart sounds.   Pulmonary:      Effort: Pulmonary effort is normal.      Breath sounds: Normal breath sounds.   Musculoskeletal:         General: Normal range of motion.      Cervical back: Normal range of motion.   Skin:     General: Skin is warm and dry.      Capillary Refill: Capillary refill takes less than 2 seconds.   Neurological:      Sensory: No sensory deficit.      Gait: Gait normal.   Psychiatric:      Comments: Drowsy, arouseable, denies SI, HI         Last Recorded Vitals  Blood pressure 125/86, pulse 99, temperature 36.6 °C (97.8 °F), temperature source Temporal, resp. rate 16, height 1.829 m (6'), weight 104 kg (230 lb), SpO2 98%.  Intake/Output last 3 Shifts:  No intake/output data recorded.    Relevant  Results               Labs Reviewed   COMPREHENSIVE METABOLIC PANEL - Abnormal       Result Value    Glucose 120 (*)     Sodium 135 (*)     Potassium 3.9      Chloride 99      Bicarbonate 27      Anion Gap 13      Urea Nitrogen 19      Creatinine 0.86      eGFR >90      Calcium 9.5      Albumin 4.2      Alkaline Phosphatase 88      Total Protein 7.2      AST 21      Bilirubin, Total 0.5      ALT 19     DRUG SCREEN,URINE - Abnormal    Amphetamine Screen, Urine Presumptive Positive (*)     Barbiturate Screen, Urine Presumptive Negative      Benzodiazepines Screen, Urine Presumptive Negative      Cannabinoid Screen, Urine Presumptive Positive (*)     Cocaine Metabolite Screen, Urine Presumptive Negative      Fentanyl Screen, Urine Presumptive Negative      Opiate Screen, Urine Presumptive Negative      Oxycodone Screen, Urine Presumptive Negative      PCP Screen, Urine Presumptive Negative      Methadone Screen, Urine Presumptive Negative      Narrative:     Drug screen results are presumptive and should not be used to assess   compliance with prescribed medication. Contact the performing [facility] laboratory   to add-on definitive confirmatory testing if clinically indicated.    Toxicology screening results are reported qualitatively. The concentration must   be greater than or equal to the cutoff to be reported as positive. The concentration   at which the screening test can detect an individual drug or metabolite varies.   The absence of expected drug(s) and/or drug metabolite(s) may indicate non-compliance,   inappropriate timing of specimen collection relative to drug administration, poor drug   absorption, diluted/adulterated urine, or limitations of testing. For medical purposes   only; not valid for forensic use.    Interpretive questions should be directed to the laboratory medical directors.   ACUTE TOXICOLOGY PANEL, BLOOD - Normal    Acetaminophen <10.0      Salicylate  <3      Alcohol <10     CBC WITH AUTO  DIFFERENTIAL    WBC 9.1      nRBC 0.0      RBC 5.15      Hemoglobin 15.7      Hematocrit 47.2      MCV 92      MCH 30.5      MCHC 33.3      RDW 13.1      Platelets 289      Neutrophils % 73.1      Immature Granulocytes %, Automated 0.2      Lymphocytes % 13.9      Monocytes % 7.2      Eosinophils % 5.0      Basophils % 0.6      Neutrophils Absolute 6.66      Immature Granulocytes Absolute, Automated 0.02      Lymphocytes Absolute 1.26      Monocytes Absolute 0.65      Eosinophils Absolute 0.45      Basophils Absolute 0.05       ED Medication Administration from 01/08/2025 1139 to 01/09/2025 0257         Date/Time Order Dose Route Action Action by     01/08/2025 1150 EST LORazepam (Ativan) injection 2 mg -- intramuscular See Alternative JEANIE Puentes     01/08/2025 1150 EST LORazepam (Ativan) tablet 2 mg 2 mg oral Given JEANIE Puentes     01/08/2025 1207 EST haloperidol (Haldol) tablet 5 mg 5 mg oral Given LIAM Murray     01/08/2025 1207 EST haloperidol lactate (Haldol) injection 5 mg -- intramuscular See Alternative LIAM Murray          0017 -- patient asked RN whether he could call a ride to pick him up. Patient now states he is too tires. He is not sure why he wanted to check into the ED, but denies SI, HI, hallucinations. Says he is OK to sleep here.    0203 -- up ambulatory to , denies any needs. Peterson SI/HI, hallucinations. Says he is not sure that he can call anyone for a ride at this time of day, it OK to sleep here for awhile.      0800 - patient signed out to Dr Rodríguez pending re-evaluation and disposition           Assessment/Plan   Assessment & Plan  Altered mental status, unspecified altered mental status type    Polysubstance abuse (Multi)    Patient presents emergency department the above history and physical for no signs of sepsis, dehydration, acute abdomen, hemodynamic instability, acute medical illness or injury.  Laboratory evaluation shows normal hemoglobin, normal white count, sodium of 135, but remainder of  chemistry panel normal.  Negative alcohol, salicylates, acetaminophen.  Urine drug screen positive for amphetamines and cannabinoids as has previously been reported by patient.    We will continue the patient under safe supervision in the emergency department until he has metabolized his medication to the point that he may be accurately reassessed for safety and capacity.       I spent 0 minutes in the critical care of this patient.      Julia Toro MD

## 2025-01-21 ENCOUNTER — HOSPITAL ENCOUNTER (EMERGENCY)
Facility: HOSPITAL | Age: 40
Discharge: HOME | End: 2025-01-21
Attending: FAMILY MEDICINE
Payer: MEDICARE

## 2025-01-21 VITALS
WEIGHT: 230 LBS | HEIGHT: 72 IN | HEART RATE: 110 BPM | BODY MASS INDEX: 31.15 KG/M2 | DIASTOLIC BLOOD PRESSURE: 8 MMHG | SYSTOLIC BLOOD PRESSURE: 150 MMHG | TEMPERATURE: 97.7 F | OXYGEN SATURATION: 99 % | RESPIRATION RATE: 20 BRPM

## 2025-01-21 DIAGNOSIS — F19.10 SUBSTANCE ABUSE (MULTI): Primary | ICD-10-CM

## 2025-01-21 PROCEDURE — 99283 EMERGENCY DEPT VISIT LOW MDM: CPT

## 2025-01-21 PROCEDURE — 99281 EMR DPT VST MAYX REQ PHY/QHP: CPT | Performed by: FAMILY MEDICINE

## 2025-01-21 ASSESSMENT — COLUMBIA-SUICIDE SEVERITY RATING SCALE - C-SSRS
2. HAVE YOU ACTUALLY HAD ANY THOUGHTS OF KILLING YOURSELF?: YES
1. IN THE PAST MONTH, HAVE YOU WISHED YOU WERE DEAD OR WISHED YOU COULD GO TO SLEEP AND NOT WAKE UP?: YES
5. HAVE YOU STARTED TO WORK OUT OR WORKED OUT THE DETAILS OF HOW TO KILL YOURSELF? DO YOU INTEND TO CARRY OUT THIS PLAN?: NO
4. HAVE YOU HAD THESE THOUGHTS AND HAD SOME INTENTION OF ACTING ON THEM?: NO
6. HAVE YOU EVER DONE ANYTHING, STARTED TO DO ANYTHING, OR PREPARED TO DO ANYTHING TO END YOUR LIFE?: NO

## 2025-01-21 ASSESSMENT — PAIN - FUNCTIONAL ASSESSMENT: PAIN_FUNCTIONAL_ASSESSMENT: 0-10

## 2025-01-21 ASSESSMENT — PAIN SCALES - GENERAL: PAINLEVEL_OUTOF10: 0 - NO PAIN

## 2025-01-22 NOTE — ED PROVIDER NOTES
HPI   Chief Complaint   Patient presents with    Psychiatric Evaluation       39-year-old male with history of polysubstance abuse and schizophrenia walked into the ED and send to self in for evaluation.  He proceeded to sit in the lobby and began to eat some food and was smiling and interacting with others.  Patient shortly thereafter came back to the ED room and laid there comfortably.  Patient upon arrival to the ED was alert, cooperative, and did not appear to be in distress.  Patient mumbling in the room asking to be allowed to rest after something to eat.  Patient reports at this time he does not want harm himself or others and wants somewhere to stay.  Patient currently denies SI, HI, hallucinations, and any physical complaints.  Patient does not comment on doing any drugs today.      History provided by:  Medical records and patient   used: No            Patient History   Past Medical History:   Diagnosis Date    Methamphetamine use disorder, moderate (Multi)     Schizoaffective disorder, bipolar type (Multi) 11/01/2023    R/o schizophrenia       History reviewed. No pertinent surgical history.  No family history on file.  Social History     Tobacco Use    Smoking status: Every Day     Types: Cigarettes    Smokeless tobacco: Never   Vaping Use    Vaping status: Some Days   Substance Use Topics    Alcohol use: Not on file    Drug use: Not Currently     Types: Methamphetamines, Marijuana       Physical Exam   ED Triage Vitals   Temperature Heart Rate Respirations BP   01/21/25 1841 01/21/25 1841 01/21/25 1841 01/21/25 1841   36.4 °C (97.6 °F) (!) 118 19 (!) 158/94      SpO2 Temp Source Heart Rate Source Patient Position   01/21/25 1841 01/21/25 1921 -- --   100 % Tympanic        BP Location FiO2 (%)     -- --             Physical Exam  Vitals and nursing note reviewed.   Constitutional:       General: He is not in acute distress.     Appearance: He is well-developed.   HENT:      Head:  Normocephalic and atraumatic.   Eyes:      Conjunctiva/sclera: Conjunctivae normal.   Cardiovascular:      Rate and Rhythm: Normal rate and regular rhythm.      Heart sounds: No murmur heard.  Pulmonary:      Effort: Pulmonary effort is normal. No respiratory distress.      Breath sounds: Normal breath sounds.   Abdominal:      Palpations: Abdomen is soft.      Tenderness: There is no abdominal tenderness.   Musculoskeletal:         General: No swelling.      Cervical back: Neck supple.   Skin:     General: Skin is warm and dry.      Capillary Refill: Capillary refill takes less than 2 seconds.   Neurological:      General: No focal deficit present.      Mental Status: He is alert and oriented to person, place, and time.      GCS: GCS eye subscore is 4. GCS verbal subscore is 5. GCS motor subscore is 6.   Psychiatric:         Attention and Perception: Attention and perception normal.         Mood and Affect: Mood normal. Affect is flat.         Speech: Speech is delayed.         Behavior: Behavior is slowed. Behavior is not agitated, aggressive, withdrawn, hyperactive or combative. Behavior is cooperative.         Thought Content: Thought content normal.         Cognition and Memory: Cognition normal.         Judgment: Judgment normal.           ED Course & MDM   Diagnoses as of 01/21/25 1941   Substance abuse (Multi)                 No data recorded                                 Medical Decision Making  Pt upon arrival to the ED appeared to be comfortable and in no distress with stable vitals.  Discussed with pt the presenting complaints and clinically findings.  Reviewed pt's epic chart and counseled the patient on polysubstance abuse and appropriate approach to management/treatments.  After assessment and evaluation patient continued to be cooperative with staff and exhibiting no erratic behavior or bizarre behavior.  Patient continued to deny any kind of SI or HI and further counseling was provided to the  patient regarding substance abuse and need for cessation of his recurrent meth abuse.   Patient at this time continues not exhibit concerns regarding need for admission for threat to himself or others but continued to be encouraged to comply with follow-up with psychiatrist as well as his home medications.  At this point patient continue did not exhibit concerns regarding need for admission for psychiatric care and advised again to comply with recommendations regarding appropriate follow-up care.  At this time patient was discharged home.         Amount and/or Complexity of Data Reviewed  External Data Reviewed: labs, radiology and notes.        Procedure  Procedures     Paxton Rodríguez MD  01/21/25 1956

## 2025-02-02 ENCOUNTER — HOSPITAL ENCOUNTER (EMERGENCY)
Facility: HOSPITAL | Age: 40
Discharge: HOME | End: 2025-02-03
Attending: EMERGENCY MEDICINE
Payer: MEDICARE

## 2025-02-02 DIAGNOSIS — F15.10 METHAMPHETAMINE ABUSE (MULTI): ICD-10-CM

## 2025-02-02 DIAGNOSIS — F19.94 SUBSTANCE INDUCED MOOD DISORDER (MULTI): ICD-10-CM

## 2025-02-02 DIAGNOSIS — F32.A DEPRESSION, UNSPECIFIED DEPRESSION TYPE: Primary | ICD-10-CM

## 2025-02-02 LAB
ALBUMIN SERPL BCP-MCNC: 4.1 G/DL (ref 3.4–5)
ALP SERPL-CCNC: 76 U/L (ref 33–120)
ALT SERPL W P-5'-P-CCNC: 14 U/L (ref 10–52)
ANION GAP SERPL CALC-SCNC: 11 MMOL/L (ref 10–20)
APAP SERPL-MCNC: <10 UG/ML
AST SERPL W P-5'-P-CCNC: 15 U/L (ref 9–39)
BASOPHILS # BLD AUTO: 0.07 X10*3/UL (ref 0–0.1)
BASOPHILS NFR BLD AUTO: 1.1 %
BILIRUB SERPL-MCNC: 0.5 MG/DL (ref 0–1.2)
BUN SERPL-MCNC: 14 MG/DL (ref 6–23)
CALCIUM SERPL-MCNC: 9 MG/DL (ref 8.6–10.3)
CHLORIDE SERPL-SCNC: 102 MMOL/L (ref 98–107)
CO2 SERPL-SCNC: 24 MMOL/L (ref 21–32)
CREAT SERPL-MCNC: 0.73 MG/DL (ref 0.5–1.3)
EGFRCR SERPLBLD CKD-EPI 2021: >90 ML/MIN/1.73M*2
EOSINOPHIL # BLD AUTO: 0.41 X10*3/UL (ref 0–0.7)
EOSINOPHIL NFR BLD AUTO: 6.2 %
ERYTHROCYTE [DISTWIDTH] IN BLOOD BY AUTOMATED COUNT: 12.6 % (ref 11.5–14.5)
ETHANOL SERPL-MCNC: <10 MG/DL
GLUCOSE SERPL-MCNC: 97 MG/DL (ref 74–99)
HCT VFR BLD AUTO: 46.3 % (ref 41–52)
HGB BLD-MCNC: 15.5 G/DL (ref 13.5–17.5)
IMM GRANULOCYTES # BLD AUTO: 0.01 X10*3/UL (ref 0–0.7)
IMM GRANULOCYTES NFR BLD AUTO: 0.2 % (ref 0–0.9)
LYMPHOCYTES # BLD AUTO: 2.09 X10*3/UL (ref 1.2–4.8)
LYMPHOCYTES NFR BLD AUTO: 31.6 %
MCH RBC QN AUTO: 29.9 PG (ref 26–34)
MCHC RBC AUTO-ENTMCNC: 33.5 G/DL (ref 32–36)
MCV RBC AUTO: 89 FL (ref 80–100)
MONOCYTES # BLD AUTO: 0.4 X10*3/UL (ref 0.1–1)
MONOCYTES NFR BLD AUTO: 6 %
NEUTROPHILS # BLD AUTO: 3.64 X10*3/UL (ref 1.2–7.7)
NEUTROPHILS NFR BLD AUTO: 54.9 %
NRBC BLD-RTO: 0 /100 WBCS (ref 0–0)
PLATELET # BLD AUTO: 248 X10*3/UL (ref 150–450)
POTASSIUM SERPL-SCNC: 3.2 MMOL/L (ref 3.5–5.3)
PROT SERPL-MCNC: 7 G/DL (ref 6.4–8.2)
RBC # BLD AUTO: 5.18 X10*6/UL (ref 4.5–5.9)
SALICYLATES SERPL-MCNC: <3 MG/DL
SODIUM SERPL-SCNC: 134 MMOL/L (ref 136–145)
WBC # BLD AUTO: 6.6 X10*3/UL (ref 4.4–11.3)

## 2025-02-02 PROCEDURE — 85025 COMPLETE CBC W/AUTO DIFF WBC: CPT | Performed by: EMERGENCY MEDICINE

## 2025-02-02 PROCEDURE — 80053 COMPREHEN METABOLIC PANEL: CPT | Performed by: EMERGENCY MEDICINE

## 2025-02-02 PROCEDURE — 99285 EMERGENCY DEPT VISIT HI MDM: CPT | Performed by: EMERGENCY MEDICINE

## 2025-02-02 PROCEDURE — 80320 DRUG SCREEN QUANTALCOHOLS: CPT | Performed by: EMERGENCY MEDICINE

## 2025-02-02 PROCEDURE — 36415 COLL VENOUS BLD VENIPUNCTURE: CPT | Performed by: EMERGENCY MEDICINE

## 2025-02-02 PROCEDURE — 80307 DRUG TEST PRSMV CHEM ANLYZR: CPT | Performed by: EMERGENCY MEDICINE

## 2025-02-02 PROCEDURE — 81003 URINALYSIS AUTO W/O SCOPE: CPT | Performed by: EMERGENCY MEDICINE

## 2025-02-02 SDOH — HEALTH STABILITY: MENTAL HEALTH
OTHER SUICIDE PRECAUTIONS INCLUDE: PATIENT PLACED IN AN EASILY OBSERVABLE ROOM WITH DOOR/CURTAIN REMAINING OPEN;PATIENT PLACED IN GOWN (SNAPS OR PAPER GOWNS PREFERRED) AND WANDED;REMAINING RISKS IDENTIFIED AND MITIGATED;PATIENT PLACED IN PSYCH SAFE ROOM (IF AVAILABLE);ELOPEMENT RISK IDENTIFIED;PROVIDER NOTIFIED;PERSONAL BELONGINGS SECURED

## 2025-02-02 SDOH — HEALTH STABILITY: MENTAL HEALTH: HAVE YOU EVER DONE ANYTHING, STARTED TO DO ANYTHING, OR PREPARED TO DO ANYTHING TO END YOUR LIFE?: NO

## 2025-02-02 SDOH — HEALTH STABILITY: MENTAL HEALTH: BEHAVIORAL HEALTH(WDL): EXCEPTIONS TO WDL

## 2025-02-02 SDOH — HEALTH STABILITY: MENTAL HEALTH: SLEEP PATTERN: UNABLE TO ASSESS

## 2025-02-02 SDOH — HEALTH STABILITY: MENTAL HEALTH: SUICIDE ASSESSMENT: ADULT (C-SSRS)

## 2025-02-02 SDOH — HEALTH STABILITY: MENTAL HEALTH: HAVE YOU ACTUALLY HAD ANY THOUGHTS OF KILLING YOURSELF?: NO

## 2025-02-02 SDOH — HEALTH STABILITY: MENTAL HEALTH: HAVE YOU WISHED YOU WERE DEAD OR WISHED YOU COULD GO TO SLEEP AND NOT WAKE UP?: NO

## 2025-02-02 SDOH — HEALTH STABILITY: MENTAL HEALTH: BEHAVIORS/MOOD: RESTLESS;COOPERATIVE

## 2025-02-02 ASSESSMENT — PAIN SCALES - GENERAL
PAINLEVEL_OUTOF10: 0 - NO PAIN
PAINLEVEL_OUTOF10: 0 - NO PAIN

## 2025-02-02 ASSESSMENT — PAIN - FUNCTIONAL ASSESSMENT: PAIN_FUNCTIONAL_ASSESSMENT: 0-10

## 2025-02-02 ASSESSMENT — PAIN DESCRIPTION - PROGRESSION: CLINICAL_PROGRESSION: NOT CHANGED

## 2025-02-03 ENCOUNTER — APPOINTMENT (OUTPATIENT)
Dept: CARDIOLOGY | Facility: HOSPITAL | Age: 40
End: 2025-02-03
Payer: MEDICARE

## 2025-02-03 VITALS
HEART RATE: 83 BPM | BODY MASS INDEX: 28.32 KG/M2 | RESPIRATION RATE: 18 BRPM | HEIGHT: 61 IN | DIASTOLIC BLOOD PRESSURE: 65 MMHG | SYSTOLIC BLOOD PRESSURE: 106 MMHG | OXYGEN SATURATION: 100 % | WEIGHT: 150 LBS | TEMPERATURE: 96.7 F

## 2025-02-03 LAB
AMPHETAMINES UR QL SCN: ABNORMAL
APPEARANCE UR: CLEAR
BARBITURATES UR QL SCN: ABNORMAL
BENZODIAZ UR QL SCN: ABNORMAL
BILIRUB UR STRIP.AUTO-MCNC: NEGATIVE MG/DL
BZE UR QL SCN: ABNORMAL
CANNABINOIDS UR QL SCN: ABNORMAL
COLOR UR: YELLOW
FENTANYL+NORFENTANYL UR QL SCN: ABNORMAL
GLUCOSE UR STRIP.AUTO-MCNC: NEGATIVE MG/DL
HOLD SPECIMEN: NORMAL
HOLD SPECIMEN: NORMAL
KETONES UR STRIP.AUTO-MCNC: NEGATIVE MG/DL
LEUKOCYTE ESTERASE UR QL STRIP.AUTO: NEGATIVE
METHADONE UR QL SCN: ABNORMAL
NITRITE UR QL STRIP.AUTO: NEGATIVE
OPIATES UR QL SCN: ABNORMAL
OXYCODONE+OXYMORPHONE UR QL SCN: ABNORMAL
PCP UR QL SCN: ABNORMAL
PH UR STRIP.AUTO: 6 [PH]
PROT UR STRIP.AUTO-MCNC: NEGATIVE MG/DL
RBC # UR STRIP.AUTO: NEGATIVE /UL
SP GR UR STRIP.AUTO: 1.02
UROBILINOGEN UR STRIP.AUTO-MCNC: 2 MG/DL

## 2025-02-03 PROCEDURE — 2500000002 HC RX 250 W HCPCS SELF ADMINISTERED DRUGS (ALT 637 FOR MEDICARE OP, ALT 636 FOR OP/ED): Mod: MUE | Performed by: EMERGENCY MEDICINE

## 2025-02-03 PROCEDURE — 93005 ELECTROCARDIOGRAM TRACING: CPT

## 2025-02-03 RX ORDER — POTASSIUM CHLORIDE 20 MEQ/1
40 TABLET, EXTENDED RELEASE ORAL ONCE
Status: COMPLETED | OUTPATIENT
Start: 2025-02-03 | End: 2025-02-03

## 2025-02-03 RX ADMIN — POTASSIUM CHLORIDE 40 MEQ: 1500 TABLET, EXTENDED RELEASE ORAL at 01:48

## 2025-02-03 SDOH — HEALTH STABILITY: MENTAL HEALTH: BEHAVIORS/MOOD: CALM;COOPERATIVE

## 2025-02-03 SDOH — HEALTH STABILITY: MENTAL HEALTH: IN THE PAST FEW WEEKS, HAVE YOU WISHED YOU WERE DEAD?: YES

## 2025-02-03 SDOH — HEALTH STABILITY: MENTAL HEALTH: NEEDS EXPRESSED: DENIES

## 2025-02-03 SDOH — HEALTH STABILITY: MENTAL HEALTH: HAVE YOU HAD THESE THOUGHTS AND HAD SOME INTENTION OF ACTING ON THEM?: NO

## 2025-02-03 SDOH — HEALTH STABILITY: MENTAL HEALTH: HAVE YOU WISHED YOU WERE DEAD OR WISHED YOU COULD GO TO SLEEP AND NOT WAKE UP?: NO

## 2025-02-03 SDOH — HEALTH STABILITY: MENTAL HEALTH: HAVE YOU ACTUALLY HAD ANY THOUGHTS OF KILLING YOURSELF?: YES

## 2025-02-03 SDOH — HEALTH STABILITY: MENTAL HEALTH: ACTIVE SUICIDAL IDEATION WITH SOME INTENT TO ACT, WITHOUT SPECIFIC PLAN (PAST 1 MONTH): NO

## 2025-02-03 SDOH — HEALTH STABILITY: MENTAL HEALTH: HAVE YOU BEEN THINKING ABOUT HOW YOU MIGHT DO THIS?: NO

## 2025-02-03 SDOH — HEALTH STABILITY: MENTAL HEALTH: ACTIVE SUICIDAL IDEATION WITH SPECIFIC PLAN AND INTENT (PAST 1 MONTH): NO

## 2025-02-03 SDOH — HEALTH STABILITY: MENTAL HEALTH

## 2025-02-03 SDOH — HEALTH STABILITY: MENTAL HEALTH: HAVE YOU EVER TRIED TO KILL YOURSELF?: YES

## 2025-02-03 SDOH — HEALTH STABILITY: MENTAL HEALTH: HAVE YOU EVER DONE ANYTHING, STARTED TO DO ANYTHING, OR PREPARED TO DO ANYTHING TO END YOUR LIFE?: NO

## 2025-02-03 SDOH — HEALTH STABILITY: MENTAL HEALTH: ANXIETY SYMPTOMS: GENERALIZED

## 2025-02-03 SDOH — HEALTH STABILITY: MENTAL HEALTH: BEHAVIORAL HEALTH(WDL): WITHIN DEFINED LIMITS

## 2025-02-03 SDOH — ECONOMIC STABILITY: HOUSING INSECURITY: FEELS SAFE LIVING IN HOME: OTHER (COMMENT)

## 2025-02-03 SDOH — HEALTH STABILITY: MENTAL HEALTH: SLEEP PATTERN: UNABLE TO ASSESS

## 2025-02-03 SDOH — HEALTH STABILITY: MENTAL HEALTH: SUICIDAL BEHAVIOR (LIFETIME): YES

## 2025-02-03 SDOH — HEALTH STABILITY: MENTAL HEALTH: IN THE PAST WEEK, HAVE YOU BEEN HAVING THOUGHTS ABOUT KILLING YOURSELF?: YES

## 2025-02-03 SDOH — HEALTH STABILITY: MENTAL HEALTH
OTHER SUICIDE PRECAUTIONS INCLUDE: PATIENT PLACED IN AN EASILY OBSERVABLE ROOM WITH DOOR/CURTAIN REMAINING OPEN;PATIENT PLACED IN GOWN (SNAPS OR PAPER GOWNS PREFERRED) AND WANDED;REMAINING RISKS IDENTIFIED AND MITIGATED;PATIENT PLACED IN PSYCH SAFE ROOM (IF AVAILABLE);PROVIDER NOTIFIED;ELOPEMENT RISK IDENTIFIED;FREQUENT ROUNDING WITH IRREGULAR CHECKS AT MINIMUM OF EVERY 15 MINUTES TO ASSESS PSYCH SAFETY PERFORMED

## 2025-02-03 SDOH — HEALTH STABILITY: MENTAL HEALTH: SUICIDE ASSESSMENT: ADULT (C-SSRS)

## 2025-02-03 SDOH — HEALTH STABILITY: MENTAL HEALTH
OTHER SUICIDE PRECAUTIONS INCLUDE: PATIENT PLACED IN GOWN (SNAPS OR PAPER GOWNS PREFERRED) AND WANDED;REMAINING RISKS IDENTIFIED AND MITIGATED;PATIENT PLACED IN PSYCH SAFE ROOM (IF AVAILABLE);FREQUENT ROUNDING WITH IRREGULAR CHECKS AT MINIMUM OF EVERY 15 MINUTES TO ASSESS PSYCH SAFETY PERFORMED;PATIENT PLACED IN AN EASILY OBSERVABLE ROOM WITH DOOR/CURTAIN REMAINING OPEN

## 2025-02-03 SDOH — HEALTH STABILITY: MENTAL HEALTH: IN THE PAST FEW WEEKS, HAVE YOU FELT THAT YOU OR YOUR FAMILY WOULD BE BETTER OFF IF YOU WERE DEAD?: NO

## 2025-02-03 SDOH — HEALTH STABILITY: MENTAL HEALTH: DEPRESSION SYMPTOMS: SLEEP DISTURBANCE;FEELINGS OF HELPLESSNESS

## 2025-02-03 SDOH — HEALTH STABILITY: MENTAL HEALTH
OTHER SUICIDE PRECAUTIONS INCLUDE: PATIENT PLACED IN AN EASILY OBSERVABLE ROOM WITH DOOR/CURTAIN REMAINING OPEN;PATIENT PLACED IN GOWN (SNAPS OR PAPER GOWNS PREFERRED) AND WANDED;REMAINING RISKS IDENTIFIED AND MITIGATED;PATIENT PLACED IN PSYCH SAFE ROOM (IF AVAILABLE);PROVIDER NOTIFIED;ELOPEMENT RISK IDENTIFIED;FREQUENT ROUNDING WITH IRREGULAR CHECKS AT MINIMUM OF EVERY 15 MINUTES TO ASSESS PSYCH SAFETY PERFORMED;PERSONAL BELONGINGS SECURED

## 2025-02-03 SDOH — HEALTH STABILITY: MENTAL HEALTH: NON-SPECIFIC ACTIVE SUICIDAL THOUGHTS (PAST 1 MONTH): YES

## 2025-02-03 SDOH — HEALTH STABILITY: MENTAL HEALTH: ARE YOU HAVING THOUGHTS OF KILLING YOURSELF RIGHT NOW?: NO

## 2025-02-03 SDOH — HEALTH STABILITY: MENTAL HEALTH: WISH TO BE DEAD (PAST 1 MONTH): YES

## 2025-02-03 ASSESSMENT — PAIN DESCRIPTION - PAIN TYPE: TYPE: ACUTE PAIN

## 2025-02-03 ASSESSMENT — PAIN SCALES - GENERAL: PAINLEVEL_OUTOF10: 2

## 2025-02-03 ASSESSMENT — PAIN DESCRIPTION - LOCATION: LOCATION: EYE

## 2025-02-03 ASSESSMENT — PAIN - FUNCTIONAL ASSESSMENT: PAIN_FUNCTIONAL_ASSESSMENT: 0-10

## 2025-02-03 ASSESSMENT — LIFESTYLE VARIABLES
SUBSTANCE_ABUSE_PAST_12_MONTHS: YES
PRESCIPTION_ABUSE_PAST_12_MONTHS: YES

## 2025-02-03 ASSESSMENT — PAIN DESCRIPTION - DESCRIPTORS: DESCRIPTORS: ACHING;DISCOMFORT

## 2025-02-03 ASSESSMENT — PAIN DESCRIPTION - FREQUENCY: FREQUENCY: INTERMITTENT

## 2025-02-03 ASSESSMENT — PAIN DESCRIPTION - ORIENTATION: ORIENTATION: LEFT

## 2025-02-03 NOTE — ED PROVIDER NOTES
Emergency Medicine Transition of Care Note.    I received Alexander Zavala in signout from Dr. Toro.  Please see the previous ED provider note for all HPI, PE and MDM up to the time of signout at 08:00. This is in addition to the primary record.    In brief Alexander Zavala is an 39 y.o. male presenting for   Chief Complaint   Patient presents with    Addiction Problem     Meth withdrawal      At the time of signout we were awaiting: Evaluation and disposition per psychiatry    ED Course as of 02/03/25 0813 Mon Feb 03, 2025   0015 POTASSIUM(!): 3.2  Potassium replacement ordered [MN]   0025 Medically clear for psychiatry evaluation and treatment [MN]   0811 I spoke with Joseph with [facility].  Passive vague thoughts without intent and plan.  This is his baseline.  Joseph offered help with residential substance use treatment.  Patient declined.  He was advised to follow up with [facility].  He was advised to return with worsening depression suicidal thoughts or any other concerns.  Patient agreeable with plan and verbalized understanding.  Discharged home. [BT]      ED Course User Index  [BT] Cornel Myers DO  [MN] Julia Toro MD         Diagnoses as of 02/03/25 0813   Depression, unspecified depression type   Substance induced mood disorder (Multi)   Methamphetamine abuse (Multi)       Medical Decision Making      Final diagnoses:   [F32.A] Depression, unspecified depression type   [F19.94] Substance induced mood disorder (Multi)   [F15.10] Methamphetamine abuse (Multi)           Procedure  Procedures    DO Cornel Luna DO  02/03/25 0813

## 2025-02-03 NOTE — PROGRESS NOTES
"EPAT - Social Work Psychiatric Assessment    Arrival Details  Mode of Arrival: Ambulatory  Admission Source: Other (Comment)  Admission Type: Involuntary  EPAT Assessment Start Date: 02/03/25  EPAT Assessment Start Time: 0752  Name of : Joseph Napoles    History of Present Illness  Admission Reason: Polysubstance Use, Self harming  HPI: Patient is a 39 year old male with a history of Psychosis and Polysubstance use. Last night he came to the ED stating he was intoxicated and feeling depressed suicidal. A review of the patient's Triage, Provider and [place] was conducted.    SW Readmission Information   Readmission within 30 Days: No    Psychiatric Symptoms  Anxiety Symptoms: Generalized  Depression Symptoms: Sleep disturbance, Feelings of helplessness  Josy Symptoms: No problems reported or observed.    Psychosis Symptoms  Hallucination Type: No problems reported or observed.  Delusion Type: No problems reported or observed.    Additional Symptoms - Adult  Generalized Anxiety Disorder: Excessive anxiety/worry  Obsessive Compulsive Disorder: No problems reported or observed.  Panic Attack: No problems reported or observed.  Post Traumatic Stress Disorder: No problems reported or observed.  Delirium: No problems reported or observed.  Review of Symptoms Comments: Please see above    Past Psychiatric History/Meds/Treatments  Past Psychiatric History: Patient has a history of UnSpecified Psychosis and Polysubstance use. He is involved with [facility] in [place]. He reports he \"sometimes\" take his medications. He does have a history of self harm and substance treatment.  Past Psychiatric Meds/Treatments: see med list.  Past Violence/Victimization History: n/a    Current Mental Health Contacts   Name/Phone Number: [facility]   Last Appointment Date: unknown  Provider Name/Phone Number: [facility]  Provider Last Appointment Date: unknown    Support " System: Community    Living Arrangement: Apartment    Home Safety  Feels Safe Living in Home: Other (Comment)         [place] Service/Education History  Current or Previous  Service: None  Education Level: High school  History of Learning Problems: No  History of School Behavior Problems: No  School History: see above    Social/Cultural History  Social History: Patient is his own guardian. His stressors are substance use.  Important Activities: Other (Comment)    Legal  Legal Concerns: n/a    Drug Screening  Have you used any substances (canabis, cocaine, heroin, hallucinogens, inhalants, etc.) in the past 12 months?: Yes  Have you used any prescription drugs other than prescribed in the past 12 months?: Yes  Is a toxicology screen needed?: Yes    Stage of Change  Stage of Change: Precontemplation  History of Treatment: Inpatient, Dual, AA/NA meetings, IOP, Sober living  Type of Treatment Offered: Inpatient, IOP, Individual, AA/NA meeting resource  Treatment Offered: Declined  Duration of Substance Use: daily  Frequency of Substance Use: n/a  Age of First Substance Use: n/a    Behavioral Health  Behavioral Health(WDL): Exceptions to WDL  Behaviors/Mood: Anxious, Cooperative, Restless  Affect: Inconsistent with mood  Parent/Guardian/Significant Other Involvement: No involvement    Orientation  Orientation Level: Oriented X4    General Appearance  Motor Activity: Unremarkable  Speech Pattern: Pressured  General Attitude: Cooperative  Appearance/Hygiene: Disheveled, Poor hygiene    Thought Process  Coherency: Other (Comment)  Content: Unremarkable  Delusions: Other (Comment)  Perception: Not altered  Hallucination: None  Judgment/Insight: Limited  Confusion: None  Cognition: Follows commands    Sleep Pattern  Sleep Pattern: Difficulty falling asleep    Risk Factors  Self Harm/Suicidal Ideation Plan: SI no plan  Previous Self Harm/Suicidal Plans: hx of past attempts  Risk Factors: None    Violence Risk  Assessment  Assessment of Violence: None noted  Thoughts of Harm to Others: No    Ability to Assess Risk Screen  Risk Screen - Ability to Assess: Able to be screened  Ask Suicide-Screening Questions  1. In the past few weeks, have you wished you were dead?: Yes  2. In the past few weeks, have you felt that you or your family would be better off if you were dead?: No  3. In the past week, have you been having thoughts about killing yourself?: Yes  4. Have you ever tried to kill yourself?: Yes  5. Are you having thoughts of killing yourself right now?: No  Calculated Risk Score: Potential Risk  [place] Suicide Severity Rating Scale (Screener/Recent Self-Report)  1. Wish to be Dead (Past 1 Month): Yes  2. Non-Specific Active Suicidal Thoughts (Past 1 Month): Yes  3. Active Suicidal Ideation with any Methods (Not Plan) Without Intent to Act (Past 1 Month): No  4. Active Suicidal Ideation with Some Intent to Act, Without Specific Plan (Past 1 Month): No  5. Active Suicidal Ideation with Specific Plan and Intent (Past 1 Month): No  6. Suicidal Behavior (Lifetime): Yes  Calculated C-SSRS Risk Score (Lifetime/Recent): Moderate Risk  Step 1: Risk Factors  Current & Past Psychiatric Dx: Mood disorder, Psychotic disorder, Alcohol/substance abuse disorders  Presenting Symptoms: Anxiety and/or panic  Family History: Other (Comment)  Precipitants/Stressors: Triggering events leading to humiliation, shame, and/or despair (e.g. loss of relationship, financial or health status) (real or anticipated), Substance intoxication or withdrawal  Change in Treatment: Non-compliant or not receiving treatment  Access to Lethal Methods : No  Step 2: Protective Factors   Protective Factors Internal: Identifies reasons for living  Protective Factors External: Cultural, spiritual and/or moral attitudes against suicide  Step 3: Suicidal Ideation Intensity  How Many Times Have You Had These Thoughts: Less than once a week  When You Have the  "Thoughts How Long do They Last : Fleeting - few seconds or minutes  Could/Can You Stop Thinking About Killing Yourself or Wanting to Die if You Want to: Easily able to control thoughts  Are There Things - Anyone or Anything - That Stopped You From Wanting to Die or Acting on: Deterrents definitely stopped you from attempting suicide  What Sort of Reasons Did You Have For Thinking About Wanting to Die or Killing Yourself: Completely to get attention, revenge, or a reaction from others  Total Score: 5  Step 5: Documentation  Risk Level: Moderate suicide risk (Patient is moderate risk. Provider agrees.)    Psychiatric Impression and Plan of Care  Assessment and Plan: Patient is a 39 year old male with a history of Psychosis and Polysubstance Use. The patient has had several ED visits in the past 12 months. He reports using amphetamines over the past few days. The patient has a history of substance use and treamtent. We discussed his use and the patient denied any referrals or services stating the programs \"took too long\". He did state he has thoughts of \"not wanting to be around\". He denied any specific plan. He does have a history of past attempts according to past reports. The patient appeared disheveled. He reports hearing and seeing things/hallucinations but not in the past 1-2 days. He states he \"wants a place to sleep\". The patient has a history of non compliance with his outpatient support services. He has minimal supports. He follows up with [facility].  Specific Resources Provided to Patient: [facility] Notified: no  PHP/IOP Recommended: none  Specific Information Provided for PHP/IOP: none  Plan Comments: discharge    Outcome/Disposition  Patient's Perception of Outcome Achieved: patient is help seeking  Assessment, Recommendations and Risk Level Reviewed with: discharge  Contact Name: Jeff  Contact Number(s): ?  Contact Relationship: ?  EPAT Assessment Completed Date: 02/03/25  EPAT " Assessment Completed Time: 0830  Patient Disposition: Home

## 2025-02-04 LAB
ATRIAL RATE: 58 BPM
P AXIS: 71 DEGREES
P OFFSET: 189 MS
P ONSET: 135 MS
PR INTERVAL: 164 MS
Q ONSET: 217 MS
QRS COUNT: 9 BEATS
QRS DURATION: 92 MS
QT INTERVAL: 452 MS
QTC CALCULATION(BAZETT): 443 MS
QTC FREDERICIA: 446 MS
R AXIS: 76 DEGREES
T AXIS: 82 DEGREES
T OFFSET: 443 MS
VENTRICULAR RATE: 58 BPM

## 2025-02-14 ENCOUNTER — HOSPITAL ENCOUNTER (EMERGENCY)
Facility: HOSPITAL | Age: 40
Discharge: PSYCHIATRIC OR SUBSTANCE USE FACILITY | End: 2025-02-15
Attending: EMERGENCY MEDICINE
Payer: MEDICARE

## 2025-02-14 ENCOUNTER — APPOINTMENT (OUTPATIENT)
Dept: CARDIOLOGY | Facility: HOSPITAL | Age: 40
End: 2025-02-14
Payer: MEDICARE

## 2025-02-14 DIAGNOSIS — F25.9 SCHIZOAFFECTIVE DISORDER, UNSPECIFIED TYPE: Primary | ICD-10-CM

## 2025-02-14 LAB
ALBUMIN SERPL BCP-MCNC: 4 G/DL (ref 3.4–5)
ALP SERPL-CCNC: 70 U/L (ref 33–120)
ALT SERPL W P-5'-P-CCNC: 11 U/L (ref 10–52)
ANION GAP SERPL CALC-SCNC: 12 MMOL/L (ref 10–20)
APAP SERPL-MCNC: <10 UG/ML
AST SERPL W P-5'-P-CCNC: 14 U/L (ref 9–39)
BASOPHILS # BLD AUTO: 0.05 X10*3/UL (ref 0–0.1)
BASOPHILS NFR BLD AUTO: 0.9 %
BILIRUB SERPL-MCNC: 0.7 MG/DL (ref 0–1.2)
BUN SERPL-MCNC: 16 MG/DL (ref 6–23)
CALCIUM SERPL-MCNC: 9.2 MG/DL (ref 8.6–10.3)
CHLORIDE SERPL-SCNC: 105 MMOL/L (ref 98–107)
CO2 SERPL-SCNC: 22 MMOL/L (ref 21–32)
CREAT SERPL-MCNC: 0.69 MG/DL (ref 0.5–1.3)
EGFRCR SERPLBLD CKD-EPI 2021: >90 ML/MIN/1.73M*2
EOSINOPHIL # BLD AUTO: 0.33 X10*3/UL (ref 0–0.7)
EOSINOPHIL NFR BLD AUTO: 6.2 %
ERYTHROCYTE [DISTWIDTH] IN BLOOD BY AUTOMATED COUNT: 12.5 % (ref 11.5–14.5)
ETHANOL SERPL-MCNC: <10 MG/DL
GLUCOSE SERPL-MCNC: 85 MG/DL (ref 74–99)
HCT VFR BLD AUTO: 46.6 % (ref 41–52)
HGB BLD-MCNC: 15.9 G/DL (ref 13.5–17.5)
IMM GRANULOCYTES # BLD AUTO: 0.01 X10*3/UL (ref 0–0.7)
IMM GRANULOCYTES NFR BLD AUTO: 0.2 % (ref 0–0.9)
INR PPP: 1.2 (ref 0.9–1.1)
LACTATE SERPL-SCNC: 0.9 MMOL/L (ref 0.4–2)
LYMPHOCYTES # BLD AUTO: 1.33 X10*3/UL (ref 1.2–4.8)
LYMPHOCYTES NFR BLD AUTO: 25.1 %
MCH RBC QN AUTO: 30.3 PG (ref 26–34)
MCHC RBC AUTO-ENTMCNC: 34.1 G/DL (ref 32–36)
MCV RBC AUTO: 89 FL (ref 80–100)
MONOCYTES # BLD AUTO: 0.33 X10*3/UL (ref 0.1–1)
MONOCYTES NFR BLD AUTO: 6.2 %
NEUTROPHILS # BLD AUTO: 3.25 X10*3/UL (ref 1.2–7.7)
NEUTROPHILS NFR BLD AUTO: 61.4 %
NRBC BLD-RTO: 0 /100 WBCS (ref 0–0)
PLATELET # BLD AUTO: 219 X10*3/UL (ref 150–450)
POTASSIUM SERPL-SCNC: 3.8 MMOL/L (ref 3.5–5.3)
PROT SERPL-MCNC: 6.8 G/DL (ref 6.4–8.2)
PROTHROMBIN TIME: 13.7 SECONDS (ref 9.8–12.8)
RBC # BLD AUTO: 5.24 X10*6/UL (ref 4.5–5.9)
SALICYLATES SERPL-MCNC: <3 MG/DL
SODIUM SERPL-SCNC: 135 MMOL/L (ref 136–145)
WBC # BLD AUTO: 5.3 X10*3/UL (ref 4.4–11.3)

## 2025-02-14 PROCEDURE — 80179 DRUG ASSAY SALICYLATE: CPT | Performed by: EMERGENCY MEDICINE

## 2025-02-14 PROCEDURE — 2500000001 HC RX 250 WO HCPCS SELF ADMINISTERED DRUGS (ALT 637 FOR MEDICARE OP): Performed by: EMERGENCY MEDICINE

## 2025-02-14 PROCEDURE — 82077 ASSAY SPEC XCP UR&BREATH IA: CPT | Performed by: EMERGENCY MEDICINE

## 2025-02-14 PROCEDURE — 83605 ASSAY OF LACTIC ACID: CPT | Performed by: EMERGENCY MEDICINE

## 2025-02-14 PROCEDURE — 80053 COMPREHEN METABOLIC PANEL: CPT | Performed by: EMERGENCY MEDICINE

## 2025-02-14 PROCEDURE — 80143 DRUG ASSAY ACETAMINOPHEN: CPT | Performed by: EMERGENCY MEDICINE

## 2025-02-14 PROCEDURE — 85610 PROTHROMBIN TIME: CPT | Performed by: EMERGENCY MEDICINE

## 2025-02-14 PROCEDURE — 99285 EMERGENCY DEPT VISIT HI MDM: CPT | Mod: 25 | Performed by: EMERGENCY MEDICINE

## 2025-02-14 PROCEDURE — 93005 ELECTROCARDIOGRAM TRACING: CPT

## 2025-02-14 PROCEDURE — 80178 ASSAY OF LITHIUM: CPT | Mod: CONLAB | Performed by: EMERGENCY MEDICINE

## 2025-02-14 PROCEDURE — 85025 COMPLETE CBC W/AUTO DIFF WBC: CPT | Performed by: EMERGENCY MEDICINE

## 2025-02-14 PROCEDURE — 36415 COLL VENOUS BLD VENIPUNCTURE: CPT | Performed by: EMERGENCY MEDICINE

## 2025-02-14 PROCEDURE — 2500000001 HC RX 250 WO HCPCS SELF ADMINISTERED DRUGS (ALT 637 FOR MEDICARE OP)

## 2025-02-14 RX ORDER — DIPHENHYDRAMINE HCL 25 MG
50 CAPSULE ORAL ONCE
Status: COMPLETED | OUTPATIENT
Start: 2025-02-14 | End: 2025-02-14

## 2025-02-14 RX ORDER — ZIPRASIDONE HYDROCHLORIDE 20 MG/1
20 CAPSULE ORAL ONCE
Status: COMPLETED | OUTPATIENT
Start: 2025-02-14 | End: 2025-02-14

## 2025-02-14 RX ORDER — DIPHENHYDRAMINE HCL 25 MG
CAPSULE ORAL
Status: COMPLETED
Start: 2025-02-14 | End: 2025-02-14

## 2025-02-14 RX ADMIN — ZIPRASIDONE HYDROCHLORIDE 20 MG: 20 CAPSULE ORAL at 17:18

## 2025-02-14 RX ADMIN — Medication 50 MG: at 17:02

## 2025-02-14 RX ADMIN — DIPHENHYDRAMINE HYDROCHLORIDE 50 MG: 25 CAPSULE ORAL at 17:02

## 2025-02-14 ASSESSMENT — PAIN SCALES - GENERAL: PAINLEVEL_OUTOF10: 0 - NO PAIN

## 2025-02-14 ASSESSMENT — PAIN - FUNCTIONAL ASSESSMENT: PAIN_FUNCTIONAL_ASSESSMENT: 0-10

## 2025-02-14 NOTE — ED PROVIDER NOTES
[facility]   ED  Provider Note  2/14/2025  4:48 PM  AC01/AC01      Chief Complaint   Patient presents with    Psychiatric Evaluation        History of Present Illness:   Alexander Zavala is a 39 y.o. male presenting to the ED for altered mental status, beginning unknown.  The complaint has been persistent, moderate in severity, and worsened by unknown.  Patient is mumbling and wringing his hands.  He cannot be understood or answer any questions.  His long history of schizoaffective disorder and methamphetamine abuse.  He was seen in the ER frequently for similar presentations.  He will be medically cleared for psychiatric evaluation.      Review of Systems:   Pertinent positives and review of systems as noted above.  Remaining 10 review of systems is negative or noncontributory to today's episode of care.  Review of Systems       --------------------------------------------- PAST HISTORY ---------------------------------------------  Past Medical History:   Past Medical History:   Diagnosis Date    Methamphetamine use disorder, moderate (Multi)     Schizoaffective disorder, bipolar type (Multi) 11/01/2023    R/o schizophrenia          Past Surgical History: History reviewed. No pertinent surgical history.     Social History:   Social History     Social History Narrative    Not on file        Family History: family history is not on file. Unless otherwise noted, family history is non contributory    Patient's Medications   New Prescriptions    No medications on file   Previous Medications    ARIPIPRAZOLE (ABILIFY) 10 MG TABLET        ARIPIPRAZOLE (ABILIFY) 15 MG TABLET    take 1 tablet by mouth twice a day with breakfast and dinner for MOOD / PSYCHOSIS    FLUOXETINE (PROZAC) 20 MG CAPSULE    Take 3 capsules (60 mg) by mouth once daily. Do not start before November 23, 2023.    FLUOXETINE (PROZAC) 40 MG CAPSULE        HALOPERIDOL (HALDOL) 5 MG TABLET    Take 1 tablet (5 mg) by mouth 2 times a day.    HYDROXYZINE PAMOATE  (VISTARIL) 50 MG CAPSULE    Take 1 capsule (50 mg) by mouth 3 times a day as needed for anxiety for up to 15 days.    LITHIUM ER (ESKALITH) 450 MG 12 HR TABLET    Take 1 tablet (450 mg) by mouth once daily at bedtime. Do not crush, chew, or split.    LITHIUM ER (LITHOBID) 300 MG 12 HR TABLET    Take 1 tablet (300 mg) by mouth once daily. Do not crush, chew, or split.  Take every morning Do not start before November 23, 2023.    NICOTINE POLACRILEX (NICORETTE) 4 MG GUM    Chew 1 each (4 mg) every 6 hours if needed for smoking cessation (nicotine craving, urge to smoke).    OLANZAPINE (ZYPREXA) 15 MG TABLET    Take 2 tablets (30 mg) by mouth once daily at bedtime.    PRAZOSIN (MINIPRESS) 1 MG CAPSULE    Take 1 capsule (1 mg) by mouth 2 times a day.    TRAZODONE (DESYREL) 100 MG TABLET    take 1 tablet by mouth daily at bedtime if needed for insomnia   Modified Medications    No medications on file   Discontinued Medications    No medications on file      The patient’s home medications have been reviewed.    Allergies: Amoxicillin    -------------------------------------------------- RESULTS -------------------------------------------------  All laboratory and radiology results have been personally reviewed by myself   LABS:  Labs Reviewed   PROTIME-INR - Abnormal       Result Value    Protime 13.7 (*)     INR 1.2 (*)    COMPREHENSIVE METABOLIC PANEL - Abnormal    Glucose 85      Sodium 135 (*)     Potassium 3.8      Chloride 105      Bicarbonate 22      Anion Gap 12      Urea Nitrogen 16      Creatinine 0.69      eGFR >90      Calcium 9.2      Albumin 4.0      Alkaline Phosphatase 70      Total Protein 6.8      AST 14      Bilirubin, Total 0.7      ALT 11     ALCOHOL - Normal    Alcohol <10     LACTATE - Normal    Lactate 0.9      Narrative:     Venipuncture immediately after or during the administration of Metamizole may lead to falsely low results. Testing should be performed immediately prior to Metamizole dosing.    CBC WITH AUTO DIFFERENTIAL    WBC 5.3      nRBC 0.0      RBC 5.24      Hemoglobin 15.9      Hematocrit 46.6      MCV 89      MCH 30.3      MCHC 34.1      RDW 12.5      Platelets 219      Neutrophils % 61.4      Immature Granulocytes %, Automated 0.2      Lymphocytes % 25.1      Monocytes % 6.2      Eosinophils % 6.2      Basophils % 0.9      Neutrophils Absolute 3.25      Immature Granulocytes Absolute, Automated 0.01      Lymphocytes Absolute 1.33      Monocytes Absolute 0.33      Eosinophils Absolute 0.33      Basophils Absolute 0.05     DRUG SCREEN,URINE   URINALYSIS WITH REFLEX MICROSCOPIC   LITHIUM     EKG: Sinus rhythm at 72 bpm, normal axis, normal intervals, no acute ST elevations.  Interpreted by NICO Harris MD    RADIOLOGY:  Interpreted by Radiologist.  No orders to display       Encounter Date: 02/02/25   Electrocardiogram, 12-lead   Result Value    Ventricular Rate 58    Atrial Rate 58    NE Interval 164    QRS Duration 92    QT Interval 452    QTC Calculation(Bazett) 443    P Axis 71    R Axis 76    T Axis 82    QRS Count 9    Q Onset 217    P Onset 135    P Offset 189    T Offset 443    QTC Fredericia 446    Narrative    Sinus bradycardia  Otherwise normal ECG  When compared with ECG of 23-DEC-2024 17:35,  Vent. rate has decreased BY  28 BPM  Nonspecific T wave abnormality no longer evident in Inferior leads     ------------------------- NURSING NOTES AND VITALS REVIEWED ---------------------------   The nursing notes within the ED encounter and vital signs as below have been reviewed.   /83   Pulse 89   Temp 36.6 °C (97.9 °F) (Tympanic)   Resp 18   Ht 1.829 m (6')   Wt 100 kg (220 lb 7.4 oz)   SpO2 97%   BMI 29.90 kg/m²   Oxygen Saturation Interpretation: Normal      ---------------------------------------------------PHYSICAL EXAM--------------------------------------  Physical Exam   Constitutional/General: Confused and withdrawn mumbling, unintelligible  Head: Normocephalic and  atraumatic  Eyes: PERRL, EOMI, conjunctiva normal, sclera non icteric  Mouth: Oropharynx clear, handling secretions, no trismus, no asymmetry of the posterior oropharynx or uvular edema  Neck: Supple, full ROM, non tender to palpation in the midline, no stridor, no crepitus, no meningeal signs  Respiratory: Lungs clear to auscultation bilaterally, no wheezes, rales, or rhonchi. Not in respiratory distress  Cardiovascular:  Regular rate. Regular rhythm. No murmurs, gallops, or rubs. 2+ distal pulses  Chest: No chest wall tenderness  GI:  Abdomen Soft, Non tender, Non distended.  +BS. No organomegaly, no palpable masses,  No rebound, guarding, or rigidity.   Musculoskeletal: Moves all extremities x 4. Warm and well perfused, no clubbing, cyanosis, or edema. Capillary refill <3 seconds  Integument: skin warm and dry. No rashes.   Lymphatic: no lymphadenopathy noted  Neurologic: No focal deficits, symmetric strength 5/5 in the upper and lower extremities bilaterally  Psychiatric: Withdrawn confused mumbling unable to answer direct questions.    Procedures    ------------------------------ ED COURSE/MEDICAL DECISION MAKING----------------------  Diagnoses as of 02/14/25 1900   Schizoaffective disorder, unspecified type      [facility]      Medical Decision Making:   ***  Diagnoses as of 02/14/25 1900   Schizoaffective disorder, unspecified type      Counseling:   The emergency provider has spoken with the {Persons; family members:04022} and discussed today’s results, in addition to providing specific details for the plan of care and counseling regarding the diagnosis and prognosis.  Questions are answered at this time and they are agreeable with the plan.      --------------------------------- IMPRESSION AND DISPOSITION ---------------------------------        IMPRESSION  No diagnosis found.    DISPOSITION  Disposition: {Plan; disposition:28097}  Patient condition is  {condition:60443}      Billing Provider Critical Care Time: *** minutes

## 2025-02-14 NOTE — ED TRIAGE NOTES
Pt arrived, talks more then typical. States wants to be in hospital again for psych issues when dr enters room

## 2025-02-15 VITALS
BODY MASS INDEX: 29.86 KG/M2 | WEIGHT: 220.46 LBS | HEIGHT: 72 IN | HEART RATE: 85 BPM | TEMPERATURE: 97.4 F | SYSTOLIC BLOOD PRESSURE: 118 MMHG | OXYGEN SATURATION: 100 % | RESPIRATION RATE: 17 BRPM | DIASTOLIC BLOOD PRESSURE: 74 MMHG

## 2025-02-15 LAB
AMPHETAMINES UR QL SCN: ABNORMAL
APPEARANCE UR: CLEAR
BARBITURATES UR QL SCN: ABNORMAL
BENZODIAZ UR QL SCN: ABNORMAL
BILIRUB UR STRIP.AUTO-MCNC: NEGATIVE MG/DL
BZE UR QL SCN: ABNORMAL
CANNABINOIDS UR QL SCN: ABNORMAL
COLOR UR: ABNORMAL
FENTANYL+NORFENTANYL UR QL SCN: ABNORMAL
GLUCOSE UR STRIP.AUTO-MCNC: NEGATIVE MG/DL
KETONES UR STRIP.AUTO-MCNC: ABNORMAL MG/DL
LEUKOCYTE ESTERASE UR QL STRIP.AUTO: NEGATIVE
LITHIUM SERPL-SCNC: <0.1 MMOL/L (ref 0.6–1.2)
METHADONE UR QL SCN: ABNORMAL
NITRITE UR QL STRIP.AUTO: NEGATIVE
OPIATES UR QL SCN: ABNORMAL
OXYCODONE+OXYMORPHONE UR QL SCN: ABNORMAL
PCP UR QL SCN: ABNORMAL
PH UR STRIP.AUTO: 6 [PH]
PROT UR STRIP.AUTO-MCNC: NEGATIVE MG/DL
RBC # UR STRIP.AUTO: NEGATIVE MG/DL
SP GR UR STRIP.AUTO: 1.03
UROBILINOGEN UR STRIP.AUTO-MCNC: 4 MG/DL

## 2025-02-15 PROCEDURE — 80307 DRUG TEST PRSMV CHEM ANLYZR: CPT | Performed by: EMERGENCY MEDICINE

## 2025-02-15 PROCEDURE — 81003 URINALYSIS AUTO W/O SCOPE: CPT | Mod: 59 | Performed by: EMERGENCY MEDICINE

## 2025-02-15 SDOH — HEALTH STABILITY: MENTAL HEALTH

## 2025-02-15 SDOH — HEALTH STABILITY: MENTAL HEALTH: BEHAVIORAL HEALTH(WDL): WITHIN DEFINED LIMITS

## 2025-02-15 SDOH — HEALTH STABILITY: MENTAL HEALTH
OTHER SUICIDE PRECAUTIONS INCLUDE: PATIENT PLACED IN GOWN (SNAPS OR PAPER GOWNS PREFERRED) AND WANDED;REMAINING RISKS IDENTIFIED AND MITIGATED;PATIENT PLACED IN PSYCH SAFE ROOM (IF AVAILABLE);ELOPEMENT RISK IDENTIFIED;FREQUENT ROUNDING WITH IRREGULAR CHECKS AT MINIMUM OF EVERY 15 MINUTES TO ASSESS PSYCH SAFETY PERFORMED;PERSONAL BELONGINGS SECURED

## 2025-02-15 ASSESSMENT — PAIN - FUNCTIONAL ASSESSMENT: PAIN_FUNCTIONAL_ASSESSMENT: 0-10

## 2025-02-15 ASSESSMENT — PAIN SCALES - GENERAL: PAINLEVEL_OUTOF10: 0 - NO PAIN

## 2025-02-15 ASSESSMENT — PAIN DESCRIPTION - PROGRESSION: CLINICAL_PROGRESSION: NOT CHANGED

## 2025-02-15 NOTE — SIGNIFICANT EVENT
Application for Emergency Admission      Ready for Transfer?  Is the patient medically cleared for transfer to inpatient psychiatry: Yes  Has the patient been accepted to an inpatient psychiatric hospital: Yes    Application for Emergency Admission  IN ACCORDANCE [place]  The Chief Clinical Officer of: [facility]AR[facility] Mcnally 2/15/2025 .10:27 AM    Reason for Hospitalization  The undersigned has reason to believe that: Alexander Zavala Is a mentally ill person subject to hospitalization by court order under division B Section 5122.01 of the Revised Code, i.e., this person:    1.yes represents a substantial risk of physical harm to self as manifested by evidence of threats of, or attempts at, suicide or serious self-inflicted bodily harm    2.no yes represents a substantial risk of physical harm to others as manifested by evidence of recent homicidal or other violent behavior, evidence of recent threats that place another in reasonable fear of violent behavior and serious physical harm, or other evidence of present dangerousness    3.yes represents a substantial and immediate risk of serious physical impairment or injury to self as manifested by  evidence that the person is unable to provide for and is not providing for the person's basic physical needs because of the person's mental illness and that appropriate provision for those needs cannot be made  immediately available in the community    4.yes would benefit from treatment in a hospital for his mental illness and is in need of such treatment as manifested by evidence of behavior that creates a grave and imminent risk to substantial rights of others or  himself.    5.yes would benefit from treatment as manifested by evidence of behavior that indicates all of the following:       (a) The person is unlikely to survive safely in the community without supervision, based on a clinical determination.       (b) The person has a history of lack  of compliance with treatment for mental illness and one of the following applies:      (i) At least twice within the thirty-six months prior to the filing of an affidavit seeking court-ordered treatment of the person under section 5122.111 of the Revised Code, the lack of compliance has been a significant factor in necessitating hospitalization in a hospital or receipt of services in a forensic or other mental health unit of a correctional facility, provided that the thirty-six-month period shall be extended by the length of any hospitalization or incarceration of the person that occurred within the thirty-six-month period.      (ii) Within the forty-eight months prior to the filing of an affidavit seeking court-ordered treatment of the person under section 5122.111 of the Revised Code, the lack of compliance resulted in one or more acts of serious violent behavior toward self or others or threats of, or attempts at, serious physical harm to self or others, provided that the forty-eight-month period shall be extended by the length of any hospitalization or incarceration of the person that occurred within the forty-eight-month period.      (c) The person, as a result of mental illness, is unlikely to voluntarily participate in necessary treatment.       (d) In view of the person's treatment history and current behavior, the person is in need of treatment in order to prevent a relapse or deterioration that would be likely to result in substantial risk of serious harm to the person or others.    (e) Represents a substantial risk of physical harm to self or others if allowed to remain at liberty pending examination.    Therefore, it is requested that said person be admitted to the above named facility.    STATEMENT OF BELIEF    This patient seen evaluated and treated by Dr. Pickett, he is medically cleared the patient I reviewed patient's chart based on patient presenting symptom and physical examination finding and  assessment Dr. Arenas patient needs to be admitted to inpatient psychiatric facility for further care and treatment.    Yonny Jones MD 2/15/2025     ___________ ER attending physician _________________________________________________   Place of Employment: [facility]    STATEMENT OF OBSERVATION BY PSYCHIATRIST, LICENSED PHYSICIAN, OR LICENSED CLINICAL PSYCHOLOGIST, IF APPLICABLE    Place of Observation (e.g., [facility] mental [facility], general hospital, office, emergency facility)  (If applicable, please complete)    Yonny Jones MD 2/15/2025    _____________________________________________________________

## 2025-02-15 NOTE — SIGNIFICANT EVENT
Application for Emergency Admission    {Provider Tip- Please make sure to refresh the note to ensure the most updated flow of data :99}  Ready for Transfer?  Is the patient medically cleared for transfer to inpatient psychiatry: Yes  Has the patient been accepted to an inpatient psychiatric hospital: Yes    Application for Emergency Admission  IN ACCORDANCE WITH [place]122[place]  The Chief Clinical Officer of: *** 2/15/2025 .5:37 AM    Reason for Hospitalization  The undersigned has reason to believe that: Alexander Zavala Is a mentally ill person subject to hospitalization by court order under division B Section 5122.01 of [place] Revised Code, i.e., this person:    1.Yes  Represents a substantial risk of physical harm to self as manifested by evidence of threats of, or attempts at, suicide or serious self-inflicted bodily harm    2.Yes Represents a substantial risk of physical harm to others as manifested by evidence of recent homicidal or other violent behavior, evidence of recent threats that place another in reasonable fear of violent behavior and serious physical harm, or other evidence of present dangerousness    3.Yes Represents a substantial and immediate risk of serious physical impairment or injury to self as manifested by  evidence that the person is unable to provide for and is not providing for the person's basic physical needs because of the person's mental illness and that appropriate provision for those needs cannot be made  immediately available in the community    4.Yes Would benefit from treatment in a hospital for his mental illness and is in need of such treatment as manifested by evidence of behavior that creates a grave and imminent risk to substantial rights of others or  himself.    5.Yes Would benefit from treatment as manifested by evidence of behavior that indicates all of the following:       (a) The person is unlikely to survive safely in the community without supervision, based on a  clinical determination.       (b) The person has a history of lack of compliance with treatment for mental illness and one of the following applies:      (i) At least twice within the thirty-six months prior to the filing of an affidavit seeking court-ordered treatment of the person under section 5122.111 of the Revised Code, the lack of compliance has been a significant factor in necessitating hospitalization in a hospital or receipt of services in a forensic or other mental health unit of a correctional facility, provided that the thirty-six-month period shall be extended by the length of any hospitalization or incarceration of the person that occurred within the thirty-six-month period.      (ii) Within the forty-eight months prior to the filing of an affidavit seeking court-ordered treatment of the person under section 5122.111 of the Revised Code, the lack of compliance resulted in one or more acts of serious violent behavior toward self or others or threats of, or attempts at, serious physical harm to self or others, provided that the forty-eight-month period shall be extended by the length of any hospitalization or incarceration of the person that occurred within the forty-eight-month period.      (c) The person, as a result of mental illness, is unlikely to voluntarily participate in necessary treatment.       (d) In view of the person's treatment history and current behavior, the person is in need of treatment in order to prevent a relapse or deterioration that would be likely to result in substantial risk of serious harm to the person or others.    (e) Represents a substantial risk of physical harm to self or others if allowed to remain at liberty pending examination.    Therefore, it is requested that said person be admitted to the above named facility.    STATEMENT OF BELIEF    Must be filled out by one of the following: a psychiatrist, licensed physician, licensed clinical psychologist, health or police  officer,  or .  (Statement shall include the circumstances under which the individual was taken into custody and the reason for the person's belief that hospitalization is necessary. The statement shall also include a reference to efforts made to secure the individual's property at his residence if he was taken into custody there. Every reasonable and appropriate effort should be made to take this person into custody in the least conspicuous manner possible.)         You Pickett MD 2/15/2025     _____________________________________________________________   Place of Employment: [facility]    STATEMENT OF OBSERVATION BY PSYCHIATRIST, LICENSED PHYSICIAN, OR LICENSED CLINICAL PSYCHOLOGIST, IF APPLICABLE    Place of Observation (e.g., [facility], general hospital, office, emergency facility)  (If applicable, please complete)    You Pickett MD 2/15/2025    _____________________________________________________________
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
## 2025-02-15 NOTE — PROGRESS NOTES
This patient has been in the emergency room for more than 17 hours.  He has acute psychosis from decompensation of schizoaffective disorder.  He has been medically clear for my colleagues.  Patient has been cooperative in the ER required no intervention during my observation of this patient.  Patient has been accepted to [facility] by psychiatrist MD Batsheva.  Is being transferred via is waiting for transportation.   Patient required pink slipped feeling and EMTALA note completed and also needed to EMTALA note on this patient otherwise he is required no other intervention.  Ready to be transferred to psychiatric facility     Yonny Jones MD

## 2025-02-18 LAB
ATRIAL RATE: 72 BPM
P AXIS: 67 DEGREES
P OFFSET: 195 MS
P ONSET: 137 MS
PR INTERVAL: 164 MS
Q ONSET: 219 MS
QRS COUNT: 12 BEATS
QRS DURATION: 94 MS
QT INTERVAL: 404 MS
QTC CALCULATION(BAZETT): 442 MS
QTC FREDERICIA: 429 MS
R AXIS: 74 DEGREES
T AXIS: 77 DEGREES
T OFFSET: 421 MS
VENTRICULAR RATE: 72 BPM

## 2025-02-19 ENCOUNTER — HOSPITAL ENCOUNTER (EMERGENCY)
Facility: HOSPITAL | Age: 40
Discharge: HOME | End: 2025-02-19
Attending: STUDENT IN AN ORGANIZED HEALTH CARE EDUCATION/TRAINING PROGRAM
Payer: MEDICARE

## 2025-02-19 ENCOUNTER — APPOINTMENT (OUTPATIENT)
Dept: RADIOLOGY | Facility: HOSPITAL | Age: 40
End: 2025-02-19
Payer: MEDICARE

## 2025-02-19 VITALS
RESPIRATION RATE: 16 BRPM | DIASTOLIC BLOOD PRESSURE: 89 MMHG | HEART RATE: 94 BPM | TEMPERATURE: 97.9 F | BODY MASS INDEX: 29.8 KG/M2 | OXYGEN SATURATION: 97 % | SYSTOLIC BLOOD PRESSURE: 115 MMHG | HEIGHT: 72 IN | WEIGHT: 220 LBS
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
## 2025-02-19 DIAGNOSIS — Z71.1 PHYSICALLY WELL BUT WORRIED: Primary | ICD-10-CM

## 2025-02-19 DIAGNOSIS — S02.5XXA CLOSED FRACTURE OF TOOTH, INITIAL ENCOUNTER: ICD-10-CM

## 2025-02-19 PROCEDURE — 70450 CT HEAD/BRAIN W/O DYE: CPT

## 2025-02-19 PROCEDURE — 99284 EMERGENCY DEPT VISIT MOD MDM: CPT | Mod: 25 | Performed by: STUDENT IN AN ORGANIZED HEALTH CARE EDUCATION/TRAINING PROGRAM

## 2025-02-19 PROCEDURE — 76377 3D RENDER W/INTRP POSTPROCES: CPT

## 2025-02-19 PROCEDURE — 70486 CT MAXILLOFACIAL W/O DYE: CPT

## 2025-02-19 ASSESSMENT — LIFESTYLE VARIABLES
HAVE PEOPLE ANNOYED YOU BY CRITICIZING YOUR DRINKING: NO
HAVE YOU EVER FELT YOU SHOULD CUT DOWN ON YOUR DRINKING: NO
TOTAL SCORE: 0
EVER HAD A DRINK FIRST THING IN THE MORNING TO STEADY YOUR NERVES TO GET RID OF A HANGOVER: NO
EVER FELT BAD OR GUILTY ABOUT YOUR DRINKING: NO

## 2025-02-19 ASSESSMENT — PAIN DESCRIPTION - LOCATION: LOCATION: HEAD

## 2025-02-19 ASSESSMENT — PAIN - FUNCTIONAL ASSESSMENT: PAIN_FUNCTIONAL_ASSESSMENT: 0-10

## 2025-02-19 ASSESSMENT — PAIN SCALES - GENERAL: PAINLEVEL_OUTOF10: 1

## 2025-02-19 ASSESSMENT — PAIN DESCRIPTION - PAIN TYPE: TYPE: ACUTE PAIN
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
## 2025-04-08 ENCOUNTER — APPOINTMENT (OUTPATIENT)
Dept: PRIMARY CARE | Facility: CLINIC | Age: 40
End: 2025-04-08
Payer: MEDICARE

## 2025-04-12 ENCOUNTER — HOSPITAL ENCOUNTER (EMERGENCY)
Facility: HOSPITAL | Age: 40
Discharge: HOME | End: 2025-04-12
Attending: EMERGENCY MEDICINE
Payer: MEDICARE

## 2025-04-12 VITALS
HEART RATE: 97 BPM | SYSTOLIC BLOOD PRESSURE: 132 MMHG | OXYGEN SATURATION: 99 % | TEMPERATURE: 97.6 F | HEIGHT: 72 IN | RESPIRATION RATE: 18 BRPM | BODY MASS INDEX: 27.09 KG/M2 | WEIGHT: 200 LBS | DIASTOLIC BLOOD PRESSURE: 80 MMHG

## 2025-04-12 DIAGNOSIS — S01.81XA FACIAL LACERATION, INITIAL ENCOUNTER: Primary | ICD-10-CM

## 2025-04-12 PROCEDURE — 99283 EMERGENCY DEPT VISIT LOW MDM: CPT | Performed by: EMERGENCY MEDICINE

## 2025-04-12 PROCEDURE — 12011 RPR F/E/E/N/L/M 2.5 CM/<: CPT | Performed by: EMERGENCY MEDICINE

## 2025-04-12 RX ORDER — LIDOCAINE HYDROCHLORIDE 20 MG/ML
INJECTION, SOLUTION INFILTRATION; PERINEURAL
Status: COMPLETED
Start: 2025-04-12 | End: 2025-04-12

## 2025-04-12 ASSESSMENT — PAIN - FUNCTIONAL ASSESSMENT
PAIN_FUNCTIONAL_ASSESSMENT: 0-10
PAIN_FUNCTIONAL_ASSESSMENT: 0-10

## 2025-04-12 ASSESSMENT — PAIN SCALES - GENERAL
PAINLEVEL_OUTOF10: 1
PAINLEVEL_OUTOF10: 0 - NO PAIN

## 2025-04-12 ASSESSMENT — PAIN DESCRIPTION - ORIENTATION: ORIENTATION: LEFT

## 2025-04-13 NOTE — ED PROVIDER NOTES
HPI   Chief Complaint   Patient presents with    Laceration     Left eyelid. Denioes other comp;laivalentina       Patient tripped over a railroad tie and struck the left supraorbital area of the left eye on the next [place].  No loss of consciousness.  No headache or blurred vision.            Patient History   Past Medical History:   Diagnosis Date    Methamphetamine use disorder, moderate     Schizoaffective disorder, bipolar type (Multi) 11/01/2023    R/o schizophrenia       History reviewed. No pertinent surgical history.  No family history on file.  Social History     Tobacco Use    Smoking status: Every Day     Types: Cigarettes    Smokeless tobacco: Never   Vaping Use    Vaping status: Some Days   Substance Use Topics    Alcohol use: Not Currently    Drug use: Yes     Types: Methamphetamines, Marijuana       Physical Exam   ED Triage Vitals [04/12/25 2319]   Temperature Heart Rate Respirations BP   36.4 °C (97.6 °F) 97 18 132/80      SpO2 Temp Source Heart Rate Source Patient Position   99 % Tympanic -- --      BP Location FiO2 (%)     -- --       Physical Exam  Vitals and nursing note reviewed.   Constitutional:       General: He is not in acute distress.     Appearance: He is well-developed.   HENT:      Head: Normocephalic and atraumatic.   Eyes:      Conjunctiva/sclera: Conjunctivae normal.   Cardiovascular:      Rate and Rhythm: Normal rate and regular rhythm.      Heart sounds: No murmur heard.  Pulmonary:      Effort: Pulmonary effort is normal. No respiratory distress.      Breath sounds: Normal breath sounds.   Abdominal:      Palpations: Abdomen is soft.      Tenderness: There is no abdominal tenderness.   Musculoskeletal:         General: No swelling.      Cervical back: Neck supple.   Skin:     General: Skin is warm and dry.      Capillary Refill: Capillary refill takes less than 2 seconds.      Comments: 1.2 cm laceration above left eye   Neurological:      Mental Status: He is alert.   Psychiatric:          Mood and Affect: Mood normal.           ED Course & MDM   Diagnoses as of 04/12/25 2338   Facial laceration, initial encounter                 No data recorded     [place] Coma Scale Score: 15 (04/12/25 2326 : Steve Peres RN)                           Medical Decision Making  No alteration in level of consciousness.  Patient's gait is normal.  Vision he reports no abnormalities.  We were able to place 3 Vicryl sutures, 5 oh, without any difficulty.  Bandage was placed afterwards.        Procedure  Laceration Repair    Performed by: You Pickett MD  Authorized by: You Pickett MD    Consent:     Consent obtained:  Verbal    Risks discussed:  Pain  Universal protocol:     Procedure explained and questions answered to patient or proxy's satisfaction: yes      Relevant documents present and verified: no      Test results available: no      Imaging studies available: no      Required blood products, implants, devices, and special equipment available: no      Site/side marked: yes      Immediately prior to procedure, a time out was called: yes      Patient identity confirmed:  Verbally with patient  Anesthesia:     Anesthesia method:  Local infiltration    Local anesthetic:  Lidocaine 2% w/o epi  Laceration details:     Location:  Face    Length (cm):  1.2    Depth (mm):  2  Pre-procedure details:     Preparation:  Patient was prepped and draped in usual sterile fashion  Exploration:     Hemostasis achieved with:  Direct pressure    Imaging outcome: foreign body not noted      Wound exploration: wound explored through full range of motion      Contaminated: no    Treatment:     Area cleansed with:  Tony    Amount of cleaning:  Standard    Irrigation solution:  Sterile water    Irrigation volume:  3 cc    Irrigation method:  Syringe    Visualized foreign bodies/material removed: no      Debridement:  None  Skin repair:     Repair method:  Sutures    Suture size:  5-0    Suture material:  Nylon     Suture technique:  Simple interrupted    Number of sutures:  3  Approximation:     Approximation:  Close  Repair type:     Repair type:  Simple  Post-procedure details:     Dressing:  Sterile dressing    Procedure completion:  Tolerated       You Pickett MD  04/12/25 0236

## 2025-04-25 NOTE — PROGRESS NOTES
"Subjective   Patient ID: Alexander Zavala is a 39 y.o. male who presents for No chief complaint on file..    HPI    Lumbar back pain: Low and left and right sided pain, Its usually there and on the left side. He had xrays   at [facility] before and they told him there was nothing there. Certain times it did hurt, [place] he was working when he older. He is not taking anything for pain. He is working on getting his insurance figured out. Low back lumbar spine. He would like to see a back specialist, he feels like it is out of place. He is on Zoloft and states he is gaining weight. This making him worse. He has seen a chiropractor in the past in 2013, was not able to help him anymore. Doing stretching at home      States he feels like he is XYZ. Feels that he is woman on top and man on the bottom. States he has more \"yin than his yang. \"He likes to paint nails. Appears like gynecomastia         He has been in the ED multiple times r/t psych issues. He is a poor historian. On review of his chart it appears he has been diagnosed with bipolar and schizophrenia in the past. He is concerned about need for ADHD medications. He was started on mirtazapine and trazodone inpatient after a suicide attempt, psychotic episode. He is unable to say if he has seen a psychiatrist recently or who he would like to see. While in the office, he states that he is unsure if he even needs medications, and he complains that a lot of medications have bad side effects, like suicidal urges and weight gain.  States that he \"Has his own self worth.\"   Self employed, he has medicaid.      Going to community counseling and [facility] [employer]. In the process of switching. Needs help with housing.      He denies active chest pain, n/v/d, constipation, urinary symptoms, fever/chills.     All other systems have been reviewed and are negative for complaint     SocialHx: smokes 1ppd sometimes less or more, " occasional alcohol use, does not use recreational drugs, one probation for his suicide attempt, unemployed right now  MedHx: HLD, insomnia, schizophrenia  Medications: atorvastatin, mirtazapine, trazodone  SurgHx: None  FHx: brother with schizophrenia, father  of dementia, mother is healthy  Allergies: NKDA     Review of systems completed and unremarkable other than what is documented in HPI.    Objective   There were no vitals taken for this visit.    No data recorded    Physical Exam    Gen: No acute distress, alert and oriented x3, pleasant   HEENT: moist mucous membranes, b/l external auditory canals are clear of debris, TMs within normal limits, no oropharyngeal lesions, eomi, perrla   Neck: thyroid within normal limits, no lymphadenopathy   CV: RRR, normal S1/S2, no murmur   Resp: Clear to auscultation bilaterally, no wheezes or rhonchi appreciated  Abd: soft, nontender, non-distended, no guarding/rigidity, bowel sounds present  Extr: no edema, no calf tenderness  Derm: Skin is warm and dry, no rashes appreciated  Psych: mood is good, affect is congruent, good hygiene, normal speech and eye contact  Neuro: cranial nerves grossly intact, normal gait      Assessment/Plan   {Assess/PlanSmartLinks:56659}    #Back Pain   Xray Lumbar               He states that he was told he had a slipped disc   He wants to see a back specialist for this   Referral placed today   Call with any issues   Continue supportive care      #h/o hyperlipidemia:  unclear if he is taking the statin  we will check his lipid panel  other recent bloodwork within normal limits     #Insomnia:  continue trazodone     #Bipolar disorder  #Schizophrenia  #possible ADHD:  Following with [facility]    importance of counselling and continuing his medications

## 2025-04-30 ENCOUNTER — APPOINTMENT (OUTPATIENT)
Dept: PRIMARY CARE | Facility: CLINIC | Age: 40
End: 2025-04-30
Payer: MEDICARE

## 2025-04-30 VITALS
HEIGHT: 72 IN | SYSTOLIC BLOOD PRESSURE: 118 MMHG | HEART RATE: 81 BPM | BODY MASS INDEX: 29.8 KG/M2 | DIASTOLIC BLOOD PRESSURE: 79 MMHG | WEIGHT: 220 LBS

## 2025-04-30 DIAGNOSIS — F41.9 ANXIETY: ICD-10-CM

## 2025-04-30 DIAGNOSIS — F25.0 SCHIZOAFFECTIVE DISORDER, BIPOLAR TYPE (MULTI): ICD-10-CM

## 2025-04-30 DIAGNOSIS — Z12.5 PROSTATE CANCER SCREENING: ICD-10-CM

## 2025-04-30 DIAGNOSIS — F51.01 PRIMARY INSOMNIA: Primary | ICD-10-CM

## 2025-04-30 DIAGNOSIS — F19.10 DRUG ABUSE: ICD-10-CM

## 2025-04-30 PROCEDURE — 99214 OFFICE O/P EST MOD 30 MIN: CPT | Performed by: REGISTERED NURSE

## 2025-04-30 PROCEDURE — 3008F BODY MASS INDEX DOCD: CPT | Performed by: REGISTERED NURSE

## 2025-04-30 RX ORDER — FLUOXETINE HYDROCHLORIDE 40 MG/1
40 CAPSULE ORAL DAILY
Qty: 30 CAPSULE | Refills: 3 | Status: SHIPPED | OUTPATIENT
Start: 2025-04-30

## 2025-04-30 RX ORDER — TRAZODONE HYDROCHLORIDE 100 MG/1
100 TABLET ORAL NIGHTLY
Qty: 30 TABLET | Refills: 2 | Status: SHIPPED | OUTPATIENT
Start: 2025-04-30 | End: 2025-07-29

## 2025-04-30 RX ORDER — HYDROXYZINE PAMOATE 50 MG/1
50 CAPSULE ORAL 3 TIMES DAILY PRN
Qty: 45 CAPSULE | Refills: 0 | Status: SHIPPED | OUTPATIENT
Start: 2025-04-30 | End: 2025-05-15

## 2025-04-30 RX ORDER — ARIPIPRAZOLE 10 MG/1
10 TABLET ORAL DAILY
Qty: 30 TABLET | Refills: 3 | Status: SHIPPED | OUTPATIENT
Start: 2025-04-30

## 2025-04-30 NOTE — PATIENT INSTRUCTIONS
Spine:  Dr. Benson ([facility]) [phone]  Dr. Gilmore ([facility]) [phone]  Juan Carlos Sinclair (-[facility]) [phone]  Jim Mir (- Neuro[facility]sp[facility]) [phone]  Joseph Toribio ([facility]) [phone]  [facility] [phone]

## 2025-05-05 ENCOUNTER — APPOINTMENT (OUTPATIENT)
Dept: PRIMARY CARE | Facility: CLINIC | Age: 40
End: 2025-05-05
Payer: MEDICARE

## 2025-06-13 DIAGNOSIS — F51.01 PRIMARY INSOMNIA: ICD-10-CM

## 2025-06-13 DIAGNOSIS — F25.0 SCHIZOAFFECTIVE DISORDER, BIPOLAR TYPE (MULTI): ICD-10-CM

## 2025-06-13 RX ORDER — TRAZODONE HYDROCHLORIDE 100 MG/1
100 TABLET ORAL NIGHTLY PRN
Qty: 90 TABLET | Refills: 1 | Status: SHIPPED | OUTPATIENT
Start: 2025-06-13 | End: 2025-12-10

## 2025-06-13 RX ORDER — HYDROXYZINE HYDROCHLORIDE 50 MG/1
50 TABLET, FILM COATED ORAL 3 TIMES DAILY
Qty: 270 TABLET | Refills: 1 | Status: SHIPPED | OUTPATIENT
Start: 2025-06-13

## 2025-06-13 RX ORDER — ARIPIPRAZOLE 10 MG/1
10 TABLET ORAL DAILY
Qty: 30 TABLET | Refills: 3 | Status: SHIPPED | OUTPATIENT
Start: 2025-06-13

## 2025-06-13 RX ORDER — HYDROXYZINE HYDROCHLORIDE 50 MG/1
25 TABLET, FILM COATED ORAL 3 TIMES DAILY
COMMUNITY
Start: 2025-03-14 | End: 2025-06-13 | Stop reason: SDUPTHER

## 2025-06-13 NOTE — PROGRESS NOTES
Alexander is in [place] [facility] right now. His  is calling to ask about his medications and asking for a PRN. He has trazodone and hydroxyzine, but is not permitted to have trazodone if he goes to [facility] but can in [place]. He will be allowed to have hydroxyzine. They are going to keep him in [place] as long as they can, and per Alexander trazodone works best for him. He was previously on abilify 10mg but hasn't been taking his medications for a couple months. They are asking what he can/should have, and if we can send refills of what he can be on to [facility].       Karla Eduardo  [phone]

## 2025-08-13 DIAGNOSIS — F41.9 ANXIETY: ICD-10-CM

## 2025-08-13 DIAGNOSIS — F25.0 SCHIZOAFFECTIVE DISORDER, BIPOLAR TYPE (MULTI): ICD-10-CM

## 2025-08-13 RX ORDER — ARIPIPRAZOLE 10 MG/1
10 TABLET ORAL DAILY
Qty: 30 TABLET | Refills: 3 | Status: SHIPPED | OUTPATIENT
Start: 2025-08-13

## 2025-08-13 RX ORDER — FLUOXETINE HYDROCHLORIDE 40 MG/1
40 CAPSULE ORAL DAILY
Qty: 30 CAPSULE | Refills: 3 | Status: SHIPPED | OUTPATIENT
Start: 2025-08-13

## 2025-10-27 ENCOUNTER — APPOINTMENT (OUTPATIENT)
Dept: PRIMARY CARE | Facility: CLINIC | Age: 40
End: 2025-10-27
Payer: MEDICARE